# Patient Record
Sex: MALE | Race: WHITE | NOT HISPANIC OR LATINO | Employment: OTHER | URBAN - METROPOLITAN AREA
[De-identification: names, ages, dates, MRNs, and addresses within clinical notes are randomized per-mention and may not be internally consistent; named-entity substitution may affect disease eponyms.]

---

## 2017-04-17 ENCOUNTER — LAB (OUTPATIENT)
Dept: LAB | Facility: HOSPITAL | Age: 67
End: 2017-04-17
Attending: INTERNAL MEDICINE

## 2017-04-17 ENCOUNTER — TRANSCRIBE ORDERS (OUTPATIENT)
Dept: ADMINISTRATIVE | Facility: HOSPITAL | Age: 67
End: 2017-04-17

## 2017-04-17 DIAGNOSIS — R73.03 PREDIABETES: ICD-10-CM

## 2017-04-17 DIAGNOSIS — E29.1 HYPOGONADISM IN MALE: ICD-10-CM

## 2017-04-17 DIAGNOSIS — E55.9 VITAMIN D DEFICIENCY: ICD-10-CM

## 2017-04-17 DIAGNOSIS — E78.5 DYSLIPIDEMIA: ICD-10-CM

## 2017-04-17 DIAGNOSIS — R53.83 TIRED: ICD-10-CM

## 2017-04-17 DIAGNOSIS — R73.03 PREDIABETES: Primary | ICD-10-CM

## 2017-04-17 LAB
25(OH)D3 SERPL-MCNC: >60 NG/ML
ALBUMIN SERPL-MCNC: 4 G/DL (ref 3.5–5.2)
ALBUMIN/GLOB SERPL: 1.4 G/DL
ALP SERPL-CCNC: 44 U/L (ref 40–129)
ALT SERPL W P-5'-P-CCNC: 18 U/L (ref 5–41)
ANION GAP SERPL CALCULATED.3IONS-SCNC: 13.5 MMOL/L
AST SERPL-CCNC: 21 U/L (ref 5–40)
BASOPHILS # BLD AUTO: 0.03 10*3/MM3 (ref 0–0.2)
BASOPHILS NFR BLD AUTO: 0.4 % (ref 0–2)
BILIRUB SERPL-MCNC: 0.4 MG/DL (ref 0.2–1.2)
BUN BLD-MCNC: 15 MG/DL (ref 8–23)
BUN/CREAT SERPL: 12.1 (ref 7–25)
CALCIUM SPEC-SCNC: 8.7 MG/DL (ref 8.8–10.5)
CHLORIDE SERPL-SCNC: 103 MMOL/L (ref 98–107)
CHOLEST SERPL-MCNC: 143 MG/DL (ref 0–200)
CO2 SERPL-SCNC: 22.5 MMOL/L (ref 22–29)
CORTIS SERPL-MCNC: 15.06 MCG/DL
CREAT BLD-MCNC: 1.24 MG/DL (ref 0.76–1.27)
DEPRECATED RDW RBC AUTO: 42.5 FL (ref 37–54)
EOSINOPHIL # BLD AUTO: 0.14 10*3/MM3 (ref 0.1–0.3)
EOSINOPHIL NFR BLD AUTO: 2 % (ref 0–4)
ERYTHROCYTE [DISTWIDTH] IN BLOOD BY AUTOMATED COUNT: 12.7 % (ref 11.5–14.5)
FERRITIN SERPL-MCNC: 138 NG/ML (ref 30–400)
FOLATE SERPL-MCNC: 13.7 NG/ML (ref 4.78–24.2)
FSH SERPL-ACNC: 12.35 MIU/ML
GFR SERPL CREATININE-BSD FRML MDRD: 58 ML/MIN/1.73
GLOBULIN UR ELPH-MCNC: 2.8 GM/DL
GLUCOSE BLD-MCNC: 99 MG/DL (ref 65–99)
HBA1C MFR BLD: 5.9 % (ref 4.8–5.6)
HCT VFR BLD AUTO: 45.1 % (ref 42–52)
HDLC SERPL-MCNC: 59 MG/DL (ref 40–60)
HGB BLD-MCNC: 15.1 G/DL (ref 14–18)
IMM GRANULOCYTES # BLD: 0.02 10*3/MM3 (ref 0–0.03)
IMM GRANULOCYTES NFR BLD: 0.3 % (ref 0–0.5)
IRON 24H UR-MRATE: 94 MCG/DL (ref 59–158)
IRON SATN MFR SERPL: 40 %
LDLC SERPL CALC-MCNC: 73 MG/DL (ref 0–100)
LDLC/HDLC SERPL: 1.24 {RATIO}
LH SERPL-ACNC: 10.7 MIU/ML
LYMPHOCYTES # BLD AUTO: 1.71 10*3/MM3 (ref 0.6–4.8)
LYMPHOCYTES NFR BLD AUTO: 24.3 % (ref 20–45)
MCH RBC QN AUTO: 30.3 PG (ref 27–31)
MCHC RBC AUTO-ENTMCNC: 33.5 G/DL (ref 31–37)
MCV RBC AUTO: 90.6 FL (ref 80–94)
MONOCYTES # BLD AUTO: 0.64 10*3/MM3 (ref 0–1)
MONOCYTES NFR BLD AUTO: 9.1 % (ref 3–8)
NEUTROPHILS # BLD AUTO: 4.51 10*3/MM3 (ref 1.5–8.3)
NEUTROPHILS NFR BLD AUTO: 63.9 % (ref 45–70)
NRBC BLD MANUAL-RTO: 0 /100 WBC (ref 0–0)
PLATELET # BLD AUTO: 219 10*3/MM3 (ref 140–500)
PMV BLD AUTO: 9.7 FL (ref 7.4–10.4)
POTASSIUM BLD-SCNC: 4 MMOL/L (ref 3.5–5.2)
PROLACTIN SERPL-MCNC: 17.51 NG/ML (ref 4.04–15.2)
PROT SERPL-MCNC: 6.8 G/DL (ref 6–8.5)
RBC # BLD AUTO: 4.98 10*6/MM3 (ref 4.7–6.1)
RETICS/RBC NFR AUTO: 1.33 % (ref 0.5–1.5)
SODIUM BLD-SCNC: 139 MMOL/L (ref 136–145)
TIBC SERPL-MCNC: 237 MCG/DL (ref 261–498)
TRIGL SERPL-MCNC: 55 MG/DL (ref 0–150)
TSH SERPL DL<=0.05 MIU/L-ACNC: 0.99 MIU/ML (ref 0.27–4.2)
UIBC SERPL-MCNC: 143 MCG/DL (ref 112–346)
VIT B12 BLD-MCNC: 1459 PG/ML (ref 211–946)
VLDLC SERPL-MCNC: 11 MG/DL (ref 8–32)
WBC NRBC COR # BLD: 7.05 10*3/MM3 (ref 4.8–10.8)

## 2017-04-17 PROCEDURE — 84146 ASSAY OF PROLACTIN: CPT

## 2017-04-17 PROCEDURE — 84402 ASSAY OF FREE TESTOSTERONE: CPT

## 2017-04-17 PROCEDURE — 83540 ASSAY OF IRON: CPT

## 2017-04-17 PROCEDURE — 82607 VITAMIN B-12: CPT

## 2017-04-17 PROCEDURE — 80053 COMPREHEN METABOLIC PANEL: CPT

## 2017-04-17 PROCEDURE — 85025 COMPLETE CBC W/AUTO DIFF WBC: CPT

## 2017-04-17 PROCEDURE — 83001 ASSAY OF GONADOTROPIN (FSH): CPT

## 2017-04-17 PROCEDURE — 82746 ASSAY OF FOLIC ACID SERUM: CPT

## 2017-04-17 PROCEDURE — 84270 ASSAY OF SEX HORMONE GLOBUL: CPT

## 2017-04-17 PROCEDURE — 82670 ASSAY OF TOTAL ESTRADIOL: CPT

## 2017-04-17 PROCEDURE — 82728 ASSAY OF FERRITIN: CPT

## 2017-04-17 PROCEDURE — 85045 AUTOMATED RETICULOCYTE COUNT: CPT

## 2017-04-17 PROCEDURE — 83550 IRON BINDING TEST: CPT

## 2017-04-17 PROCEDURE — 82533 TOTAL CORTISOL: CPT

## 2017-04-17 PROCEDURE — 80061 LIPID PANEL: CPT

## 2017-04-17 PROCEDURE — 84403 ASSAY OF TOTAL TESTOSTERONE: CPT

## 2017-04-17 PROCEDURE — 82157 ASSAY OF ANDROSTENEDIONE: CPT

## 2017-04-17 PROCEDURE — 82306 VITAMIN D 25 HYDROXY: CPT

## 2017-04-17 PROCEDURE — 83002 ASSAY OF GONADOTROPIN (LH): CPT

## 2017-04-17 PROCEDURE — 84144 ASSAY OF PROGESTERONE: CPT

## 2017-04-17 PROCEDURE — 83525 ASSAY OF INSULIN: CPT

## 2017-04-17 PROCEDURE — 36415 COLL VENOUS BLD VENIPUNCTURE: CPT

## 2017-04-17 PROCEDURE — 82679 ASSAY OF ESTRONE: CPT

## 2017-04-17 PROCEDURE — 84443 ASSAY THYROID STIM HORMONE: CPT

## 2017-04-17 PROCEDURE — G0480 DRUG TEST DEF 1-7 CLASSES: HCPCS

## 2017-04-17 PROCEDURE — 82626 DEHYDROEPIANDROSTERONE: CPT

## 2017-04-17 PROCEDURE — 83036 HEMOGLOBIN GLYCOSYLATED A1C: CPT

## 2017-04-18 LAB
CONV COMMENT: NORMAL
ESTRADIOL SERPL HS-MCNC: 61.3 PG/ML (ref 7.6–42.6)
INSULIN SERPL-ACNC: 10.5 UIU/ML (ref 2.6–24.9)
PROGEST SERPL-MCNC: 0.1 NG/ML (ref 0–0.5)
SHBG SERPL-SCNC: 59.1 NMOL/L (ref 19.3–76.4)
TESTOST FREE SERPL-MCNC: 11.2 PG/ML (ref 6.6–18.1)
TESTOST SERPL-MCNC: 840 NG/DL (ref 348–1197)

## 2017-04-19 LAB
DHEA SERPL-MCNC: 304 NG/DL (ref 31–701)
ESTRONE SERPL-MCNC: 88 PG/ML (ref 12–72)

## 2017-04-20 LAB — ANDROST SERPL-MCNC: 113 NG/DL (ref 22–96)

## 2017-04-21 LAB — ANDROSTANOLONE SERPL-MCNC: 46 NG/DL

## 2017-09-13 ENCOUNTER — OFFICE VISIT (OUTPATIENT)
Dept: FAMILY MEDICINE CLINIC | Facility: CLINIC | Age: 67
End: 2017-09-13

## 2017-09-13 VITALS
OXYGEN SATURATION: 95 % | TEMPERATURE: 98 F | SYSTOLIC BLOOD PRESSURE: 120 MMHG | HEART RATE: 67 BPM | DIASTOLIC BLOOD PRESSURE: 72 MMHG | WEIGHT: 184.7 LBS | HEIGHT: 70 IN | BODY MASS INDEX: 26.44 KG/M2

## 2017-09-13 DIAGNOSIS — R79.89 LOW TESTOSTERONE LEVEL IN MALE: ICD-10-CM

## 2017-09-13 DIAGNOSIS — Z51.81 MEDICATION MONITORING ENCOUNTER: ICD-10-CM

## 2017-09-13 DIAGNOSIS — E55.9 VITAMIN D DEFICIENCY: ICD-10-CM

## 2017-09-13 DIAGNOSIS — E78.2 MIXED HYPERLIPIDEMIA: Primary | ICD-10-CM

## 2017-09-13 PROBLEM — Z00.00 MEDICARE ANNUAL WELLNESS VISIT, INITIAL: Status: ACTIVE | Noted: 2017-09-13

## 2017-09-13 PROBLEM — Z12.5 SCREENING FOR PROSTATE CANCER: Status: ACTIVE | Noted: 2017-09-13

## 2017-09-13 PROCEDURE — 99203 OFFICE O/P NEW LOW 30 MIN: CPT | Performed by: FAMILY MEDICINE

## 2017-09-13 RX ORDER — ERGOCALCIFEROL 1.25 MG/1
50000 CAPSULE ORAL
Qty: 12 CAPSULE | Refills: 3 | Status: SHIPPED | OUTPATIENT
Start: 2017-09-13 | End: 2019-04-17

## 2017-09-13 RX ORDER — TADALAFIL 20 MG/1
TABLET ORAL
COMMUNITY
Start: 2017-06-08 | End: 2018-08-23

## 2017-09-13 NOTE — PROGRESS NOTES
Subjective   Perez Bolaños is a 67 y.o. male who is here for   Chief Complaint   Patient presents with   • Hyperlipidemia   .     History of Present Illness   New patient here today  Along with with his wife.  Former Dr MELI Jain pt.  Has high chol  And low testosterone, this is treated with estrogen blocker Clomid.    The following portions of the patient's history were reviewed and updated as appropriate: allergies, current medications, past family history, past medical history, past social history, past surgical history and problem list.    Review of Systems    Objective   Physical Exam   Constitutional: He appears well-developed and well-nourished.   Cardiovascular: Normal rate.    Pulmonary/Chest: Effort normal.   Abdominal: Soft.   Neurological: He is alert.   Psychiatric: He has a normal mood and affect.   Nursing note and vitals reviewed.      Assessment/Plan   Perez was seen today for hyperlipidemia.    Diagnoses and all orders for this visit:    Mixed hyperlipidemia  -     Lipid Panel; Future    Low testosterone level in male  -     Testosterone, Free, Total; Future    Medication monitoring encounter  -     ALT; Future    Vitamin D deficiency  -     Vitamin D 25 Hydroxy; Future  -     vitamin D (ERGOCALCIFEROL) 06214 units capsule capsule; Take 1 capsule by mouth Every 7 (Seven) Days.      There are no Patient Instructions on file for this visit.    Medications Discontinued During This Encounter   Medication Reason   • vitamin B-12 (CYANOCOBALAMIN) 1000 MCG tablet Therapy completed   • vitamin D (ERGOCALCIFEROL) 86105 UNITS capsule capsule Reorder        Return in about 6 months (around 3/13/2018) for Medicare Wellness visit.    Dr. Nav Carmona  [place], [place]

## 2017-09-18 ENCOUNTER — RESULTS ENCOUNTER (OUTPATIENT)
Dept: FAMILY MEDICINE CLINIC | Facility: CLINIC | Age: 67
End: 2017-09-18

## 2017-09-18 DIAGNOSIS — R79.89 LOW TESTOSTERONE LEVEL IN MALE: ICD-10-CM

## 2017-09-18 DIAGNOSIS — E78.2 MIXED HYPERLIPIDEMIA: ICD-10-CM

## 2017-09-18 DIAGNOSIS — Z51.81 MEDICATION MONITORING ENCOUNTER: ICD-10-CM

## 2017-09-18 DIAGNOSIS — E55.9 VITAMIN D DEFICIENCY: ICD-10-CM

## 2017-09-26 ENCOUNTER — TELEPHONE (OUTPATIENT)
Dept: FAMILY MEDICINE CLINIC | Facility: CLINIC | Age: 67
End: 2017-09-26

## 2017-09-26 NOTE — TELEPHONE ENCOUNTER
Pts wife called and said that patient is on D3 and they received D2 in the mail that we sent in.  She wants to know if they are the same thing?

## 2017-09-29 ENCOUNTER — OFFICE VISIT (OUTPATIENT)
Dept: FAMILY MEDICINE CLINIC | Facility: CLINIC | Age: 67
End: 2017-09-29

## 2017-09-29 VITALS
TEMPERATURE: 98.2 F | DIASTOLIC BLOOD PRESSURE: 82 MMHG | OXYGEN SATURATION: 98 % | BODY MASS INDEX: 26.36 KG/M2 | HEIGHT: 70 IN | SYSTOLIC BLOOD PRESSURE: 134 MMHG | HEART RATE: 62 BPM | WEIGHT: 184.1 LBS

## 2017-09-29 DIAGNOSIS — K52.9 GASTROENTERITIS: Primary | ICD-10-CM

## 2017-09-29 PROCEDURE — 99213 OFFICE O/P EST LOW 20 MIN: CPT | Performed by: FAMILY MEDICINE

## 2017-09-29 RX ORDER — PRAVASTATIN SODIUM 40 MG
20 TABLET ORAL DAILY
COMMUNITY
Start: 2017-09-27 | End: 2018-03-26 | Stop reason: SDUPTHER

## 2017-09-29 NOTE — PROGRESS NOTES
Subjective   Perez Bolaños is a 67 y.o. male who is here for   Chief Complaint   Patient presents with   • Abdominal Cramping     x 4 days    • Nausea   • Constipation   • Headache   .     History of Present Illness   Abdominal Pain: Patient complains of abdominal pain. The pain is described as colicky, and is 3/10 in intensity. Pain is located in the suprapubic without radiation. Onset was a few days ago. Symptoms have been rapidly improving since. Aggravating factors: none.  Alleviating factors: none. Associated symptoms: constipation, headache and nausea. The patient denies diarrhea, dysuria, fever, hematochezia, hematuria, melena and vomiting.  Had posterior headache  cramps in [facility] abd  Flew to [place], bad night in hot          The following portions of the patient's history were reviewed and updated as appropriate: allergies, current medications, past family history, past medical history, past social history, past surgical history and problem list.    Review of Systems    Objective   Physical Exam   Constitutional: He appears well-developed and well-nourished.   Cardiovascular: Normal rate.    Pulmonary/Chest: Effort normal.   Abdominal: Soft. Normal appearance and bowel sounds are normal. There is no hepatosplenomegaly. There is tenderness. There is no rigidity, no rebound, no guarding, no CVA tenderness, no tenderness at McBurney's point and negative Macdonald's sign.       Nursing note and vitals reviewed.      Assessment/Plan   Perez was seen today for abdominal cramping, nausea, constipation and headache.    Diagnoses and all orders for this visit:    Gastroenteritis      Patient Instructions   Resolving stomach bug  Vs a flair of diverticulitis, had + diverticulosis seen during his colonoscopy by dr Palomo.      Medications Discontinued During This Encounter   Medication Reason   • pravastatin (PRAVACHOL) 20 MG tablet Dose adjustment        No Follow-up on file.    Dr. Nav Carmnoa  [facility]  Practice  [place], [place]

## 2017-09-29 NOTE — PATIENT INSTRUCTIONS
Resolving stomach bug  Vs a flair of diverticulitis, had + diverticulosis seen during his colonoscopy by dr Palomo.

## 2017-10-01 LAB
25(OH)D3+25(OH)D2 SERPL-MCNC: >60 NG/ML
ALT SERPL-CCNC: 37 U/L (ref 5–41)
CHOLEST SERPL-MCNC: 149 MG/DL (ref 0–200)
HDLC SERPL-MCNC: 56 MG/DL (ref 40–60)
LDLC SERPL CALC-MCNC: 76 MG/DL (ref 0–100)
TESTOST FREE SERPL-MCNC: 13.9 PG/ML (ref 6.6–18.1)
TESTOST SERPL-MCNC: 699 NG/DL (ref 264–916)
TRIGL SERPL-MCNC: 86 MG/DL (ref 0–150)
VLDLC SERPL CALC-MCNC: 17.2 MG/DL (ref 8–32)

## 2018-01-31 ENCOUNTER — OFFICE VISIT (OUTPATIENT)
Dept: FAMILY MEDICINE CLINIC | Facility: CLINIC | Age: 68
End: 2018-01-31

## 2018-01-31 VITALS
HEART RATE: 68 BPM | BODY MASS INDEX: 27.16 KG/M2 | DIASTOLIC BLOOD PRESSURE: 88 MMHG | TEMPERATURE: 98.3 F | OXYGEN SATURATION: 96 % | WEIGHT: 189.7 LBS | SYSTOLIC BLOOD PRESSURE: 150 MMHG | HEIGHT: 70 IN

## 2018-01-31 DIAGNOSIS — J06.9 ACUTE URI: Primary | ICD-10-CM

## 2018-01-31 PROCEDURE — 99213 OFFICE O/P EST LOW 20 MIN: CPT | Performed by: FAMILY MEDICINE

## 2018-01-31 RX ORDER — AZITHROMYCIN 250 MG/1
TABLET, FILM COATED ORAL
Qty: 6 TABLET | Refills: 0 | Status: SHIPPED | OUTPATIENT
Start: 2018-01-31 | End: 2018-03-26

## 2018-01-31 NOTE — PROGRESS NOTES
"  Chief Complaint   Patient presents with   • Nasal Congestion     x 9 days    • Excessive Sweating   • Nausea   • Fatigue   • Headache       Upper Respiratory Infection: Patient complains of symptoms of a URI. Symptoms include congestion, cough and sore throat. Onset of symptoms was 10 days ago, rapidly improving since that time. He also c/o achiness for the past 1 week .  He is drinking plenty of fluids. Evaluation to date: none. Treatment to date: none.  Ill contacts at home  Discussed. Was on a plane from [place]  And went to a [employer] in [place]  Began ill 10 d ago  Was progressing well.  But just this am rolled out of bed had a sudden nausea vagal episode.    He has vertigo episodes prn, such as getting down on garage floor repairing under his car.        Vitals:    01/31/18 1402   BP: 150/88   BP Location: Left arm   Patient Position: Sitting   Pulse: 68   Temp: 98.3 °F (36.8 °C)   SpO2: 96%   Weight: 86 kg (189 lb 11.2 oz)   Height: 176.5 cm (69.5\")     Gen: well appearing, alert  Ears: Tm's bulging without redness  Nose:  Congestion  Throat:  Red without exudate, some drainage, tonsils okay  Neck: no LAD  Lung: , good air movement, regular RR  Heart: RR without murmur  Skin: no rash.      Assessment/Plan   Perez was seen today for nasal congestion, excessive sweating, nausea, fatigue and headache.    Diagnoses and all orders for this visit:    Acute URI  -     azithromycin (ZITHROMAX) 250 MG tablet; Take 2 tablets the first day, then 1 tablet daily for 4 days.             Patient Instructions   Resolving uri  Perhaps a sudden positional vertigo episode this am.  Hold Zpak for now.        Tylenol or Advil as needed for pain, fever, muscle aches  Plenty of fluids  Hand washing discussed  Off work or school note given if needed.  Warm tea for throat.  Pros and cons of antibiotic use discussed    Dr. Nav Carmona MD  Family [place], [place]  [facility]  "

## 2018-03-16 DIAGNOSIS — E78.2 MIXED HYPERLIPIDEMIA: Primary | ICD-10-CM

## 2018-03-16 DIAGNOSIS — Z12.5 SCREENING FOR PROSTATE CANCER: ICD-10-CM

## 2018-03-16 DIAGNOSIS — Z51.81 MEDICATION MONITORING ENCOUNTER: ICD-10-CM

## 2018-03-16 DIAGNOSIS — R53.83 FATIGUE, UNSPECIFIED TYPE: ICD-10-CM

## 2018-03-19 LAB
ALBUMIN SERPL-MCNC: 3.9 G/DL (ref 3.5–5.2)
ALBUMIN/GLOB SERPL: 1.7 G/DL
ALP SERPL-CCNC: 48 U/L (ref 40–129)
ALT SERPL-CCNC: 18 U/L (ref 5–41)
AST SERPL-CCNC: 18 U/L (ref 5–40)
BILIRUB SERPL-MCNC: 0.4 MG/DL (ref 0.2–1.2)
BUN SERPL-MCNC: 18 MG/DL (ref 8–23)
BUN/CREAT SERPL: 15.3 (ref 7–25)
CALCIUM SERPL-MCNC: 9.1 MG/DL (ref 8.8–10.5)
CHLORIDE SERPL-SCNC: 105 MMOL/L (ref 98–107)
CHOLEST SERPL-MCNC: 125 MG/DL (ref 0–200)
CO2 SERPL-SCNC: 27.6 MMOL/L (ref 22–29)
CREAT SERPL-MCNC: 1.18 MG/DL (ref 0.76–1.27)
ERYTHROCYTE [DISTWIDTH] IN BLOOD BY AUTOMATED COUNT: 13.3 % (ref 11.5–14.5)
GFR SERPLBLD CREATININE-BSD FMLA CKD-EPI: 61 ML/MIN/1.73
GFR SERPLBLD CREATININE-BSD FMLA CKD-EPI: 74 ML/MIN/1.73
GLOBULIN SER CALC-MCNC: 2.3 GM/DL
GLUCOSE SERPL-MCNC: 101 MG/DL (ref 65–99)
HCT VFR BLD AUTO: 45.4 % (ref 42–52)
HDLC SERPL-MCNC: 48 MG/DL (ref 40–60)
HGB BLD-MCNC: 14.9 G/DL (ref 14–18)
LDLC SERPL CALC-MCNC: 63 MG/DL (ref 0–100)
LDLC/HDLC SERPL: 1.3 {RATIO}
MCH RBC QN AUTO: 31 PG (ref 27–31)
MCHC RBC AUTO-ENTMCNC: 32.8 G/DL (ref 31–37)
MCV RBC AUTO: 94.6 FL (ref 80–94)
PLATELET # BLD AUTO: 208 10*3/MM3 (ref 140–500)
POTASSIUM SERPL-SCNC: 4.6 MMOL/L (ref 3.5–5.2)
PROT SERPL-MCNC: 6.2 G/DL (ref 6–8.5)
PSA SERPL-MCNC: 0.43 NG/ML (ref 0–4)
RBC # BLD AUTO: 4.8 10*6/MM3 (ref 4.7–6.1)
SODIUM SERPL-SCNC: 142 MMOL/L (ref 136–145)
TRIGL SERPL-MCNC: 72 MG/DL (ref 0–150)
TSH SERPL DL<=0.005 MIU/L-ACNC: 1.23 MIU/ML (ref 0.27–4.2)
VLDLC SERPL CALC-MCNC: 14.4 MG/DL (ref 8–32)
WBC # BLD AUTO: 7.17 10*3/MM3 (ref 4.8–10.8)

## 2018-03-26 ENCOUNTER — OFFICE VISIT (OUTPATIENT)
Dept: FAMILY MEDICINE CLINIC | Facility: CLINIC | Age: 68
End: 2018-03-26

## 2018-03-26 VITALS
OXYGEN SATURATION: 95 % | HEIGHT: 70 IN | TEMPERATURE: 98.2 F | BODY MASS INDEX: 27.06 KG/M2 | SYSTOLIC BLOOD PRESSURE: 140 MMHG | WEIGHT: 189 LBS | DIASTOLIC BLOOD PRESSURE: 86 MMHG | HEART RATE: 72 BPM

## 2018-03-26 DIAGNOSIS — R79.89 LOW TESTOSTERONE LEVEL IN MALE: ICD-10-CM

## 2018-03-26 DIAGNOSIS — E78.2 MIXED HYPERLIPIDEMIA: ICD-10-CM

## 2018-03-26 DIAGNOSIS — Z51.81 MEDICATION MONITORING ENCOUNTER: ICD-10-CM

## 2018-03-26 DIAGNOSIS — Z00.00 MEDICARE ANNUAL WELLNESS VISIT, SUBSEQUENT: ICD-10-CM

## 2018-03-26 DIAGNOSIS — B35.9 RINGWORM: ICD-10-CM

## 2018-03-26 DIAGNOSIS — Z23 NEED FOR VACCINATION FOR PNEUMOCOCCUS: Primary | ICD-10-CM

## 2018-03-26 DIAGNOSIS — E55.9 VITAMIN D DEFICIENCY: ICD-10-CM

## 2018-03-26 PROCEDURE — G0439 PPPS, SUBSEQ VISIT: HCPCS | Performed by: FAMILY MEDICINE

## 2018-03-26 PROCEDURE — 90670 PCV13 VACCINE IM: CPT | Performed by: FAMILY MEDICINE

## 2018-03-26 PROCEDURE — G0009 ADMIN PNEUMOCOCCAL VACCINE: HCPCS | Performed by: FAMILY MEDICINE

## 2018-03-26 RX ORDER — PRAVASTATIN SODIUM 20 MG
20 TABLET ORAL DAILY
Qty: 90 TABLET | Refills: 3 | Status: SHIPPED | OUTPATIENT
Start: 2018-03-26 | End: 2019-04-17 | Stop reason: SDUPTHER

## 2018-03-26 RX ORDER — KETOCONAZOLE 20 MG/G
CREAM TOPICAL EVERY 12 HOURS SCHEDULED
Qty: 30 G | Refills: 1 | Status: SHIPPED | OUTPATIENT
Start: 2018-03-26 | End: 2018-08-23

## 2018-03-26 NOTE — PROGRESS NOTES
QUICK REFERENCE INFORMATION:  The ABCs of the Annual Wellness Visit    Subsequent Medicare Wellness Visit    HEALTH RISK ASSESSMENT    1950    Recent Hospitalizations:  No hospitalization(s) within the last year..    Current Medical Providers:  Patient Care Team:  Nav Carmona MD as PCP - General (Family Medicine)  Harvinder Palomo MD as Consulting Physician (Gastroenterology)    Smoking Status:  History   Smoking Status   • Never Smoker   Smokeless Tobacco   • Never Used       Alcohol Consumption:  History   Alcohol Use   • 1.2 - 1.8 oz/week   • 2 - 3 Cans of beer per week     Comment: weekend       Depression Screen:   PHQ-2/PHQ-9 Depression Screening 3/26/2018   Little interest or pleasure in doing things 0   Feeling down, depressed, or hopeless 0   Total Score 0       Health Habits and Functional and Cognitive Screening:  Functional & Cognitive Status 3/26/2018   Do you have difficulty preparing food and eating? No   Do you have difficulty bathing yourself, getting dressed or grooming yourself? No   Do you have difficulty using the toilet? No   Do you have difficulty moving around from place to place? No   Do you have trouble with steps or getting out of a bed or a chair? No   In the past year have you fallen or experienced a near fall? No   Current Diet Well Balanced Diet   Dental Exam Not up to date   Eye Exam Not up to date   Exercise (times per week) 7 times per week   Current Exercise Activities Include Walking   Do you need help using the phone?  No   Are you deaf or do you have serious difficulty hearing?  No   Do you need help with transportation? No   Do you need help shopping? No   Do you need help preparing meals?  No   Do you need help with housework?  No   Do you need help with laundry? No   Do you need help taking your medications? No   Do you need help managing money? No   Do you ever drive or ride in a car without wearing a seat belt? No   Have you felt unusual stress, anger  or loneliness in the last month? No   Who do you live with? Spouse   If you need help, do you have trouble finding someone available to you? No   Have you been bothered in the last four weeks by sexual problems? No   Do you have difficulty concentrating, remembering or making decisions? No   Does the patient have evidence of cognitive impairment? No    Aspirin use counseling: Taking ASA appropriately as indicated      Recent Lab Results:  CMP:  Lab Results   Component Value Date     (H) 03/19/2018    BUN 18 03/19/2018    CREATININE 1.18 03/19/2018    EGFRIFNONA 61 03/19/2018    EGFRIFAFRI 74 03/19/2018    BCR 15.3 03/19/2018     03/19/2018    K 4.6 03/19/2018    CO2 27.6 03/19/2018    CALCIUM 9.1 03/19/2018    PROTENTOTREF 6.2 03/19/2018    ALBUMIN 3.90 03/19/2018    LABGLOBREF 2.3 03/19/2018    LABIL2 1.7 03/19/2018    BILITOT 0.4 03/19/2018    ALKPHOS 48 03/19/2018    AST 18 03/19/2018    ALT 18 03/19/2018     Lipid Panel:  Lab Results   Component Value Date    CHOL 143 04/17/2017    TRIG 72 03/19/2018    HDL 48 03/19/2018    VLDL 14.4 03/19/2018    LDLDIRECT 113 (H) 04/17/2017    LDLHDL 1.30 03/19/2018     HbA1c:  Lab Results   Component Value Date    HGBA1C 5.90 (H) 04/17/2017       Visual Acuity:  No exam data present    Age-appropriate Screening Schedule:  Refer to the list below for future screening recommendations based on patient's age, sex and/or medical conditions. Orders for these recommended tests are listed in the plan section. The patient has been provided with a written plan.    Health Maintenance   Topic Date Due   • TDAP/TD VACCINES (1 - Tdap) 04/13/1967   • PNEUMOCOCCAL VACCINES (65+ LOW/MEDIUM RISK) (1 of 2 - PCV13) 04/13/2013   • INFLUENZA VACCINE  08/01/2017   • ZOSTER VACCINE  09/13/2017   • LIPID PANEL  03/19/2019   • COLONOSCOPY  11/04/2026        Subjective   History of Present Illness    Perez Bolaños is a 67 y.o. male who presents for an Subsequent Wellness Visit.    The  following portions of the patient's history were reviewed and updated as appropriate: allergies, current medications, past family history, past medical history, past social history, past surgical history and problem list.    Outpatient Medications Prior to Visit   Medication Sig Dispense Refill   • aspirin 325 MG tablet Take 325 mg by mouth Daily.     • chlorpheniramine (CHLOR-TRIMETON) 4 MG tablet Take 4 mg by mouth Every 6 (Six) Hours As Needed for allergies.     • clomiPHENE (CLOMID) 50 MG tablet Take 50 mg by mouth Every Other Day.     • magnesium oxide (MAG-OX) 400 MG tablet Take 400 mg by mouth Daily.     • Probiotic Product (PROBIOTIC ADVANCED PO) Take  by mouth Daily.     • tadalafil (CIALIS) 20 MG tablet      • vitamin D (ERGOCALCIFEROL) 20258 units capsule capsule Take 1 capsule by mouth Every 7 (Seven) Days. 12 capsule 3   • pravastatin (PRAVACHOL) 40 MG tablet Take 20 mg by mouth Daily.     • azithromycin (ZITHROMAX) 250 MG tablet Take 2 tablets the first day, then 1 tablet daily for 4 days. 6 tablet 0     No facility-administered medications prior to visit.        Patient Active Problem List   Diagnosis   • Sleep apnea   • MI (mitral incompetence)   • Seasonal allergies   • Mixed hyperlipidemia   • History of transfusion   • GERD (gastroesophageal reflux disease)   • Gastric ulcer   • Constipation   • Arthritis   • Low testosterone level in male   • Screening for prostate cancer   • Medication monitoring encounter   • Vitamin D deficiency   • Medicare annual wellness visit, subsequent   • Gastroenteritis   • Routine adult health maintenance   • Ringworm       Advance Care Planning:  has an advance directive - a copy HAS NOT been provided    Identification of Risk Factors:  Risk factors include: cardiovascular risk and chronic pain.    Review of Systems   Musculoskeletal: Positive for arthralgias.       Compared to one year ago, the patient feels his physical health is the same.  Compared to one year  "ago, the patient feels his mental health is the same.    Objective     Physical Exam   Constitutional: He appears well-developed and well-nourished.   Cardiovascular: Normal rate.    Pulmonary/Chest: Effort normal.   Neurological: He is alert.   Nursing note and vitals reviewed.      Vitals:    03/26/18 1238   BP: 140/86   BP Location: Left arm   Patient Position: Sitting   Pulse: 72   Temp: 98.2 °F (36.8 °C)   SpO2: 95%   Weight: 85.7 kg (189 lb)   Height: 176.5 cm (69.5\")   PainSc: 0-No pain       Body mass index is 27.51 kg/m².  Discussed the patient's BMI with him. BMI is above normal parameters. Follow-up plan includes:  exercise counseling and nutrition counseling.    Assessment/Plan   Patient Self-Management and Personalized Health Advice  The patient has been provided with information about: diet, exercise, weight management, prevention of cardiac or vascular disease and the relationship between weight and GERD and preventive services including:   · Diabetes screening, see lab orders, Pneumococcal vaccine , Prostate cancer screening discussed, Zostavax vaccine (Herpes Zoster).    Visit Diagnoses:    ICD-10-CM ICD-9-CM   1. Need for vaccination for pneumococcus Z23 V03.82   2. Medicare annual wellness visit, subsequent Z00.00 V70.0   3. Medication monitoring encounter Z51.81 V58.83   4. Ringworm B35.9 110.9   5. Low testosterone level in male E29.1 257.2   6. Mixed hyperlipidemia E78.2 272.2   7. Vitamin D deficiency E55.9 268.9       Orders Placed This Encounter   Procedures   • Pneumococcal Conjugate Vaccine 13-Valent All   • Lipid Panel     Standing Status:   Future     Standing Expiration Date:   3/27/2019   • ALT     Standing Status:   Future     Standing Expiration Date:   3/27/2019   • Vitamin D 25 Hydroxy     Standing Status:   Future     Standing Expiration Date:   3/27/2019   • Testosterone, Free, Total     Standing Status:   Future     Standing Expiration Date:   3/27/2019       Outpatient " Encounter Prescriptions as of 3/26/2018   Medication Sig Dispense Refill   • aspirin 325 MG tablet Take 325 mg by mouth Daily.     • Calcium 250 MG capsule Take  by mouth.     • chlorpheniramine (CHLOR-TRIMETON) 4 MG tablet Take 4 mg by mouth Every 6 (Six) Hours As Needed for allergies.     • clomiPHENE (CLOMID) 50 MG tablet Take 50 mg by mouth Every Other Day.     • magnesium oxide (MAG-OX) 400 MG tablet Take 400 mg by mouth Daily.     • pravastatin (PRAVACHOL) 20 MG tablet Take 1 tablet by mouth Daily. 90 tablet 3   • Probiotic Product (PROBIOTIC ADVANCED PO) Take  by mouth Daily.     • tadalafil (CIALIS) 20 MG tablet      • vitamin D (ERGOCALCIFEROL) 01288 units capsule capsule Take 1 capsule by mouth Every 7 (Seven) Days. 12 capsule 3   • [DISCONTINUED] pravastatin (PRAVACHOL) 40 MG tablet Take 20 mg by mouth Daily.     • ketoconazole (NIZORAL) 2 % cream Apply  topically Every 12 (Twelve) Hours. 30 g 1   • Zoster Vac Recomb Adjuvanted (SHINGRIX) 50 MCG reconstituted suspension Inject 50 mcg into the shoulder, thigh, or buttocks Every 6 (Six) Months for 2 doses. 1 each 1   • [DISCONTINUED] azithromycin (ZITHROMAX) 250 MG tablet Take 2 tablets the first day, then 1 tablet daily for 4 days. 6 tablet 0     No facility-administered encounter medications on file as of 3/26/2018.        Reviewed use of high risk medication in the elderly: not applicable  Reviewed for potential of harmful drug interactions in the elderly: not applicable   Labs look good  Refilled meds  Can dec vit D dose to OTC 1000 IU a day    Follow Up:  Return in about 6 months (around 9/26/2018) for blood pressure.     An After Visit Summary and PPPS with all of these plans were given to the patient.

## 2018-08-19 ENCOUNTER — APPOINTMENT (OUTPATIENT)
Dept: GENERAL RADIOLOGY | Facility: HOSPITAL | Age: 68
End: 2018-08-19

## 2018-08-19 ENCOUNTER — HOSPITAL ENCOUNTER (EMERGENCY)
Facility: HOSPITAL | Age: 68
Discharge: HOME | End: 2018-08-19
Attending: EMERGENCY MEDICINE | Admitting: EMERGENCY MEDICINE

## 2018-08-19 VITALS
BODY MASS INDEX: 25.48 KG/M2 | HEART RATE: 74 BPM | SYSTOLIC BLOOD PRESSURE: 114 MMHG | OXYGEN SATURATION: 99 % | HEIGHT: 70 IN | WEIGHT: 178 LBS | DIASTOLIC BLOOD PRESSURE: 74 MMHG | RESPIRATION RATE: 16 BRPM | TEMPERATURE: 97.9 F

## 2018-08-19 DIAGNOSIS — S42.032A DISPLACED FRACTURE OF LATERAL END OF LEFT CLAVICLE, INITIAL ENCOUNTER FOR CLOSED FRACTURE: Primary | ICD-10-CM

## 2018-08-19 DIAGNOSIS — S81.812A LACERATION OF LEFT LOWER LEG, INITIAL ENCOUNTER: ICD-10-CM

## 2018-08-19 PROCEDURE — 90714 TD VACC NO PRESV 7 YRS+ IM: CPT

## 2018-08-19 PROCEDURE — 99283 EMERGENCY DEPT VISIT LOW MDM: CPT

## 2018-08-19 PROCEDURE — 90471 IMMUNIZATION ADMIN: CPT

## 2018-08-19 PROCEDURE — 99284 EMERGENCY DEPT VISIT MOD MDM: CPT | Performed by: EMERGENCY MEDICINE

## 2018-08-19 PROCEDURE — 73030 X-RAY EXAM OF SHOULDER: CPT

## 2018-08-19 PROCEDURE — 73000 X-RAY EXAM OF COLLAR BONE: CPT

## 2018-08-19 PROCEDURE — 25010000002 TETANUS-DIPHTHERIA TOXOIDS (ADULT) PER 0.5 ML

## 2018-08-19 PROCEDURE — 12032 INTMD RPR S/A/T/EXT 2.6-7.5: CPT | Performed by: EMERGENCY MEDICINE

## 2018-08-19 RX ORDER — DOCUSATE SODIUM 100 MG/1
100 CAPSULE, LIQUID FILLED ORAL 2 TIMES DAILY PRN
Qty: 30 CAPSULE | Refills: 0 | Status: SHIPPED | OUTPATIENT
Start: 2018-08-19 | End: 2018-08-23

## 2018-08-19 RX ORDER — LIDOCAINE HYDROCHLORIDE AND EPINEPHRINE 20; 5 MG/ML; UG/ML
20 INJECTION, SOLUTION EPIDURAL; INFILTRATION; INTRACAUDAL; PERINEURAL ONCE
Status: COMPLETED | OUTPATIENT
Start: 2018-08-19 | End: 2018-08-19

## 2018-08-19 RX ORDER — HYDROCODONE BITARTRATE AND ACETAMINOPHEN 5; 325 MG/1; MG/1
TABLET ORAL
Qty: 20 TABLET | Refills: 0 | Status: SHIPPED | OUTPATIENT
Start: 2018-08-19 | End: 2018-08-23

## 2018-08-19 RX ORDER — HYDROCODONE BITARTRATE AND ACETAMINOPHEN 5; 325 MG/1; MG/1
1 TABLET ORAL ONCE
Status: COMPLETED | OUTPATIENT
Start: 2018-08-19 | End: 2018-08-19

## 2018-08-19 RX ADMIN — HYDROCODONE BITARTRATE AND ACETAMINOPHEN 1 TABLET: 5; 325 TABLET ORAL at 20:23

## 2018-08-19 RX ADMIN — LIDOCAINE HYDROCHLORIDE,EPINEPHRINE BITARTRATE 20 ML: 20; .005 INJECTION, SOLUTION EPIDURAL; INFILTRATION; INTRACAUDAL; PERINEURAL at 21:19

## 2018-08-19 RX ADMIN — CLOSTRIDIUM TETANI TOXOID ANTIGEN (FORMALDEHYDE INACTIVATED) AND CORYNEBACTERIUM DIPHTHERIAE TOXOID ANTIGEN (FORMALDEHYDE INACTIVATED) 0.5 ML: 5; 2 INJECTION, SUSPENSION INTRAMUSCULAR at 19:49
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
## 2018-08-20 NOTE — ED NOTES
Dr. Alvarado was called and spoke to Dr. Jean regarding the patient.      Evelyne Jean  08/19/18 0379

## 2018-08-20 NOTE — ED NOTES
Laceration on left ankle was cleaned with hippacleanse and flushed with saline. Gauze was applied over the laceration to keep it covered.     Evelyne Jean  08/19/18 7087

## 2018-08-20 NOTE — ED NOTES
Bacitracin was applied to laceration on left ankle. Non stick gauze, gauze, and coban was applied to the laceration.      Evelyne Jean  08/19/18 3293

## 2018-08-21 ENCOUNTER — OFFICE VISIT (OUTPATIENT)
Dept: FAMILY MEDICINE CLINIC | Facility: CLINIC | Age: 68
End: 2018-08-21

## 2018-08-21 VITALS
DIASTOLIC BLOOD PRESSURE: 80 MMHG | OXYGEN SATURATION: 96 % | SYSTOLIC BLOOD PRESSURE: 140 MMHG | HEART RATE: 72 BPM | HEIGHT: 70 IN | WEIGHT: 186 LBS | BODY MASS INDEX: 26.63 KG/M2

## 2018-08-21 DIAGNOSIS — Z01.818 PRE-OPERATIVE CLEARANCE: ICD-10-CM

## 2018-08-21 DIAGNOSIS — S42.022D CLOSED DISPLACED FRACTURE OF SHAFT OF LEFT CLAVICLE WITH ROUTINE HEALING: Primary | ICD-10-CM

## 2018-08-21 PROCEDURE — 93000 ELECTROCARDIOGRAM COMPLETE: CPT | Performed by: FAMILY MEDICINE

## 2018-08-21 PROCEDURE — 99214 OFFICE O/P EST MOD 30 MIN: CPT | Performed by: FAMILY MEDICINE

## 2018-08-21 NOTE — PROGRESS NOTES
Subjective:      Perez Bolaños is a 68 y.o. male who presents to the office today for a preoperative consultation at the request of surgeon Dr. Clemente Jean who plans on performing  on August 28.   This consultation is requested for the specific conditions prompting preoperative evaluation (i.e. because of potential affect on operative risk): none/age.   Planned anesthesia is general.   The patient has the following known anesthesia issues: none. Patient has a bleeding risk of: no recent abnormal bleeding.   Patient does not have objections to receiving blood products if needed.    The following portions of the patient's history were reviewed and updated as appropriate: allergies, current medications, past family history, past medical history, past social history, past surgical history and problem list.        Patient Active Problem List    Diagnosis   • Routine adult health maintenance [Z00.00]   • Ringworm [B35.9]   • Gastroenteritis [K52.9]   • Low testosterone level in male [E29.1]   • Screening for prostate cancer [Z12.5]   • Medication monitoring encounter [Z51.81]   • Vitamin D deficiency [E55.9]   • Medicare annual wellness visit, subsequent [Z00.00]   • Sleep apnea [G47.30]   • MI (mitral incompetence) [I34.0]   • Seasonal allergies [J30.2]   • Mixed hyperlipidemia [E78.2]   • History of transfusion [Z92.89]   • GERD (gastroesophageal reflux disease) [K21.9]   • Gastric ulcer [K25.9]   • Constipation [K59.00]   • Arthritis [M19.90]       Past Medical History:   Diagnosis Date   • Allergic 1991    Environmental (pollen, etc.)   • Arthritis    • Colon polyp Fall 2016    Polyps removed during colonoscopy   • Constipation    • Diverticulosis Fall 2016    Just found out today about Diverticulitis   • Gastric ulcer    • GERD (gastroesophageal reflux disease)    • Headache    • History of transfusion    • HL (hearing loss)    • Hyperlipidemia    • Low back pain    • MI (mitral incompetence)    • Myocardial  "infarction 2007   • Peptic ulceration 2010    Bleeding ulcers were cauterized 2 X within 2 weeks   • Seasonal allergies    • Sleep apnea    • Visual impairment 2012 & 2017    R eye nearsighted & L eye farsighted & floaters in both       Social History     Social History   • Marital status:      Spouse name: N/A   • Number of children: N/A   • Years of education: N/A     Occupational History   • Not on file.     Social History Main Topics   • Smoking status: Never Smoker   • Smokeless tobacco: Never Used   • Alcohol use 1.2 - 1.8 oz/week     2 - 3 Cans of beer0 - 1 Standard drinks or equivalent per week      Comment: weekend   • Drug use: No   • Sexual activity: Yes     Partners: Female     Birth control/ protection: Surgical      Comment: Vasectomy in 2000     Other Topics Concern   • Not on file     Social History Narrative   • No narrative on file       Family History   Problem Relation Age of Onset   • Heart disease Mother    • Alcohol abuse Mother    • Arthritis Sister         Double knee replacement   • Hypertension Sister    • Heart attack Sister    • Heart disease Sister    • Heart attack Brother    • Heart disease Brother        Past Surgical History:   Procedure Laterality Date   • ABDOMINAL SURGERY      ulcers   • BACK SURGERY     • CARDIAC CATHETERIZATION     • COLON SURGERY  2016    Colonoscopy w/ polyps removal   • COLONOSCOPY     • COLONOSCOPY N/A 11/4/2016    Procedure: COLONOSCOPY w/ polypectomy;  Surgeon: Harvinder Palomo MD;  Location: [facility] OR;  Service:    • HERNIA REPAIR  2010   • INGUINAL HERNIA REPAIR  2010   • SPINE SURGERY  1993    Lower back   • TONSILLECTOMY     • UMBILICAL HERNIA REPAIR     • VASECTOMY  2000       No Known Allergies    Review of Systems  A comprehensive review of systems was negative.     Vitals:    08/21/18 0922   BP: 140/80   BP Location: Left arm   Patient Position: Sitting   Pulse: 72   SpO2: 96%   Weight: 84.4 kg (186 lb)   Height: 177.8 cm (70\") "         Objective:     General Appearance:  Alert, cooperative, no distress, appears stated age   Head:  Normocephalic, without obvious abnormality, atraumatic   Eyes:  PERRL, conjunctiva/corneas clear, EOM's intact.    Ears:  Normal TM's and external ear canals, both ears   Nose: Nares normal, septum midline, mucosa normal, no drainage or sinus tenderness   Throat: Lips, mucosa, and tongue normal; teeth and gums normal   Neck: Supple, symmetrical, trachea midline, no adenopathy;   thyroid: No enlargement/tenderness/nodules; no carotid  bruit or JVD   Back:  Symmetric, no curvature, ROM normal, no CVA tenderness   Lungs:  Clear to auscultation bilaterally, respirations unlabored   Chest wall:  No tenderness or deformity   Heart:  Regular rate and rhythm, S1 and S2 normal, no murmur, rub or gallop   Abdomen:  Soft, non-tender, bowel sounds active all four quadrants,   no masses, no organomegaly. Umbilical hernia             Extremities: Left arm in a sling due to a fractured left clavicle.   Pulses: 2+ and symmetric all extremities   Skin: Skin color, texture, turgor normal, no rashes or lesions   Lymph nodes: Cervical, supraclavicular, and axillary nodes normal   Neurologic: CNII-XII intact. Normal strength, sensation and reflexes   throughout     Predictors of intubation difficulty:  Morbid obesity? no  Short, thick neck? no  Dentition: No chipped, loose, or missing teeth.    Imaging  Chest x-ray: pending     Lab Review   Pending.        ECG 12 Lead  Date/Time: 8/21/2018 10:01 AM  Performed by: TAWNYA MARLEY  Authorized by: TAWNYA MARLEY   Comparison: not compared with previous ECG   Rhythm: sinus rhythm  Rate: normal  Conduction: conduction normal  ST Segments: ST segments normal  T Waves: T waves normal  QRS axis: left  Other: no other findings  Clinical impression: non-specific ECG  Comments: Pre op EKG , ok  Mild LAD.                  Assessment:      68 y.o. male with planned surgery as above.     Known risk factors for perioperative complications: None    Difficulty with intubation is not anticipated.    Cardiac Risk Estimation: per the Revised Cardiac Risk Index (Circ. 100:1043, 1999), the patient's risk factors for cardiac complications include none, putting him in: RCI RISK CLASS I (0 risk factors, risk of major cardiac compl. appr. 0.5%)    Current medications which may produce withdrawal symptoms if withheld perioperatively: none.       Plan:      1. Preoperative workup as follows ECG, labs pending.  2. Change in medication regimen before surgery: discontinue ASA 14 days before surgery.  3. Prophylaxis for cardiac events with perioperative beta-blockers: not indicated.  4. Invasive hemodynamic monitoring perioperatively: not indicated.  5. Deep vein thrombosis prophylaxis postoperatively:regimen to be chosen by surgical team.  6.  From my standpoint, patient is cleared for surgery with general anesthesia, with low risk if all his labs on Thursday are normal.    Dr. Nav Carmona MD  [place], [place]  [facility]

## 2018-08-23 ENCOUNTER — APPOINTMENT (OUTPATIENT)
Dept: PREADMISSION TESTING | Facility: HOSPITAL | Age: 68
End: 2018-08-23

## 2018-08-23 VITALS
OXYGEN SATURATION: 96 % | DIASTOLIC BLOOD PRESSURE: 76 MMHG | WEIGHT: 187 LBS | SYSTOLIC BLOOD PRESSURE: 119 MMHG | TEMPERATURE: 98.2 F | BODY MASS INDEX: 26.77 KG/M2 | HEIGHT: 70 IN | RESPIRATION RATE: 16 BRPM | HEART RATE: 64 BPM

## 2018-08-23 LAB
ALBUMIN SERPL-MCNC: 4.1 G/DL (ref 3.5–5.2)
ALBUMIN/GLOB SERPL: 1.3 G/DL
ALP SERPL-CCNC: 43 U/L (ref 39–117)
ALT SERPL W P-5'-P-CCNC: 33 U/L (ref 1–41)
ANION GAP SERPL CALCULATED.3IONS-SCNC: 11.2 MMOL/L
AST SERPL-CCNC: 24 U/L (ref 1–40)
BACTERIA UR QL AUTO: NORMAL /HPF
BASOPHILS # BLD AUTO: 0.02 10*3/MM3 (ref 0–0.2)
BASOPHILS NFR BLD AUTO: 0.2 % (ref 0–1.5)
BILIRUB SERPL-MCNC: 0.4 MG/DL (ref 0.1–1.2)
BILIRUB UR QL STRIP: NEGATIVE
BUN BLD-MCNC: 14 MG/DL (ref 8–23)
BUN/CREAT SERPL: 11.3 (ref 7–25)
CALCIUM SPEC-SCNC: 9.4 MG/DL (ref 8.6–10.5)
CHLORIDE SERPL-SCNC: 102 MMOL/L (ref 98–107)
CLARITY UR: CLEAR
CO2 SERPL-SCNC: 24.8 MMOL/L (ref 22–29)
COLOR UR: YELLOW
CREAT BLD-MCNC: 1.24 MG/DL (ref 0.76–1.27)
DEPRECATED RDW RBC AUTO: 50 FL (ref 37–54)
EOSINOPHIL # BLD AUTO: 0.22 10*3/MM3 (ref 0–0.7)
EOSINOPHIL NFR BLD AUTO: 1.7 % (ref 0.3–6.2)
ERYTHROCYTE [DISTWIDTH] IN BLOOD BY AUTOMATED COUNT: 14.1 % (ref 11.5–14.5)
GFR SERPL CREATININE-BSD FRML MDRD: 58 ML/MIN/1.73
GLOBULIN UR ELPH-MCNC: 3.1 GM/DL
GLUCOSE BLD-MCNC: 96 MG/DL (ref 65–99)
GLUCOSE UR STRIP-MCNC: NEGATIVE MG/DL
HCT VFR BLD AUTO: 46.3 % (ref 40.4–52.2)
HGB BLD-MCNC: 15.2 G/DL (ref 13.7–17.6)
HGB UR QL STRIP.AUTO: NEGATIVE
HYALINE CASTS UR QL AUTO: NORMAL /LPF
IMM GRANULOCYTES # BLD: 0.04 10*3/MM3 (ref 0–0.03)
IMM GRANULOCYTES NFR BLD: 0.3 % (ref 0–0.5)
KETONES UR QL STRIP: NEGATIVE
LEUKOCYTE ESTERASE UR QL STRIP.AUTO: NEGATIVE
LYMPHOCYTES # BLD AUTO: 2.13 10*3/MM3 (ref 0.9–4.8)
LYMPHOCYTES NFR BLD AUTO: 16.9 % (ref 19.6–45.3)
MCH RBC QN AUTO: 31.5 PG (ref 27–32.7)
MCHC RBC AUTO-ENTMCNC: 32.8 G/DL (ref 32.6–36.4)
MCV RBC AUTO: 95.9 FL (ref 79.8–96.2)
MONOCYTES # BLD AUTO: 1.65 10*3/MM3 (ref 0.2–1.2)
MONOCYTES NFR BLD AUTO: 13.1 % (ref 5–12)
NEUTROPHILS # BLD AUTO: 8.56 10*3/MM3 (ref 1.9–8.1)
NEUTROPHILS NFR BLD AUTO: 68.1 % (ref 42.7–76)
NITRITE UR QL STRIP: NEGATIVE
PH UR STRIP.AUTO: 6.5 [PH] (ref 5–8)
PLATELET # BLD AUTO: 202 10*3/MM3 (ref 140–500)
PMV BLD AUTO: 10.1 FL (ref 6–12)
POTASSIUM BLD-SCNC: 4.4 MMOL/L (ref 3.5–5.2)
PROT SERPL-MCNC: 7.2 G/DL (ref 6–8.5)
PROT UR QL STRIP: NEGATIVE
RBC # BLD AUTO: 4.83 10*6/MM3 (ref 4.6–6)
RBC # UR: NORMAL /HPF
REF LAB TEST METHOD: NORMAL
SODIUM BLD-SCNC: 138 MMOL/L (ref 136–145)
SP GR UR STRIP: 1.01 (ref 1–1.03)
SQUAMOUS #/AREA URNS HPF: NORMAL /HPF
UROBILINOGEN UR QL STRIP: NORMAL
WBC NRBC COR # BLD: 12.58 10*3/MM3 (ref 4.5–10.7)
WBC UR QL AUTO: NORMAL /HPF

## 2018-08-23 PROCEDURE — 85027 COMPLETE CBC AUTOMATED: CPT | Performed by: ORTHOPAEDIC SURGERY

## 2018-08-23 PROCEDURE — 81001 URINALYSIS AUTO W/SCOPE: CPT | Performed by: ORTHOPAEDIC SURGERY

## 2018-08-23 PROCEDURE — 80053 COMPREHEN METABOLIC PANEL: CPT | Performed by: ORTHOPAEDIC SURGERY

## 2018-08-23 PROCEDURE — 36415 COLL VENOUS BLD VENIPUNCTURE: CPT

## 2018-08-23 RX ORDER — SENNOSIDES 8.6 MG
650 CAPSULE ORAL EVERY 8 HOURS PRN
COMMUNITY
End: 2018-10-05

## 2018-08-23 NOTE — DISCHARGE INSTRUCTIONS
SURGERY  8-28-18  ARRIVAL TIME:   TO BE CALLED THE DAY BEFORE SURG    Take the following medications the morning of surgery with a small sip of water:    NONE        General Instructions:  • Do not eat solid food after midnight the night before surgery.  • You may drink clear liquids day of surgery but must stop at least one hour before your hospital arrival time.  • It is beneficial for you to have a clear drink that contains carbohydrates the day of surgery.  We suggest a 12 to 20 ounce bottle of Gatorade or Powerade for non-diabetic patients or a 12 to 20 ounce bottle of G2 or Powerade Zero for diabetic patients. (Pediatric patients, are not advised to drink a 12 to 20 ounce carbohydrate drink)    Clear liquids are liquids you can see through.  Nothing red in color.     Plain water                               Sports drinks  Sodas                                   Gelatin (Jell-O)  Fruit juices without pulp such as white grape juice and apple juice  Popsicles that contain no fruit or yogurt  Tea or coffee (no cream or milk added)  Gatorade / Powerade  G2 / Powerade Zero    • Infants may have breast milk up to four hours before surgery.  • Infants drinking formula may drink formula up to six hours before surgery.   • Patients who avoid smoking, chewing tobacco and alcohol for 4 weeks prior to surgery have a reduced risk of post-operative complications.  Quit smoking as many days before surgery as you can.  • Do not smoke, use chewing tobacco or drink alcohol the day of surgery.   • If applicable bring your C-PAP/ BI-PAP machine.  • Bring any papers given to you in the doctor’s office.  • Wear clean comfortable clothes and socks.  • Do not wear contact lenses or make-up.  Bring a case for your glasses.   • Bring crutches or walker if applicable.  • Remove all piercings.  Leave jewelry and any other valuables at home.  • Hair extensions with metal clips must be removed prior to surgery.  • The Pre-Admission Testing  nurse will instruct you to bring medications if unable to obtain an accurate list in Pre-Admission Testing.        If you were given a blood bank ID arm band remember to bring it with you the day of surgery.    Preventing a Surgical Site Infection:  • For 2 to 3 days before surgery, avoid shaving with a razor because the razor can irritate skin and make it easier to develop an infection.    • Any areas of open skin can increase the risk of a post-operative wound infection by allowing bacteria to enter and travel throughout the body.  Notify your surgeon if you have any skin wounds / rashes even if it is not near the expected surgical site.  The area will need assessed to determine if surgery should be delayed until it is healed.  • The night prior to surgery sleep in a clean bed with clean clothing.  Do not allow pets to sleep with you.  • Shower on the morning of surgery using a fresh bar of anti-bacterial soap (such as Dial) and clean washcloth.  Dry with a clean towel and dress in clean clothing.  • Ask your surgeon if you will be receiving antibiotics prior to surgery.  • Make sure you, your family, and all healthcare providers clean their hands with soap and water or an alcohol based hand  before caring for you or your wound.    Day of surgery:  Upon arrival, a Pre-op nurse and Anesthesiologist will review your health history, obtain vital signs, and answer questions you may have.  The only belongings needed at this time will be your home medications and if applicable your C-PAP/BI-PAP machine.  If you are staying overnight your family can leave the rest of your belongings in the car and bring them to your room later.  A Pre-op nurse will start an IV and you may receive medication in preparation for surgery, including something to help you relax.  Your family will be able to see you in the Pre-op area.  While you are in surgery your family should notify the waiting room  if they leave the  waiting room area and provide a contact phone number.    Please be aware that surgery does come with discomfort.  We want to make every effort to control your discomfort so please discuss any uncontrolled symptoms with your nurse.   Your doctor will most likely have prescribed pain medications.      If you are going home after surgery you will receive individualized written care instructions before being discharged.  A responsible adult must drive you to and from the hospital on the day of your surgery and stay with you for 24 hours.    If you are staying overnight following surgery, you will be transported to your hospital room following the recovery period.  [facility] has all private rooms.    You have received a list of surgical assistants for your reference.  If you have any questions please call Pre-Admission Testing at [phone].  Deductibles and co-payments are collected on the day of service. Please be prepared to pay the required co-pay, deductible or deposit on the day of service as defined by your plan.

## 2018-08-24 ENCOUNTER — EPISODE CHANGES (OUTPATIENT)
Dept: CASE MANAGEMENT | Facility: OTHER | Age: 68
End: 2018-08-24

## 2018-08-24 ENCOUNTER — PATIENT OUTREACH (OUTPATIENT)
Dept: CASE MANAGEMENT | Facility: OTHER | Age: 68
End: 2018-08-24

## 2018-08-24 ENCOUNTER — OFFICE VISIT (OUTPATIENT)
Dept: FAMILY MEDICINE CLINIC | Facility: CLINIC | Age: 68
End: 2018-08-24

## 2018-08-24 DIAGNOSIS — S81.812D LACERATION OF LEFT LOWER LEG, SUBSEQUENT ENCOUNTER: ICD-10-CM

## 2018-08-24 DIAGNOSIS — Z01.818 PRE-OPERATIVE CLEARANCE: Primary | ICD-10-CM

## 2018-08-24 PROBLEM — S81.812A LACERATION OF LEFT LOWER LEG: Status: ACTIVE | Noted: 2018-08-24

## 2018-08-24 PROCEDURE — 99213 OFFICE O/P EST LOW 20 MIN: CPT | Performed by: FAMILY MEDICINE

## 2018-08-24 RX ORDER — CEPHALEXIN 500 MG/1
500 CAPSULE ORAL 3 TIMES DAILY
Qty: 21 CAPSULE | Refills: 0 | Status: SHIPPED | OUTPATIENT
Start: 2018-08-24 | End: 2018-08-31

## 2018-08-24 NOTE — OUTREACH NOTE
Care Management Plan 8/24/2018   Lifestyle Goals Less pain;Increase physical activity;Routine follow-up with doctor(s)   Barriers Pain;Disease education   Self Management Medication Adherence;Increase Physical Activities   Annual Wellness Visit:  Patient Has Completed   Does patient have depression diagnosis? No   Ed Visits past 12 months: 1   Medication Adherence Medications understood   Goal Progress Making Progress Toward Goal(s)   Readiness Scale 9   Confidence Scale 8   Health Literacy Good     The main concerns and/or symptoms the patient would like to address are: Pt. Reports he will be having surgery on his collar bone 8/28/18, he has been started on a new antibiotic (Keflex) due to elevation of WBC on recent lab. Reviewed med use and side effects. Pt. States injuries to ankle during his fall are healing, reviewed s/s of infection with pt. Nurse provided patient education.No other questions or concerns voiced at this time.    Education/instruction provided by Care Coordinator: Explained role of Care Advisor and contact information given to patient.    Follow Up Outreach Due: next week    Lenora Alarcon RN

## 2018-08-24 NOTE — PROGRESS NOTES
Subjective   Perez Bolaños is a 68 y.o. male who is here for   Chief Complaint   Patient presents with   • Follow-up   • Abnormal Lab   .     History of Present Illness   Results for orders placed or performed in visit on 08/23/18   Comprehensive Metabolic Panel   Result Value Ref Range    Glucose 96 65 - 99 mg/dL    BUN 14 8 - 23 mg/dL    Creatinine 1.24 0.76 - 1.27 mg/dL    Sodium 138 136 - 145 mmol/L    Potassium 4.4 3.5 - 5.2 mmol/L    Chloride 102 98 - 107 mmol/L    CO2 24.8 22.0 - 29.0 mmol/L    Calcium 9.4 8.6 - 10.5 mg/dL    Total Protein 7.2 6.0 - 8.5 g/dL    Albumin 4.10 3.50 - 5.20 g/dL    ALT (SGPT) 33 1 - 41 U/L    AST (SGOT) 24 1 - 40 U/L    Alkaline Phosphatase 43 39 - 117 U/L    Total Bilirubin 0.4 0.1 - 1.2 mg/dL    eGFR Non African Amer 58 (L) >60 mL/min/1.73    Globulin 3.1 gm/dL    A/G Ratio 1.3 g/dL    BUN/Creatinine Ratio 11.3 7.0 - 25.0    Anion Gap 11.2 mmol/L   CBC Auto Differential   Result Value Ref Range    WBC 12.58 (H) 4.50 - 10.70 10*3/mm3    RBC 4.83 4.60 - 6.00 10*6/mm3    Hemoglobin 15.2 13.7 - 17.6 g/dL    Hematocrit 46.3 40.4 - 52.2 %    MCV 95.9 79.8 - 96.2 fL    MCH 31.5 27.0 - 32.7 pg    MCHC 32.8 32.6 - 36.4 g/dL    RDW 14.1 11.5 - 14.5 %    RDW-SD 50.0 37.0 - 54.0 fl    MPV 10.1 6.0 - 12.0 fL    Platelets 202 140 - 500 10*3/mm3    Neutrophil % 68.1 42.7 - 76.0 %    Lymphocyte % 16.9 (L) 19.6 - 45.3 %    Monocyte % 13.1 (H) 5.0 - 12.0 %    Eosinophil % 1.7 0.3 - 6.2 %    Basophil % 0.2 0.0 - 1.5 %    Immature Grans % 0.3 0.0 - 0.5 %    Neutrophils, Absolute 8.56 (H) 1.90 - 8.10 10*3/mm3    Lymphocytes, Absolute 2.13 0.90 - 4.80 10*3/mm3    Monocytes, Absolute 1.65 (H) 0.20 - 1.20 10*3/mm3    Eosinophils, Absolute 0.22 0.00 - 0.70 10*3/mm3    Basophils, Absolute 0.02 0.00 - 0.20 10*3/mm3    Immature Grans, Absolute 0.04 (H) 0.00 - 0.03 10*3/mm3   Urinalysis without microscopic (no culture) - Urine, Clean Catch   Result Value Ref Range    Color, UA Yellow Yellow, Straw     Appearance, UA Clear Clear    pH, UA 6.5 5.0 - 8.0    Specific Gravity, UA 1.011 1.005 - 1.030    Glucose, UA Negative Negative    Ketones, UA Negative Negative    Bilirubin, UA Negative Negative    Blood, UA Negative Negative    Protein, UA Negative Negative    Leuk Esterase, UA Negative Negative    Nitrite, UA Negative Negative    Urobilinogen, UA 0.2 E.U./dL 0.2 - 1.0 E.U./dL   Urinalysis, Microscopic Only - Urine, Clean Catch   Result Value Ref Range    RBC, UA 0-2 None Seen, 0-2 /HPF    WBC, UA 0-2 None Seen, 0-2 /HPF    Bacteria, UA None Seen None Seen /HPF    Squamous Epithelial Cells, UA 0-2 None Seen, 0-2 /HPF    Hyaline Casts, UA None Seen None Seen /LPF    Methodology Automated Microscopy      WBC is evelvated on pre op labs.        The following portions of the patient's history were reviewed and updated as appropriate: allergies, current medications, past family history, past medical history, past social history, past surgical history and problem list.    Review of Systems   Constitutional: Negative for chills, diaphoresis, fatigue, fever and unexpected weight change.   HENT: Negative.    Eyes: Negative.    Respiratory: Negative.    Cardiovascular: Negative.    Gastrointestinal: Negative.    Genitourinary: Negative.    Skin: Positive for wound.       Objective   Physical Exam   Constitutional: He appears well-developed and well-nourished.   HENT:   Mouth/Throat: Oropharynx is clear and moist.   Eyes: Conjunctivae are normal.   Neck: Normal range of motion.   Cardiovascular: Normal rate.    Pulmonary/Chest: Effort normal.   Skin:        Nursing note and vitals reviewed.      Assessment/Plan   Perez was seen today for follow-up and abnormal lab.    Diagnoses and all orders for this visit:    Pre-operative clearance  -     cephalexin (KEFLEX) 500 MG capsule; Take 1 capsule by mouth 3 (Three) Times a Day for 7 days.    Laceration of left lower leg, subsequent encounter  -     cephalexin (KEFLEX) 500 MG  capsule; Take 1 capsule by mouth 3 (Three) Times a Day for 7 days.  -     CBC w AUTO Differential; Future      Patient Instructions   Start Keflex for infected lac on leg  Repeat CBC on Monday at [facility].  Should be ok for surgery on Tuesday        There are no discontinued medications.     Return in about 7 days (around 8/31/2018) for wound/suture check.    Dr. Nav Carmona  [place], [place]

## 2018-08-24 NOTE — PATIENT INSTRUCTIONS
Start Keflex for infected lac on leg  Repeat CBC on Monday at [facility].  Should be ok for surgery on Tuesday

## 2018-08-27 ENCOUNTER — LAB (OUTPATIENT)
Dept: LAB | Facility: HOSPITAL | Age: 68
End: 2018-08-27

## 2018-08-27 DIAGNOSIS — S81.812D LACERATION OF LEFT LOWER LEG, SUBSEQUENT ENCOUNTER: ICD-10-CM

## 2018-08-27 PROCEDURE — 36415 COLL VENOUS BLD VENIPUNCTURE: CPT

## 2018-08-27 PROCEDURE — 85025 COMPLETE CBC W/AUTO DIFF WBC: CPT

## 2018-08-28 ENCOUNTER — ANESTHESIA (OUTPATIENT)
Dept: PERIOP | Facility: HOSPITAL | Age: 68
End: 2018-08-28

## 2018-08-28 ENCOUNTER — HOSPITAL ENCOUNTER (OUTPATIENT)
Facility: HOSPITAL | Age: 68
Setting detail: HOSPITAL OUTPATIENT SURGERY
Discharge: HOME | End: 2018-08-28
Attending: ORTHOPAEDIC SURGERY | Admitting: ORTHOPAEDIC SURGERY

## 2018-08-28 ENCOUNTER — APPOINTMENT (OUTPATIENT)
Dept: GENERAL RADIOLOGY | Facility: HOSPITAL | Age: 68
End: 2018-08-28

## 2018-08-28 ENCOUNTER — ANESTHESIA EVENT (OUTPATIENT)
Dept: PERIOP | Facility: HOSPITAL | Age: 68
End: 2018-08-28

## 2018-08-28 VITALS
SYSTOLIC BLOOD PRESSURE: 140 MMHG | RESPIRATION RATE: 16 BRPM | DIASTOLIC BLOOD PRESSURE: 91 MMHG | HEART RATE: 70 BPM | OXYGEN SATURATION: 96 % | TEMPERATURE: 98.1 F

## 2018-08-28 PROCEDURE — C1713 ANCHOR/SCREW BN/BN,TIS/BN: HCPCS | Performed by: ORTHOPAEDIC SURGERY

## 2018-08-28 PROCEDURE — 25010000002 FENTANYL CITRATE (PF) 100 MCG/2ML SOLUTION: Performed by: ANESTHESIOLOGY

## 2018-08-28 PROCEDURE — 76000 FLUOROSCOPY <1 HR PHYS/QHP: CPT

## 2018-08-28 PROCEDURE — 25010000003 CEFAZOLIN IN DEXTROSE 2-4 GM/100ML-% SOLUTION: Performed by: ORTHOPAEDIC SURGERY

## 2018-08-28 PROCEDURE — 25010000002 PROPOFOL 10 MG/ML EMULSION: Performed by: NURSE ANESTHETIST, CERTIFIED REGISTERED

## 2018-08-28 PROCEDURE — 25010000002 MIDAZOLAM PER 1 MG: Performed by: ANESTHESIOLOGY

## 2018-08-28 PROCEDURE — 25010000002 ROPIVACAINE PER 1 MG: Performed by: ANESTHESIOLOGY

## 2018-08-28 PROCEDURE — 25010000002 PHENYLEPHRINE PER 1 ML: Performed by: NURSE ANESTHETIST, CERTIFIED REGISTERED

## 2018-08-28 PROCEDURE — 25010000002 FENTANYL CITRATE (PF) 100 MCG/2ML SOLUTION: Performed by: NURSE ANESTHETIST, CERTIFIED REGISTERED

## 2018-08-28 PROCEDURE — 73000 X-RAY EXAM OF COLLAR BONE: CPT

## 2018-08-28 DEVICE — PUTTY DBM PROGENIX PLS 2.5CC: Type: IMPLANTABLE DEVICE | Site: CLAVICLE | Status: FUNCTIONAL

## 2018-08-28 DEVICE — 3.5MM X 10MM NON-LOCKING HEXALOBE SCREW
Type: IMPLANTABLE DEVICE | Site: CLAVICLE | Status: FUNCTIONAL
Brand: ACUMED

## 2018-08-28 DEVICE — NRW PROF CLAVICLE PLATE, 8-HOLE STR LEFT
Type: IMPLANTABLE DEVICE | Site: CLAVICLE | Status: FUNCTIONAL
Brand: ACUMED

## 2018-08-28 DEVICE — 3.5MM X 14MM NON-LOCKING HEXALOBE SCREW
Type: IMPLANTABLE DEVICE | Site: CLAVICLE | Status: FUNCTIONAL
Brand: ACUMED

## 2018-08-28 RX ORDER — PROMETHAZINE HYDROCHLORIDE 25 MG/1
25 SUPPOSITORY RECTAL ONCE AS NEEDED
Status: DISCONTINUED | OUTPATIENT
Start: 2018-08-28 | End: 2018-08-28 | Stop reason: HOSPADM

## 2018-08-28 RX ORDER — PROMETHAZINE HYDROCHLORIDE 25 MG/1
25 TABLET ORAL ONCE AS NEEDED
Status: DISCONTINUED | OUTPATIENT
Start: 2018-08-28 | End: 2018-08-28 | Stop reason: HOSPADM

## 2018-08-28 RX ORDER — SODIUM CHLORIDE 0.9 % (FLUSH) 0.9 %
1-10 SYRINGE (ML) INJECTION AS NEEDED
Status: DISCONTINUED | OUTPATIENT
Start: 2018-08-28 | End: 2018-08-28 | Stop reason: HOSPADM

## 2018-08-28 RX ORDER — FENTANYL CITRATE 50 UG/ML
50 INJECTION, SOLUTION INTRAMUSCULAR; INTRAVENOUS
Status: DISCONTINUED | OUTPATIENT
Start: 2018-08-28 | End: 2018-08-28 | Stop reason: HOSPADM

## 2018-08-28 RX ORDER — PROPOFOL 10 MG/ML
VIAL (ML) INTRAVENOUS AS NEEDED
Status: DISCONTINUED | OUTPATIENT
Start: 2018-08-28 | End: 2018-08-28 | Stop reason: SURG

## 2018-08-28 RX ORDER — ROPIVACAINE HYDROCHLORIDE 2 MG/ML
INJECTION, SOLUTION EPIDURAL; INFILTRATION; PERINEURAL AS NEEDED
Status: DISCONTINUED | OUTPATIENT
Start: 2018-08-28 | End: 2018-08-28 | Stop reason: SURG

## 2018-08-28 RX ORDER — ROCURONIUM BROMIDE 10 MG/ML
INJECTION, SOLUTION INTRAVENOUS AS NEEDED
Status: DISCONTINUED | OUTPATIENT
Start: 2018-08-28 | End: 2018-08-28 | Stop reason: SURG

## 2018-08-28 RX ORDER — MIDAZOLAM HYDROCHLORIDE 1 MG/ML
2 INJECTION INTRAMUSCULAR; INTRAVENOUS
Status: DISCONTINUED | OUTPATIENT
Start: 2018-08-28 | End: 2018-08-28 | Stop reason: HOSPADM

## 2018-08-28 RX ORDER — LIDOCAINE HYDROCHLORIDE 20 MG/ML
INJECTION, SOLUTION INFILTRATION; PERINEURAL AS NEEDED
Status: DISCONTINUED | OUTPATIENT
Start: 2018-08-28 | End: 2018-08-28 | Stop reason: SURG

## 2018-08-28 RX ORDER — CEFAZOLIN SODIUM 2 G/100ML
2 INJECTION, SOLUTION INTRAVENOUS ONCE
Status: COMPLETED | OUTPATIENT
Start: 2018-08-28 | End: 2018-08-28

## 2018-08-28 RX ORDER — SODIUM CHLORIDE, SODIUM LACTATE, POTASSIUM CHLORIDE, CALCIUM CHLORIDE 600; 310; 30; 20 MG/100ML; MG/100ML; MG/100ML; MG/100ML
9 INJECTION, SOLUTION INTRAVENOUS CONTINUOUS
Status: DISCONTINUED | OUTPATIENT
Start: 2018-08-28 | End: 2018-08-28 | Stop reason: HOSPADM

## 2018-08-28 RX ORDER — FENTANYL CITRATE 50 UG/ML
25 INJECTION, SOLUTION INTRAMUSCULAR; INTRAVENOUS
Status: DISCONTINUED | OUTPATIENT
Start: 2018-08-28 | End: 2018-08-28

## 2018-08-28 RX ORDER — NALBUPHINE HCL 10 MG/ML
2 AMPUL (ML) INJECTION EVERY 4 HOURS PRN
Status: DISCONTINUED | OUTPATIENT
Start: 2018-08-28 | End: 2018-08-28 | Stop reason: HOSPADM

## 2018-08-28 RX ORDER — DIPHENHYDRAMINE HYDROCHLORIDE 50 MG/ML
12.5 INJECTION INTRAMUSCULAR; INTRAVENOUS
Status: DISCONTINUED | OUTPATIENT
Start: 2018-08-28 | End: 2018-08-28 | Stop reason: HOSPADM

## 2018-08-28 RX ORDER — FENTANYL CITRATE 50 UG/ML
INJECTION, SOLUTION INTRAMUSCULAR; INTRAVENOUS AS NEEDED
Status: DISCONTINUED | OUTPATIENT
Start: 2018-08-28 | End: 2018-08-28 | Stop reason: SURG

## 2018-08-28 RX ORDER — ACETAMINOPHEN 325 MG/1
650 TABLET ORAL ONCE
Status: COMPLETED | OUTPATIENT
Start: 2018-08-28 | End: 2018-08-28

## 2018-08-28 RX ORDER — PROMETHAZINE HYDROCHLORIDE 25 MG/ML
6.25 INJECTION, SOLUTION INTRAMUSCULAR; INTRAVENOUS ONCE AS NEEDED
Status: DISCONTINUED | OUTPATIENT
Start: 2018-08-28 | End: 2018-08-28 | Stop reason: HOSPADM

## 2018-08-28 RX ORDER — BUPIVACAINE HYDROCHLORIDE AND EPINEPHRINE 2.5; 5 MG/ML; UG/ML
INJECTION, SOLUTION EPIDURAL; INFILTRATION; INTRACAUDAL; PERINEURAL AS NEEDED
Status: DISCONTINUED | OUTPATIENT
Start: 2018-08-28 | End: 2018-08-28 | Stop reason: SURG

## 2018-08-28 RX ORDER — OXYCODONE HYDROCHLORIDE AND ACETAMINOPHEN 5; 325 MG/1; MG/1
1 TABLET ORAL ONCE AS NEEDED
Status: DISCONTINUED | OUTPATIENT
Start: 2018-08-28 | End: 2018-08-28 | Stop reason: HOSPADM

## 2018-08-28 RX ORDER — MIDAZOLAM HYDROCHLORIDE 1 MG/ML
1 INJECTION INTRAMUSCULAR; INTRAVENOUS
Status: DISCONTINUED | OUTPATIENT
Start: 2018-08-28 | End: 2018-08-28 | Stop reason: HOSPADM

## 2018-08-28 RX ORDER — TADALAFIL 20 MG/1
20 TABLET ORAL DAILY PRN
COMMUNITY
End: 2020-06-26

## 2018-08-28 RX ORDER — WOUND DRESSING ADHESIVE - LIQUID
LIQUID MISCELLANEOUS AS NEEDED
Status: DISCONTINUED | OUTPATIENT
Start: 2018-08-28 | End: 2018-08-28 | Stop reason: HOSPADM

## 2018-08-28 RX ORDER — EPHEDRINE SULFATE 50 MG/ML
INJECTION, SOLUTION INTRAVENOUS AS NEEDED
Status: DISCONTINUED | OUTPATIENT
Start: 2018-08-28 | End: 2018-08-28 | Stop reason: SURG

## 2018-08-28 RX ORDER — ACETAMINOPHEN 650 MG/1
650 SUPPOSITORY RECTAL ONCE AS NEEDED
Status: DISCONTINUED | OUTPATIENT
Start: 2018-08-28 | End: 2018-08-28 | Stop reason: HOSPADM

## 2018-08-28 RX ORDER — NALOXONE HCL 0.4 MG/ML
0.4 VIAL (ML) INJECTION AS NEEDED
Status: DISCONTINUED | OUTPATIENT
Start: 2018-08-28 | End: 2018-08-28 | Stop reason: HOSPADM

## 2018-08-28 RX ORDER — FENTANYL CITRATE 50 UG/ML
25 INJECTION, SOLUTION INTRAMUSCULAR; INTRAVENOUS
Status: DISCONTINUED | OUTPATIENT
Start: 2018-08-28 | End: 2018-08-28 | Stop reason: HOSPADM

## 2018-08-28 RX ORDER — NALBUPHINE HCL 10 MG/ML
10 AMPUL (ML) INJECTION EVERY 4 HOURS PRN
Status: DISCONTINUED | OUTPATIENT
Start: 2018-08-28 | End: 2018-08-28 | Stop reason: HOSPADM

## 2018-08-28 RX ORDER — ACETAMINOPHEN 325 MG/1
650 TABLET ORAL ONCE AS NEEDED
Status: DISCONTINUED | OUTPATIENT
Start: 2018-08-28 | End: 2018-08-28 | Stop reason: HOSPADM

## 2018-08-28 RX ORDER — LIDOCAINE HYDROCHLORIDE 10 MG/ML
0.5 INJECTION, SOLUTION EPIDURAL; INFILTRATION; INTRACAUDAL; PERINEURAL ONCE AS NEEDED
Status: DISCONTINUED | OUTPATIENT
Start: 2018-08-28 | End: 2018-08-28 | Stop reason: HOSPADM

## 2018-08-28 RX ADMIN — ROPIVACAINE HYDROCHLORIDE 15 ML: 2 INJECTION, SOLUTION EPIDURAL; INFILTRATION at 08:54

## 2018-08-28 RX ADMIN — FENTANYL CITRATE 50 MCG: 50 INJECTION, SOLUTION INTRAMUSCULAR; INTRAVENOUS at 11:28

## 2018-08-28 RX ADMIN — OXYCODONE HYDROCHLORIDE AND ACETAMINOPHEN 1 TABLET: 5; 325 TABLET ORAL at 11:38

## 2018-08-28 RX ADMIN — EPHEDRINE SULFATE 10 MG: 50 INJECTION INTRAMUSCULAR; INTRAVENOUS; SUBCUTANEOUS at 10:10

## 2018-08-28 RX ADMIN — SODIUM CHLORIDE, POTASSIUM CHLORIDE, SODIUM LACTATE AND CALCIUM CHLORIDE: 600; 310; 30; 20 INJECTION, SOLUTION INTRAVENOUS at 09:28

## 2018-08-28 RX ADMIN — SODIUM CHLORIDE, POTASSIUM CHLORIDE, SODIUM LACTATE AND CALCIUM CHLORIDE 9 ML/HR: 600; 310; 30; 20 INJECTION, SOLUTION INTRAVENOUS at 08:04

## 2018-08-28 RX ADMIN — SUGAMMADEX 200 MG: 100 INJECTION, SOLUTION INTRAVENOUS at 10:49

## 2018-08-28 RX ADMIN — ROCURONIUM BROMIDE 40 MG: 10 INJECTION INTRAVENOUS at 09:28

## 2018-08-28 RX ADMIN — LIDOCAINE HYDROCHLORIDE 60 MG: 20 INJECTION, SOLUTION INFILTRATION; PERINEURAL at 09:28

## 2018-08-28 RX ADMIN — PHENYLEPHRINE HYDROCHLORIDE 80 MCG: 10 INJECTION INTRAVENOUS at 10:17

## 2018-08-28 RX ADMIN — FENTANYL CITRATE 50 MCG: 50 INJECTION, SOLUTION INTRAMUSCULAR; INTRAVENOUS at 11:16

## 2018-08-28 RX ADMIN — ACETAMINOPHEN 650 MG: 325 TABLET, FILM COATED ORAL at 08:39

## 2018-08-28 RX ADMIN — PROPOFOL 150 MG: 10 INJECTION, EMULSION INTRAVENOUS at 09:28

## 2018-08-28 RX ADMIN — MIDAZOLAM HYDROCHLORIDE 1 MG: 2 INJECTION, SOLUTION INTRAMUSCULAR; INTRAVENOUS at 08:42

## 2018-08-28 RX ADMIN — CEFAZOLIN SODIUM 2 G: 2 INJECTION, SOLUTION INTRAVENOUS at 09:25

## 2018-08-28 RX ADMIN — FENTANYL CITRATE 50 MCG: 50 INJECTION INTRAMUSCULAR; INTRAVENOUS at 09:50

## 2018-08-28 RX ADMIN — BUPIVACAINE HYDROCHLORIDE AND EPINEPHRINE BITARTRATE 6 ML: 2.5; .0091 INJECTION, SOLUTION EPIDURAL; INFILTRATION; INTRACAUDAL; PERINEURAL at 08:54

## 2018-08-28 NOTE — ANESTHESIA PROCEDURE NOTES
Peripheral Block    Patient location during procedure: pre-op  Start time: 8/28/2018 8:52 AM  Stop time: 8/28/2018 8:56 AM  Reason for block: at surgeon's request and post-op pain management  Performed by  Anesthesiologist: JAN LOPEZ  Preanesthetic Checklist  Completed: patient identified, site marked, surgical consent, pre-op evaluation, timeout performed, IV checked, risks and benefits discussed and monitors and equipment checked  Prep:  Sterile barriers:gloves  Prep: ChloraPrep  Patient monitoring: blood pressure monitoring, continuous pulse oximetry and EKG  Procedure  Sedation:yes    Guidance:ultrasound guided  ULTRASOUND INTERPRETATION.  Using ultrasound guidance a 22 G gauge needle was placed in close proximity to the brachial plexus nerve, at which point, under ultrasound guidance anesthetic was injected in the area of the nerve and spread of the anesthesia was seen on ultrasound in close proximity thereto.  There were no abnormalities seen on ultrasound; a digital image was taken; and the patient tolerated the procedure with no complications. Images:still images obtained    Laterality:left  Block Type:interscalene  Injection Technique:single-shot  Needle Type:short-bevel  Needle Gauge:22 G    Medications  Analgesia: Dexamethasone 4mg.  Comment:Superficial cervical plexus w 6ml 0.25% bupiv. usnder   Local Injected:ropivacaine 0.2% Local Amount Injected:15mL  Post Assessment  Injection Assessment: negative aspiration for heme, no paresthesia on injection and incremental injection  Patient Tolerance:comfortable throughout block  Complications:no

## 2018-08-28 NOTE — ANESTHESIA PROCEDURE NOTES
Airway  Urgency: elective    Date/Time: 8/28/2018 9:32 AM  Airway not difficult    General Information and Staff    Patient location during procedure: OR  Anesthesiologist: RENDER, JAN RAY  CRNA: YENI RIVAS    Indications and Patient Condition  Indications for airway management: airway protection    Preoxygenated: yes  MILS not maintained throughout  Mask difficulty assessment: 1 - vent by mask    Final Airway Details  Final airway type: endotracheal airway      Successful airway: ETT  Cuffed: yes   Successful intubation technique: direct laryngoscopy  Endotracheal tube insertion site: oral  Blade: Elaina  Blade size: 3  ETT size: 7.5 mm  Cormack-Lehane Classification: grade IIa - partial view of glottis  Placement verified by: chest auscultation and capnometry   Measured from: lips  ETT to lips (cm): 22  Number of attempts at approach: 1    Additional Comments  PreO2 100%, FEO2 >85, SIVI, easy BMV, sniff position, ETT placed/ confirmed, attraumatic, teeth and lips as preop.

## 2018-08-28 NOTE — ANESTHESIA POSTPROCEDURE EVALUATION
Patient: Perez Bolaños    Procedure Summary     Date:  08/28/18 Room / Location:   [facility]  [facility]    Anesthesia Start:  0923 Anesthesia Stop:  1102    Procedure:  LT CLAVICLE OPEN REDUCTION INTERNAL FIXATION (Left Shoulder) Diagnosis:      Surgeon:  Tyler Jean MD Provider:  Liam Brown MD    Anesthesia Type:  general, regional ASA Status:  3          Anesthesia Type: general, regional  Last vitals  BP   140/91 (08/28/18 1159)   Temp   36.7 °C (98.1 °F) (08/28/18 1145)   Pulse   70 (08/28/18 1159)   Resp   16 (08/28/18 1159)     SpO2   96 % (08/28/18 1159)     Post Anesthesia Care and Evaluation    Patient location during evaluation: bedside  Patient participation: complete - patient participated  Level of consciousness: awake and alert  Pain management: adequate  Airway patency: patent  Anesthetic complications: No anesthetic complications    Cardiovascular status: acceptable  Respiratory status: acceptable  Hydration status: acceptable    Comments: /91 (BP Location: Right arm, Patient Position: Lying)   Pulse 70   Temp 36.7 °C (98.1 °F)   Resp 16   SpO2 96%

## 2018-08-28 NOTE — ANESTHESIA PREPROCEDURE EVALUATION
Anesthesia Evaluation     NPO Solid Status: > 8 hours             Airway   Mallampati: II  TM distance: >3 FB  Neck ROM: full  No difficulty expected  Dental - normal exam     Pulmonary - normal exam   (+) sleep apnea,   (-) not a smoker  Cardiovascular     ECG reviewed  Rhythm: regular    (+) valvular problems/murmurs MR, hyperlipidemia,   (-) murmur    ROS comment: MR? No M noted    Neuro/Psych  (+) headaches,     GI/Hepatic/Renal/Endo    (+)  GERD, PUD,      Musculoskeletal     Abdominal    Substance History      OB/GYN          Other                        Anesthesia Plan    ASA 3     general and regional     intravenous induction   Anesthetic plan and risks discussed with patient.

## 2018-08-29 ENCOUNTER — PATIENT OUTREACH (OUTPATIENT)
Dept: CASE MANAGEMENT | Facility: OTHER | Age: 68
End: 2018-08-29

## 2018-08-29 NOTE — OUTREACH NOTE
Pt. Reports shoulder surgery done 8/28/18 was a success, he is using a sling at all times & ice to affected area. He is using his pain meds as directed and keeping arm elevated. Teaching done on s/s of infection and need to notify MD if seen. Reviewed Health Maintenance Gaps with patient, he will inquire about his AAA screening on 8/31/18 visit. Nurse provided patient education.No other questions or concerns voiced at this time. MyChart is used by patient.

## 2018-08-31 ENCOUNTER — OFFICE VISIT (OUTPATIENT)
Dept: FAMILY MEDICINE CLINIC | Facility: CLINIC | Age: 68
End: 2018-08-31

## 2018-08-31 VITALS
DIASTOLIC BLOOD PRESSURE: 80 MMHG | WEIGHT: 182 LBS | SYSTOLIC BLOOD PRESSURE: 128 MMHG | OXYGEN SATURATION: 95 % | HEART RATE: 67 BPM | BODY MASS INDEX: 26.05 KG/M2 | HEIGHT: 70 IN

## 2018-08-31 DIAGNOSIS — S81.812S LACERATION OF LEFT LOWER LEG, SEQUELA: Primary | ICD-10-CM

## 2018-08-31 PROCEDURE — 99024 POSTOP FOLLOW-UP VISIT: CPT | Performed by: FAMILY MEDICINE

## 2018-08-31 RX ORDER — OXYCODONE AND ACETAMINOPHEN 7.5; 325 MG/1; MG/1
TABLET ORAL
COMMUNITY
Start: 2018-08-28 | End: 2018-10-05

## 2018-08-31 RX ORDER — PROMETHAZINE HYDROCHLORIDE 25 MG/1
TABLET ORAL
COMMUNITY
Start: 2018-08-28 | End: 2018-10-05

## 2018-09-21 DIAGNOSIS — Z51.81 MEDICATION MONITORING ENCOUNTER: ICD-10-CM

## 2018-09-21 DIAGNOSIS — E55.9 VITAMIN D DEFICIENCY: ICD-10-CM

## 2018-09-21 DIAGNOSIS — E78.2 MIXED HYPERLIPIDEMIA: ICD-10-CM

## 2018-09-21 DIAGNOSIS — R79.89 LOW TESTOSTERONE LEVEL IN MALE: ICD-10-CM

## 2018-09-26 LAB
25(OH)D3+25(OH)D2 SERPL-MCNC: >60 NG/ML
ALT SERPL-CCNC: 17 U/L (ref 5–41)
CHOLEST SERPL-MCNC: 141 MG/DL (ref 0–200)
HDLC SERPL-MCNC: 56 MG/DL (ref 40–60)
LDLC SERPL CALC-MCNC: 72 MG/DL (ref 0–100)
TESTOST FREE SERPL-MCNC: 11.9 PG/ML (ref 6.6–18.1)
TESTOST SERPL-MCNC: 872 NG/DL (ref 264–916)
TRIGL SERPL-MCNC: 64 MG/DL (ref 0–150)
VLDLC SERPL CALC-MCNC: 12.8 MG/DL (ref 8–32)

## 2018-10-05 ENCOUNTER — OFFICE VISIT (OUTPATIENT)
Dept: FAMILY MEDICINE CLINIC | Facility: CLINIC | Age: 68
End: 2018-10-05

## 2018-10-05 VITALS
HEART RATE: 64 BPM | WEIGHT: 185 LBS | BODY MASS INDEX: 26.48 KG/M2 | OXYGEN SATURATION: 96 % | SYSTOLIC BLOOD PRESSURE: 126 MMHG | DIASTOLIC BLOOD PRESSURE: 82 MMHG | HEIGHT: 70 IN

## 2018-10-05 DIAGNOSIS — E78.2 MIXED HYPERLIPIDEMIA: Primary | ICD-10-CM

## 2018-10-05 PROCEDURE — 99213 OFFICE O/P EST LOW 20 MIN: CPT | Performed by: FAMILY MEDICINE

## 2018-10-05 NOTE — PROGRESS NOTES
Subjective   Perez Bolaños is a 68 y.o. male who is here for   Chief Complaint   Patient presents with   • Follow-up   • Hyperlipidemia   .     History of Present Illness   Hyperlipidemia: Patient presents with hyperlipidemia.  He was tested because known problems  On statin.  His last labs showed Total cholesterol of <200, HDL ,, LDL ,,  Triglycerides ,. negative. There is a family history of hyperlipidemia. There is not a family history of early ischemia heart disease.    Vit D was > 60  Was on 25k once a week  Ok to dec to 1000 IU a day    Did very well with left clavicle ORIF  Will start PT next week in [place]    Skin lac healed nicely.      The following portions of the patient's history were reviewed and updated as appropriate: allergies, current medications, past family history, past medical history, past social history, past surgical history and problem list.    Review of Systems    Objective   Physical Exam   Constitutional: He appears well-developed and well-nourished.   Cardiovascular: Normal rate.    Pulmonary/Chest: Effort normal.   Neurological: He is alert.   Psychiatric: He has a normal mood and affect.   Nursing note and vitals reviewed.      Assessment/Plan   Perez was seen today for follow-up and hyperlipidemia.    Diagnoses and all orders for this visit:    Mixed hyperlipidemia      There are no Patient Instructions on file for this visit.    Medications Discontinued During This Encounter   Medication Reason   • promethazine (PHENERGAN) 25 MG tablet *Therapy completed   • oxyCODONE-acetaminophen (PERCOCET) 7.5-325 MG per tablet *Therapy completed   • Calcium 200 MG tablet *Therapy completed   • acetaminophen (TYLENOL) 650 MG 8 hr tablet *Therapy completed        Return in about 6 months (around 4/5/2019) for Medicare Wellness visit.    Dr. Nav Carmona  [facility]  [place], [place]

## 2018-10-23 ENCOUNTER — EPISODE CHANGES (OUTPATIENT)
Dept: CASE MANAGEMENT | Facility: OTHER | Age: 68
End: 2018-10-23

## 2019-04-09 DIAGNOSIS — N52.9 ERECTILE DYSFUNCTION, UNSPECIFIED ERECTILE DYSFUNCTION TYPE: ICD-10-CM

## 2019-04-09 DIAGNOSIS — Z51.81 ENCOUNTER FOR MEDICATION MONITORING: ICD-10-CM

## 2019-04-09 DIAGNOSIS — R53.83 OTHER FATIGUE: ICD-10-CM

## 2019-04-09 DIAGNOSIS — Z12.5 PROSTATE CANCER SCREENING: ICD-10-CM

## 2019-04-09 DIAGNOSIS — R31.9 HEMATURIA, UNSPECIFIED TYPE: ICD-10-CM

## 2019-04-09 DIAGNOSIS — E55.9 VITAMIN D DEFICIENCY: ICD-10-CM

## 2019-04-09 DIAGNOSIS — E78.5 HYPERLIPIDEMIA, UNSPECIFIED HYPERLIPIDEMIA TYPE: Primary | ICD-10-CM

## 2019-04-09 DIAGNOSIS — Z12.5 PROSTATE CANCER SCREENING: Primary | ICD-10-CM

## 2019-04-12 LAB
25(OH)D3+25(OH)D2 SERPL-MCNC: 84.5 NG/ML (ref 30–100)
ALT SERPL-CCNC: 19 U/L (ref 1–41)
BUN SERPL-MCNC: 17 MG/DL (ref 8–23)
BUN/CREAT SERPL: 12.6 (ref 7–25)
CALCIUM SERPL-MCNC: 9.4 MG/DL (ref 8.6–10.5)
CHLORIDE SERPL-SCNC: 104 MMOL/L (ref 98–107)
CHOLEST SERPL-MCNC: 135 MG/DL (ref 0–200)
CO2 SERPL-SCNC: 25.8 MMOL/L (ref 22–29)
CREAT SERPL-MCNC: 1.35 MG/DL (ref 0.76–1.27)
ERYTHROCYTE [DISTWIDTH] IN BLOOD BY AUTOMATED COUNT: 13.5 % (ref 12.3–15.4)
GLUCOSE SERPL-MCNC: 107 MG/DL (ref 65–99)
HCT VFR BLD AUTO: 46.3 % (ref 37.5–51)
HDLC SERPL-MCNC: 55 MG/DL (ref 40–60)
HGB BLD-MCNC: 14.6 G/DL (ref 13–17.7)
LDLC SERPL CALC-MCNC: 66 MG/DL (ref 0–100)
MCH RBC QN AUTO: 29.9 PG (ref 26.6–33)
MCHC RBC AUTO-ENTMCNC: 31.5 G/DL (ref 31.5–35.7)
MCV RBC AUTO: 94.9 FL (ref 79–97)
PLATELET # BLD AUTO: 239 10*3/MM3 (ref 140–450)
POTASSIUM SERPL-SCNC: 4.6 MMOL/L (ref 3.5–5.2)
PSA SERPL-MCNC: 0.34 NG/ML (ref 0–4)
RBC # BLD AUTO: 4.88 10*6/MM3 (ref 4.14–5.8)
SODIUM SERPL-SCNC: 140 MMOL/L (ref 136–145)
TESTOST FREE SERPL-MCNC: 11.9 PG/ML (ref 6.6–18.1)
TESTOST SERPL-MCNC: 796 NG/DL (ref 264–916)
TRIGL SERPL-MCNC: 70 MG/DL (ref 0–150)
TSH SERPL DL<=0.005 MIU/L-ACNC: 1.78 MIU/ML (ref 0.27–4.2)
VLDLC SERPL CALC-MCNC: 14 MG/DL
WBC # BLD AUTO: 7.18 10*3/MM3 (ref 3.4–10.8)

## 2019-04-14 ENCOUNTER — RESULTS ENCOUNTER (OUTPATIENT)
Dept: FAMILY MEDICINE CLINIC | Facility: CLINIC | Age: 69
End: 2019-04-14

## 2019-04-14 DIAGNOSIS — Z12.5 PROSTATE CANCER SCREENING: ICD-10-CM

## 2019-04-17 ENCOUNTER — OFFICE VISIT (OUTPATIENT)
Dept: FAMILY MEDICINE CLINIC | Facility: CLINIC | Age: 69
End: 2019-04-17

## 2019-04-17 VITALS
HEIGHT: 70 IN | OXYGEN SATURATION: 98 % | SYSTOLIC BLOOD PRESSURE: 118 MMHG | WEIGHT: 188.5 LBS | DIASTOLIC BLOOD PRESSURE: 66 MMHG | BODY MASS INDEX: 26.99 KG/M2 | HEART RATE: 68 BPM

## 2019-04-17 DIAGNOSIS — Z12.5 SCREENING FOR PROSTATE CANCER: ICD-10-CM

## 2019-04-17 DIAGNOSIS — E55.9 VITAMIN D DEFICIENCY: ICD-10-CM

## 2019-04-17 DIAGNOSIS — Z51.81 MEDICATION MONITORING ENCOUNTER: ICD-10-CM

## 2019-04-17 DIAGNOSIS — E78.2 MIXED HYPERLIPIDEMIA: ICD-10-CM

## 2019-04-17 DIAGNOSIS — Z00.00 ROUTINE ADULT HEALTH MAINTENANCE: ICD-10-CM

## 2019-04-17 DIAGNOSIS — R79.89 LOW TESTOSTERONE LEVEL IN MALE: Primary | ICD-10-CM

## 2019-04-17 DIAGNOSIS — Z23 NEED FOR VACCINATION FOR PNEUMOCOCCUS: ICD-10-CM

## 2019-04-17 DIAGNOSIS — Z00.00 MEDICARE ANNUAL WELLNESS VISIT, SUBSEQUENT: ICD-10-CM

## 2019-04-17 PROBLEM — Z01.818 PRE-OPERATIVE CLEARANCE: Status: RESOLVED | Noted: 2018-08-21 | Resolved: 2019-04-17

## 2019-04-17 PROBLEM — S42.022D CLOSED DISPLACED FRACTURE OF SHAFT OF LEFT CLAVICLE WITH ROUTINE HEALING: Status: RESOLVED | Noted: 2018-08-21 | Resolved: 2019-04-17

## 2019-04-17 PROBLEM — B35.9 RINGWORM: Status: RESOLVED | Noted: 2018-03-26 | Resolved: 2019-04-17

## 2019-04-17 PROBLEM — S81.812A LACERATION OF LEFT LOWER LEG: Status: RESOLVED | Noted: 2018-08-24 | Resolved: 2019-04-17

## 2019-04-17 PROBLEM — K52.9 GASTROENTERITIS: Status: RESOLVED | Noted: 2017-09-29 | Resolved: 2019-04-17

## 2019-04-17 PROCEDURE — G0439 PPPS, SUBSEQ VISIT: HCPCS | Performed by: FAMILY MEDICINE

## 2019-04-17 PROCEDURE — 90732 PPSV23 VACC 2 YRS+ SUBQ/IM: CPT | Performed by: FAMILY MEDICINE

## 2019-04-17 PROCEDURE — G0009 ADMIN PNEUMOCOCCAL VACCINE: HCPCS | Performed by: FAMILY MEDICINE

## 2019-04-17 RX ORDER — PRAVASTATIN SODIUM 20 MG
20 TABLET ORAL NIGHTLY
Qty: 90 TABLET | Refills: 3 | Status: SHIPPED | OUTPATIENT
Start: 2019-04-17 | End: 2019-09-11 | Stop reason: SDUPTHER

## 2019-04-17 NOTE — PROGRESS NOTES
Subsequent Medicare Wellness Visit   The ABC's of the Annual Wellness Visit    Chief Complaint   Patient presents with   • Annual Exam     Sub Medicare Wellness       HPI:  Perez Bolaños, -1950, is a 69 y.o. male who presents for a Subsequent Medicare Wellness Visit.    Recent Hospitalizations:  No hospitalization(s) within the last year..    Current Medical Providers:  Patient Care Team:  Nav Carmona MD as PCP - General (Family Medicine)  Nav Carmona MD as PCP - Claims Attributed  Stacia, Harvinder Awan MD as Consulting Physician (Gastroenterology)  Tyler Jean MD as Consulting Physician (Orthopedic Surgery)    Health Habits and Functional and Cognitive Screening and Depression Screening:  Functional & Cognitive Status 2019   Do you have difficulty preparing food and eating? No   Do you have difficulty bathing yourself, getting dressed or grooming yourself? No   Do you have difficulty using the toilet? No   Do you have difficulty moving around from place to place? No   Do you have trouble with steps or getting out of a bed or a chair? No   In the past year have you fallen or experienced a near fall? Yes   Current Diet Well Balanced Diet   Dental Exam Up to date   Eye Exam Up to date   Exercise (times per week) 0 times per week   Current Exercise Activities Include None   Do you need help using the phone?  No   Are you deaf or do you have serious difficulty hearing?  Yes   Do you need help with transportation? No   Do you need help shopping? No   Do you need help preparing meals?  No   Do you need help with housework?  No   Do you need help with laundry? No   Do you need help taking your medications? No   Do you need help managing money? No   Do you ever drive or ride in a car without wearing a seat belt? No   Have you felt unusual stress, anger or loneliness in the last month? No   Who do you live with? Spouse   If you need help, do you have trouble finding someone  "available to you? No   Have you been bothered in the last four weeks by sexual problems? No   Do you have difficulty concentrating, remembering or making decisions? No       Compared to one year ago, the patient feels his physical health is better and his mental health is better.    Depression Screen:  PHQ-2/PHQ-9 Depression Screening 4/17/2019   Little interest or pleasure in doing things 0   Feeling down, depressed, or hopeless 0   Trouble falling or staying asleep, or sleeping too much 0   Feeling tired or having little energy 0   Poor appetite or overeating 0   Feeling bad about yourself - or that you are a failure or have let yourself or your family down 0   Trouble concentrating on things, such as reading the newspaper or watching television 0   Moving or speaking so slowly that other people could have noticed. Or the opposite - being so fidgety or restless that you have been moving around a lot more than usual 0   Thoughts that you would be better off dead, or of hurting yourself in some way 0   Total Score 0         Past Medical/Family/Social History:  The following portions of the patient's history were reviewed and updated as appropriate: allergies, current medications, past family history, past medical history, past social history, past surgical history and problem list.    Allergies   Allergen Reactions   • Hydrocodone Other (See Comments)     \"I DO NOT LIKE THE WAY THIS MAKES ME FEEL\"         Current Outpatient Medications:   •  CHLORPHENIRAMINE MALEATE PO, Take 4 mg by mouth As Needed., Disp: , Rfl:   •  Cholecalciferol (VITAMIN D3) 5000 units capsule capsule, Take 5,000 Units by mouth 1 (One) Time Per Week., Disp: , Rfl:   •  CLOMIPHENE CITRATE PO, Take 50 mg by mouth Every Other Day., Disp: , Rfl:   •  magnesium oxide (MAG-OX) 400 MG tablet, Take 250 mg by mouth Daily., Disp: , Rfl:   •  pravastatin (PRAVACHOL) 20 MG tablet, Take 1 tablet by mouth Every Night., Disp: 90 tablet, Rfl: 3  •  Probiotic " Product (PROBIOTIC ADVANCED PO), Take 1 tablet by mouth Every Morning., Disp: , Rfl:   •  tadalafil (CIALIS) 20 MG tablet, Take 20 mg by mouth Daily As Needed for erectile dysfunction., Disp: , Rfl:   •  Zoster Vac Recomb Adjuvanted (SHINGRIX) 50 MCG/0.5ML reconstituted suspension, Inject 50 mcg into the appropriate muscle as directed by prescriber Every 6 (Six) Months for 2 doses., Disp: 1 each, Rfl: 1    Aspirin use counseling: time to stop aspirin    Current medication list contains no high risk medications.  No harmful drug interactions have been identified.     Family History   Problem Relation Age of Onset   • Heart disease Mother    • Alcohol abuse Mother    • Arthritis Sister         Double knee replacement   • Hypertension Sister    • Heart attack Sister    • Heart disease Sister    • Heart attack Brother    • Heart disease Brother    • Malig Hyperthermia Neg Hx        Social History     Tobacco Use   • Smoking status: Never Smoker   • Smokeless tobacco: Never Used   Substance Use Topics   • Alcohol use: Yes     Alcohol/week: 1.2 - 1.8 oz     Types: 2 - 3 Cans of beer per week     Comment: weekend       Past Surgical History:   Procedure Laterality Date   • ABDOMINAL SURGERY      ulcers  CAUTERIZED   • BACK SURGERY     • CARDIAC CATHETERIZATION     • CLAVICLE OPEN REDUCTION INTERNAL FIXATION Left 8/28/2018    Procedure: LT CLAVICLE OPEN REDUCTION INTERNAL FIXATION;  Surgeon: Tyler Jean MD;  Location: [facility] OR [facility] [place];  Service: Orthopedics   • COLON SURGERY  2016    Colonoscopy w/ polyps removal. 5 yr report   • COLONOSCOPY     • COLONOSCOPY N/A 11/4/2016    Procedure: COLONOSCOPY w/ polypectomy;  Surgeon: Harvinder Palomo MD;  Location: [facility] OR;  Service:    • HERNIA REPAIR  2010   • INGUINAL HERNIA REPAIR  2010   • SPINE SURGERY  1993    Lower back DISCECTOMY   • TONSILLECTOMY     • UMBILICAL HERNIA REPAIR     • VASECTOMY  2000       Patient Active Problem List   Diagnosis   • Sleep  "apnea   • MI (mitral incompetence)   • Seasonal allergies   • Mixed hyperlipidemia   • Constipation   • Arthritis   • Low testosterone level in male   • Screening for prostate cancer   • Medication monitoring encounter   • Vitamin D deficiency   • Medicare annual wellness visit, subsequent   • Routine adult health maintenance       Review of Systems    Objective     Vitals:    04/17/19 1022   BP: 118/66   Pulse: 68   SpO2: 98%   Weight: 85.5 kg (188 lb 8 oz)   Height: 177.8 cm (70\")       Patient's Body mass index is 27.05 kg/m². BMI is within normal parameters. No follow-up required..      No exam data present    The patient has no evidence of cognitve impairment.     Physical Exam   Constitutional: He appears well-developed and well-nourished.   Cardiovascular: Normal rate.   Pulmonary/Chest: Effort normal.   Nursing note and vitals reviewed.      Recent Lab Results:  Lab Results   Component Value Date     (H) 04/10/2019     Lab Results   Component Value Date    CHOL 143 04/17/2017    TRIG 70 04/10/2019    HDL 55 04/10/2019    VLDL 14 04/10/2019    LDLDIRECT 113 (H) 04/17/2017    LDLHDL 1.30 03/19/2018       Assessment/Plan   Age-appropriate Screening Schedule:  Refer to the list below for future screening recommendations based on patient's age, sex and/or medical conditions.      Health Maintenance   Topic Date Due   • ZOSTER VACCINE (1 of 2) 04/13/1998   • TDAP/TD VACCINES (1 - Tdap) 08/20/2018   • PNEUMOCOCCAL VACCINES (65+ LOW/MEDIUM RISK) (2 of 2 - PPSV23) 03/26/2019   • INFLUENZA VACCINE  08/01/2019   • LIPID PANEL  04/10/2020   • COLONOSCOPY  11/04/2021       Medicare Risks and Personalized Health Plan:  Immunizations given today; Pneumococcal 23 and Shingrix      CMS-Preventive Services Quick Reference  Medicare Preventive Services Addressed:  Diabetes Screening-Lab Order for either glucose quantitative blood (except reagent strip), glucose;post glucose dose(includes glucose), or glucose tolerance " test-3 specimens(includes glucose)  Pneumococcal Vaccine and Administration  Prostate Cancer Screening     Advance Care Planning:  Patient has an advance directive - a copy has not been provided. Have asked the patient to send this to us to add to record    Diagnoses and all orders for this visit:    1. Low testosterone level in male (Primary)    2. Medicare annual wellness visit, subsequent    3. Medication monitoring encounter    4. Mixed hyperlipidemia    5. Routine adult health maintenance    6. Screening for prostate cancer    7. Need for vaccination for pneumococcus  -     Pneumococcal polysaccharide vaccine 23-valent, adult, subcutaneous/IM    8. Vitamin D deficiency    Other orders  -     Zoster Vac Recomb Adjuvanted (SHINGRIX) 50 MCG/0.5ML reconstituted suspension; Inject 50 mcg into the appropriate muscle as directed by prescriber Every 6 (Six) Months for 2 doses.  Dispense: 1 each; Refill: 1  -     pravastatin (PRAVACHOL) 20 MG tablet; Take 1 tablet by mouth Every Night.  Dispense: 90 tablet; Refill: 3        An After Visit Summary and PPPS with all of these plans were given to the patient.      Follow Up:  Return in about 6 months (around 10/17/2019).

## 2019-09-11 RX ORDER — PRAVASTATIN SODIUM 20 MG
20 TABLET ORAL NIGHTLY
Qty: 90 TABLET | Refills: 3 | Status: SHIPPED | OUTPATIENT
Start: 2019-09-11 | End: 2020-09-08

## 2019-09-24 DIAGNOSIS — E55.9 VITAMIN D DEFICIENCY: Primary | ICD-10-CM

## 2019-09-24 DIAGNOSIS — E78.2 MIXED HYPERLIPIDEMIA: Primary | ICD-10-CM

## 2019-09-24 DIAGNOSIS — Z51.81 MEDICATION MONITORING ENCOUNTER: ICD-10-CM

## 2019-09-25 ENCOUNTER — RESULTS ENCOUNTER (OUTPATIENT)
Dept: FAMILY MEDICINE CLINIC | Facility: CLINIC | Age: 69
End: 2019-09-25

## 2019-09-25 DIAGNOSIS — E55.9 VITAMIN D DEFICIENCY: ICD-10-CM

## 2019-09-25 DIAGNOSIS — Z51.81 MEDICATION MONITORING ENCOUNTER: ICD-10-CM

## 2019-09-25 LAB
APPEARANCE UR: CLEAR
BILIRUB UR QL STRIP: NEGATIVE
CHOLEST SERPL-MCNC: 132 MG/DL (ref 0–200)
COLOR UR: YELLOW
GLUCOSE UR QL: NEGATIVE
HDLC SERPL-MCNC: 53 MG/DL (ref 40–60)
HGB UR QL STRIP: NEGATIVE
KETONES UR QL STRIP: NEGATIVE
LDLC SERPL CALC-MCNC: 66 MG/DL (ref 0–100)
LEUKOCYTE ESTERASE UR QL STRIP: NEGATIVE
NITRITE UR QL STRIP: NEGATIVE
PH UR STRIP: 6.5 [PH] (ref 5–8)
PROT UR QL STRIP: NEGATIVE
PSA SERPL-MCNC: 0.37 NG/ML (ref 0–4)
SP GR UR: 1.02 (ref 1–1.03)
TRIGL SERPL-MCNC: 65 MG/DL (ref 0–150)
UROBILINOGEN UR STRIP-MCNC: NORMAL MG/DL
VLDLC SERPL CALC-MCNC: 13 MG/DL

## 2019-10-02 ENCOUNTER — OFFICE VISIT (OUTPATIENT)
Dept: FAMILY MEDICINE CLINIC | Facility: CLINIC | Age: 69
End: 2019-10-02

## 2019-10-02 VITALS
OXYGEN SATURATION: 98 % | DIASTOLIC BLOOD PRESSURE: 86 MMHG | HEART RATE: 63 BPM | SYSTOLIC BLOOD PRESSURE: 120 MMHG | WEIGHT: 185.9 LBS | HEIGHT: 70 IN | BODY MASS INDEX: 26.61 KG/M2

## 2019-10-02 DIAGNOSIS — R79.89 LOW TESTOSTERONE LEVEL IN MALE: ICD-10-CM

## 2019-10-02 DIAGNOSIS — E55.9 VITAMIN D DEFICIENCY: ICD-10-CM

## 2019-10-02 DIAGNOSIS — K42.9 UMBILICAL HERNIA WITHOUT OBSTRUCTION AND WITHOUT GANGRENE: ICD-10-CM

## 2019-10-02 DIAGNOSIS — E78.2 MIXED HYPERLIPIDEMIA: Primary | ICD-10-CM

## 2019-10-02 DIAGNOSIS — Z12.5 SCREENING FOR PROSTATE CANCER: ICD-10-CM

## 2019-10-02 DIAGNOSIS — Z00.00 ROUTINE ADULT HEALTH MAINTENANCE: ICD-10-CM

## 2019-10-02 DIAGNOSIS — E55.9 VITAMIN D DEFICIENCY, UNSPECIFIED: ICD-10-CM

## 2019-10-02 PROCEDURE — 99213 OFFICE O/P EST LOW 20 MIN: CPT | Performed by: FAMILY MEDICINE

## 2019-10-02 NOTE — PROGRESS NOTES
Subjective   Perez Bolaños is a 69 y.o. male who is here for   Chief Complaint   Patient presents with   • Hyperlipidemia   .     History of Present Illness   Hyperlipidemia: Patient presents with hyperlipidemia.  He was tested because known dz on statin.  His last labs showed Total cholesterol of <200, HDL ,, LDL ,,  Triglycerides ,. negative. There is a family history of hyperlipidemia. There is not a family history of early ischemia heart disease.    Old umbilical hernia is getting bigger  Would like to see a surgeon and discuss repair.    Results for orders placed or performed in visit on 09/24/19   Lipid Panel   Result Value Ref Range    Total Cholesterol 132 0 - 200 mg/dL    Triglycerides 65 0 - 150 mg/dL    HDL Cholesterol 53 40 - 60 mg/dL    VLDL Cholesterol 13 mg/dL    LDL Cholesterol  66 0 - 100 mg/dL   Urinalysis With Microscopic If Indicated (No Culture) -   Result Value Ref Range    Specific Gravity, UA 1.018 1.005 - 1.030    pH, UA 6.5 5.0 - 8.0    Color, UA Yellow     Appearance, UA Clear Clear    Leukocytes, UA Negative Negative    Protein Negative Negative    Glucose, UA Negative Negative    Ketones Negative Negative    Blood, UA Negative Negative    Bilirubin, UA Negative Negative    Urobilinogen, UA Comment     Nitrite, UA Negative Negative   PSA Screen   Result Value Ref Range    PSA 0.367 0.000 - 4.000 ng/mL               The following portions of the patient's history were reviewed and updated as appropriate: allergies, current medications, past family history, past medical history, past social history, past surgical history and problem list.    Review of Systems    Objective   Physical Exam   Constitutional: He appears well-developed and well-nourished.   HENT:   Mouth/Throat: Oropharynx is clear and moist.   Cardiovascular: Normal rate.   Pulmonary/Chest: Effort normal.   Abdominal: Soft. A hernia is present.       Nursing note and vitals reviewed.      Assessment/Plan   Perez was seen  today for hyperlipidemia.    Diagnoses and all orders for this visit:    Mixed hyperlipidemia  -     Comprehensive Metabolic Panel; Future  -     Lipid Panel; Future  -     TSH; Future    Umbilical hernia without obstruction and without gangrene  -     Ambulatory Referral to General Surgery  -     Vitamin D 25 Hydroxy; Future  -     Testosterone, Free, Total; Future    Low testosterone level in male    Vitamin D deficiency    Routine adult health maintenance  -     CBC & Differential; Future  -     Urinalysis With Microscopic If Indicated (No Culture) - Urine, Clean Catch; Future    Screening for prostate cancer  -     PSA Screen; Future    Vitamin D deficiency, unspecified   -     Vitamin D 25 Hydroxy; Future      There are no Patient Instructions on file for this visit.    There are no discontinued medications.     Return in about 6 months (around 4/2/2020) for Medicare Wellness visit.    Dr. Nav Carmona  [facility]  [place], [place]

## 2019-10-15 ENCOUNTER — OFFICE VISIT (OUTPATIENT)
Dept: SURGERY | Facility: CLINIC | Age: 69
End: 2019-10-15

## 2019-10-15 VITALS
HEIGHT: 70 IN | WEIGHT: 188 LBS | BODY MASS INDEX: 26.92 KG/M2 | DIASTOLIC BLOOD PRESSURE: 66 MMHG | SYSTOLIC BLOOD PRESSURE: 126 MMHG | RESPIRATION RATE: 18 BRPM

## 2019-10-15 DIAGNOSIS — K42.9 UMBILICAL HERNIA WITHOUT OBSTRUCTION AND WITHOUT GANGRENE: Primary | ICD-10-CM

## 2019-10-15 PROCEDURE — 99203 OFFICE O/P NEW LOW 30 MIN: CPT | Performed by: SURGERY
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
## 2019-10-24 ENCOUNTER — APPOINTMENT (OUTPATIENT)
Dept: PREADMISSION TESTING | Facility: HOSPITAL | Age: 69
End: 2019-10-24

## 2019-10-24 VITALS
DIASTOLIC BLOOD PRESSURE: 76 MMHG | HEIGHT: 70 IN | BODY MASS INDEX: 27.2 KG/M2 | HEART RATE: 60 BPM | SYSTOLIC BLOOD PRESSURE: 118 MMHG | RESPIRATION RATE: 16 BRPM | OXYGEN SATURATION: 97 % | WEIGHT: 190 LBS

## 2019-10-24 DIAGNOSIS — K42.9 UMBILICAL HERNIA WITHOUT OBSTRUCTION AND WITHOUT GANGRENE: ICD-10-CM

## 2019-10-24 LAB
ANION GAP SERPL CALCULATED.3IONS-SCNC: 8.5 MMOL/L (ref 5–15)
BUN BLD-MCNC: 14 MG/DL (ref 8–23)
BUN/CREAT SERPL: 11.9 (ref 7–25)
CALCIUM SPEC-SCNC: 9.2 MG/DL (ref 8.6–10.5)
CHLORIDE SERPL-SCNC: 104 MMOL/L (ref 98–107)
CO2 SERPL-SCNC: 25.5 MMOL/L (ref 22–29)
CREAT BLD-MCNC: 1.18 MG/DL (ref 0.76–1.27)
DEPRECATED RDW RBC AUTO: 44.4 FL (ref 37–54)
ERYTHROCYTE [DISTWIDTH] IN BLOOD BY AUTOMATED COUNT: 13.1 % (ref 12.3–15.4)
GFR SERPL CREATININE-BSD FRML MDRD: 61 ML/MIN/1.73
GLUCOSE BLD-MCNC: 108 MG/DL (ref 65–99)
HCT VFR BLD AUTO: 44.3 % (ref 37.5–51)
HGB BLD-MCNC: 14.9 G/DL (ref 13–17.7)
MCH RBC QN AUTO: 31.1 PG (ref 26.6–33)
MCHC RBC AUTO-ENTMCNC: 33.6 G/DL (ref 31.5–35.7)
MCV RBC AUTO: 92.5 FL (ref 79–97)
PLATELET # BLD AUTO: 243 10*3/MM3 (ref 140–450)
PMV BLD AUTO: 9.2 FL (ref 6–12)
POTASSIUM BLD-SCNC: 4.7 MMOL/L (ref 3.5–5.2)
RBC # BLD AUTO: 4.79 10*6/MM3 (ref 4.14–5.8)
SODIUM BLD-SCNC: 138 MMOL/L (ref 136–145)
WBC NRBC COR # BLD: 8.32 10*3/MM3 (ref 3.4–10.8)

## 2019-10-24 PROCEDURE — 93010 ELECTROCARDIOGRAM REPORT: CPT | Performed by: INTERNAL MEDICINE

## 2019-10-24 PROCEDURE — 93005 ELECTROCARDIOGRAM TRACING: CPT

## 2019-10-24 PROCEDURE — 85027 COMPLETE CBC AUTOMATED: CPT | Performed by: SURGERY

## 2019-10-24 PROCEDURE — 80048 BASIC METABOLIC PNL TOTAL CA: CPT | Performed by: SURGERY

## 2019-10-24 PROCEDURE — 36415 COLL VENOUS BLD VENIPUNCTURE: CPT

## 2019-10-24 RX ORDER — MULTIPLE VITAMINS W/ MINERALS TAB 9MG-400MCG
1 TAB ORAL DAILY
COMMUNITY

## 2019-10-24 NOTE — PAT
Pt and spouse here for PAT visit.  Pre-op tests completed, chg soap given, and instructions reviewed.  Instructed clears until 5:30 am dos, voiced understanding.

## 2019-10-24 NOTE — DISCHARGE INSTRUCTIONS
PRE-ADMISSION TESTING INSTRUCTIONS FOR ADULTS    Take these medications the morning of surgery with a small sip of water: nothing      No aspirin, advil, aleve, ibuprofen, naproxen, diet pills, decongestants, or herbal/vitamins for a week prior to surgery.    General Instructions:    • Do not eat solid food after midnight the night before surgery.  No gum, mints, or hard candy after midnight the night before surgery.  • You may drink clear liquids the day of surgery up until 2 hours before your arrival time. (until 5:30 am)  • Clear liquids are liquids you can see through. Nothing RED in color.    Plain water    Sports drinks  Sodas     Gelatin (Jell-O)  Fruit juices without pulp such as white grape juice and apple juice  Popsicles that contain no fruit or yogurt  Tea or coffee (no cream or milk added)    • It is beneficial for you to have a clear drink that contains carbohydrates just before you leave your house and before your fasting time begins.  We suggest a 20 ounce bottle of Gatorade or Powerade for non-diabetic patients or a 20 ounce bottle of G2 or Powerade Zero for diabetic patients.  (before 5:30 am)    • Patients who avoid smoking, chewing tobacco and alcohol for 4 weeks prior to surgery have a reduced risk of post-operative complications.  If at all possible, quit smoking as many days before surgery as you can.    • Do not smoke, use chewing tobacco or drink alcohol the day of surgery    • Bring your C-PAP/ BI-PAP machine if you use one.  • Wear clean comfortable clothes and socks.  • Do not wear contact lenses, lotion, deodorant, or make-up.  Bring a case for your glasses if applicable. You may brush your teeth the morning of surgery.  • You may wear dentures/partials, do not put adhesive/glue on them.  • Bring crutches or walker if applicable.  • Leave all other jewelry and valuables at home.      Preventing a Surgical Site Infection:    • Shower the night before and on the morning of surgery using  the chlorhexidine soap you were given.  Use a clean washcloth with the soap.  Place clean sheets on your bed after showering the night before surgery. Do not use the CHG soap on your hair, face, or private areas. Wash your body gently for five (5) minutes. Do not scrub your skin.  Dry with a clean towel and dress in clean clothing.    • Do not shave the surgical area for 10 days-2 weeks prior to surgery  because the razor can irritate skin and make it easier to develop an infection.  • Make sure you, your family, and all healthcare providers clean their hands with soap and water or an alcohol based hand  before caring for you or your wound.      Day of surgery:    Your surgeon’s office will advise you of your arrival time for the day of surgery.    Upon arrival, a Pre-op nurse and Anesthesia provider will review your health history, obtain vital signs, and answer questions you may have.  The only belongings needed at this time will be your home medications and if applicable your C-PAP/BI-PAP machine.  If you are staying overnight your family can leave the rest of your belongings in the car and bring them to your room later.  A Pre-op nurse will start an IV and you may receive medication in preparation for surgery, including something to help you relax.  Your family will be able to see you in the Pre-op area.  While you are in surgery your family should notify the waiting room  if they leave the waiting room area and provide a contact phone number.    IF you have any questions, you can call the Pre-Admission Department at [phone] or your surgeon's office.  Notify your surgeon if  you become sick, have a fever, productive cough, or cannot be here the day of surgery    Please be aware that surgery does come with discomfort.  We want to make every effort to control your discomfort so please discuss any uncontrolled symptoms with your nurse.   Your doctor will most likely have prescribed pain  medications.      If you are going home after surgery, you will receive individualized written care instructions before being discharged.  A responsible adult (over the age of 18) must drive you to and from the hospital on the day of your surgery and stay with you for 24 hours after anesthesia.    If you are staying overnight following surgery, you will be transported to your hospital room following the recovery period.  [facility] has all private rooms.    You may receive a survey regarding the care you received. Your feedback is very important and will be used to collect the necessary data to help us to continue to provide excellent care.     Deductibles and co-payments are collected on the day of service. Please be prepared to pay the required co-pay, deductible or deposit on the day of service as defined by your plan.    Umbilical Herniorrhaphy  Herniorrhaphy is surgery to repair a hernia. A hernia is the protrusion of a part of an organ through an abdominal opening. An umbilical hernia means that your hernia is in the area around your navel. If the hernia is not repaired, the gap could get bigger. Your intestines or other tissues, such as fat, could get trapped in the gap. This can lead to other health problems, such as blocked intestines. If the hernia is fixed before problems set in, you may be allowed to go home the same day as the surgery (outpatient).  LET YOUR HEALTH CARE PROVIDER KNOW ABOUT:  · Allergies to food or medicine.  · Medicines taken, including vitamins, herbs, eye drops, over-the-counter medicines, and creams.  · Use of steroids (by mouth or creams).  · Previous problems with anesthetics or numbing medicines.  · History of bleeding problems or blood clots.  · Previous surgery.  · Other health problems, including diabetes and kidney problems.  · Possibility of pregnancy, if this applies.  RISKS AND COMPLICATIONS  · Pain.  · Excessive bleeding.  · Hematoma. This is a pocket of blood  that collects under the surgery site.  · Infection at the surgery site.  · Numbness at the surgery site.  · Swelling and bruising.  · Blood clots.  · Intestinal damage (rare).  · Scarring.  · Skin damage.  · Development of another hernia. This may require another surgery.  BEFORE THE PROCEDURE  · Ask your health care provider about changing or stopping your regular medicines. You may need to stop taking aspirin, nonsteroidal anti-inflammatory drugs (NSAIDs), vitamin E, and blood thinners as early as 2 weeks before the procedure.  · Do not eat or drink for 8 hours before the procedure, or as directed by your health care provider.  · You might be asked to shower or wash with an antibacterial soap before the procedure.  · Wear comfortable clothes that will be easy to put on after the procedure.  PROCEDURE  You will be given an intravenous (IV) tube. A needle will be inserted in your arm. Medicine will flow directly into your body through this needle. You might be given medicine to help you relax (sedative). You will be given medicine that numbs the area (local anesthetic) or medicine that makes you sleep (general anesthetic).  If you have open surgery:  · The surgeon will make a cut (incision) in your abdomen.  · The gap in the muscle wall will be repaired. The surgeon may sew the edges together over the gap or use a mesh material to strengthen the area. When mesh is used, the body grows new, strong tissue into and around it. This new tissue closes the gap.  · The surgeon will close the incision.  If you have laparoscopic surgery:  · The surgeon will make several small incisions in your abdomen.  · A thin, lighted tube (laparoscope) will be inserted into the abdomen through an incision. A camera is attached to the laparoscope that allows the surgeon to see inside the abdomen.  · Tools will be inserted through the other incisions to repair the hernia. Usually, mesh is used to cover the gap.  · The surgeon will close the  incisions with stitches.  AFTER THE PROCEDURE  · You will be taken to a recovery area. A nurse will watch and check your progress.  · When you are awake, feeling well, and taking fluids well, you may be allowed to go home. In some cases, you may need to stay overnight in the hospital.  · Arrange for someone to drive you home.     This information is not intended to replace advice given to you by your health care provider. Make sure you discuss any questions you have with your health care provider.     Document Released: 03/16/2010 Document Revised: 01/08/2016 Document Reviewed: 03/20/2013  [employer] Interactive Patient Education ©2016 [employer]    Open Hernia Repair, Adult, Care After  These instructions give you information about caring for yourself after your procedure. Your doctor may also give you more specific instructions. If you have problems or questions, contact your doctor.  Follow these instructions at home:  Surgical cut (incision) care    · Follow instructions from your doctor about how to take care of your surgical cut area. Make sure you:  ? Wash your hands with soap and water before you change your bandage (dressing). If you cannot use soap and water, use hand .  ? Change your bandage as told by your doctor.  ? Leave stitches (sutures), skin glue, or skin tape (adhesive) strips in place. They may need to stay in place for 2 weeks or longer. If tape strips get loose and curl up, you may trim the loose edges. Do not remove tape strips completely unless your doctor says it is okay.  · Check your surgical cut every day for signs of infection. Check for:  ? More redness, swelling, or pain.  ? More fluid or blood.  ? Warmth.  ? Pus or a bad smell.  Activity  · Do not drive or use heavy machinery while taking prescription pain medicine. Do not drive until your doctor says it is okay.  · Until your doctor says it is okay:  ? Do not lift anything that is heavier than 10 lb (4.5 kg).  ? Do not play  contact sports.  · Return to your normal activities as told by your doctor. Ask your doctor what activities are safe.  General instructions  · To prevent or treat having a hard time pooping (constipation) while you are taking prescription pain medicine, your doctor may recommend that you:  ? Drink enough fluid to keep your pee (urine) clear or pale yellow.  ? Take over-the-counter or prescription medicines.  ? Eat foods that are high in fiber, such as fresh fruits and vegetables, whole grains, and beans.  ? Limit foods that are high in fat and processed sugars, such as fried and sweet foods.  · Take over-the-counter and prescription medicines only as told by your doctor.  · Do not take baths, swim, or use a hot tub until your doctor says it is okay.  · Keep all follow-up visits as told by your doctor. This is important.  Contact a doctor if:  · You develop a rash.  · You have more redness, swelling, or pain around your surgical cut.  · You have more fluid or blood coming from your surgical cut.  · Your surgical cut feels warm to the touch.  · You have pus or a bad smell coming from your surgical cut.  · You have a fever or chills.  · You have blood in your poop (stool).  · You have not pooped in 2-3 days.  · Medicine does not help your pain.  Get help right away if:  · You have chest pain or you are short of breath.  · You feel light-headed.  · You feel weak and dizzy (feel faint).  · You have very bad pain.  · You throw up (vomit) and your pain is worse.  This information is not intended to replace advice given to you by your health care provider. Make sure you discuss any questions you have with your health care provider.  Document Released: 01/08/2016 Document Revised: 07/07/2017 Document Reviewed: 05/31/2017  [employer] Interactive Patient Education © 2019 [employer]

## 2019-10-25 ENCOUNTER — APPOINTMENT (OUTPATIENT)
Dept: PREADMISSION TESTING | Facility: HOSPITAL | Age: 69
End: 2019-10-25

## 2019-11-05 ENCOUNTER — ANESTHESIA EVENT (OUTPATIENT)
Dept: PERIOP | Facility: HOSPITAL | Age: 69
End: 2019-11-05

## 2019-11-06 ENCOUNTER — HOSPITAL ENCOUNTER (OUTPATIENT)
Facility: HOSPITAL | Age: 69
Setting detail: HOSPITAL OUTPATIENT SURGERY
Discharge: HOME | End: 2019-11-06
Attending: SURGERY | Admitting: SURGERY

## 2019-11-06 ENCOUNTER — ANESTHESIA (OUTPATIENT)
Dept: PERIOP | Facility: HOSPITAL | Age: 69
End: 2019-11-06

## 2019-11-06 VITALS
BODY MASS INDEX: 26.37 KG/M2 | TEMPERATURE: 97.9 F | SYSTOLIC BLOOD PRESSURE: 127 MMHG | RESPIRATION RATE: 16 BRPM | HEART RATE: 64 BPM | OXYGEN SATURATION: 97 % | DIASTOLIC BLOOD PRESSURE: 79 MMHG | WEIGHT: 183.8 LBS

## 2019-11-06 DIAGNOSIS — K42.9 UMBILICAL HERNIA WITHOUT OBSTRUCTION AND WITHOUT GANGRENE: ICD-10-CM

## 2019-11-06 PROCEDURE — 49585 PR REPAIR UMBILICAL HERN,5+Y/O,REDUC: CPT | Performed by: SURGERY

## 2019-11-06 PROCEDURE — 25010000002 FENTANYL CITRATE (PF) 100 MCG/2ML SOLUTION: Performed by: NURSE ANESTHETIST, CERTIFIED REGISTERED

## 2019-11-06 PROCEDURE — 25010000002 DEXAMETHASONE PER 1 MG: Performed by: NURSE ANESTHETIST, CERTIFIED REGISTERED

## 2019-11-06 PROCEDURE — 25010000002 KETOROLAC TROMETHAMINE PER 15 MG: Performed by: NURSE ANESTHETIST, CERTIFIED REGISTERED

## 2019-11-06 PROCEDURE — 25010000003 CEFAZOLIN SODIUM-DEXTROSE 2-3 GM-%(50ML) RECONSTITUTED SOLUTION: Performed by: SURGERY

## 2019-11-06 PROCEDURE — 25010000002 ONDANSETRON PER 1 MG: Performed by: NURSE ANESTHETIST, CERTIFIED REGISTERED

## 2019-11-06 PROCEDURE — 25010000002 MIDAZOLAM PER 1MG: Performed by: NURSE ANESTHETIST, CERTIFIED REGISTERED

## 2019-11-06 RX ORDER — HYDROMORPHONE HYDROCHLORIDE 1 MG/ML
1 INJECTION, SOLUTION INTRAMUSCULAR; INTRAVENOUS; SUBCUTANEOUS
Status: DISCONTINUED | OUTPATIENT
Start: 2019-11-06 | End: 2019-11-06 | Stop reason: HOSPADM

## 2019-11-06 RX ORDER — SODIUM CHLORIDE 0.9 % (FLUSH) 0.9 %
3 SYRINGE (ML) INJECTION EVERY 12 HOURS SCHEDULED
Status: DISCONTINUED | OUTPATIENT
Start: 2019-11-06 | End: 2019-11-06 | Stop reason: HOSPADM

## 2019-11-06 RX ORDER — MIDAZOLAM HYDROCHLORIDE 1 MG/ML
1 INJECTION INTRAMUSCULAR; INTRAVENOUS
Status: DISCONTINUED | OUTPATIENT
Start: 2019-11-06 | End: 2019-11-06 | Stop reason: HOSPADM

## 2019-11-06 RX ORDER — MIDAZOLAM HYDROCHLORIDE 1 MG/ML
2 INJECTION INTRAMUSCULAR; INTRAVENOUS
Status: DISCONTINUED | OUTPATIENT
Start: 2019-11-06 | End: 2019-11-06 | Stop reason: HOSPADM

## 2019-11-06 RX ORDER — SODIUM CHLORIDE, SODIUM LACTATE, POTASSIUM CHLORIDE, CALCIUM CHLORIDE 600; 310; 30; 20 MG/100ML; MG/100ML; MG/100ML; MG/100ML
9 INJECTION, SOLUTION INTRAVENOUS CONTINUOUS
Status: DISCONTINUED | OUTPATIENT
Start: 2019-11-06 | End: 2019-11-06 | Stop reason: HOSPADM

## 2019-11-06 RX ORDER — SODIUM CHLORIDE, SODIUM LACTATE, POTASSIUM CHLORIDE, CALCIUM CHLORIDE 600; 310; 30; 20 MG/100ML; MG/100ML; MG/100ML; MG/100ML
100 INJECTION, SOLUTION INTRAVENOUS CONTINUOUS
Status: DISCONTINUED | OUTPATIENT
Start: 2019-11-06 | End: 2019-11-06 | Stop reason: HOSPADM

## 2019-11-06 RX ORDER — LIDOCAINE HYDROCHLORIDE 10 MG/ML
0.5 INJECTION, SOLUTION EPIDURAL; INFILTRATION; INTRACAUDAL; PERINEURAL ONCE AS NEEDED
Status: COMPLETED | OUTPATIENT
Start: 2019-11-06 | End: 2019-11-06

## 2019-11-06 RX ORDER — OXYCODONE HYDROCHLORIDE AND ACETAMINOPHEN 5; 325 MG/1; MG/1
1 TABLET ORAL EVERY 4 HOURS PRN
Qty: 30 TABLET | Refills: 0 | Status: SHIPPED | OUTPATIENT
Start: 2019-11-06 | End: 2021-03-26 | Stop reason: ALTCHOICE

## 2019-11-06 RX ORDER — SODIUM CHLORIDE 0.9 % (FLUSH) 0.9 %
1-10 SYRINGE (ML) INJECTION AS NEEDED
Status: DISCONTINUED | OUTPATIENT
Start: 2019-11-06 | End: 2019-11-06 | Stop reason: HOSPADM

## 2019-11-06 RX ORDER — ONDANSETRON 2 MG/ML
4 INJECTION INTRAMUSCULAR; INTRAVENOUS ONCE AS NEEDED
Status: DISCONTINUED | OUTPATIENT
Start: 2019-11-06 | End: 2019-11-06 | Stop reason: HOSPADM

## 2019-11-06 RX ORDER — DEXAMETHASONE SODIUM PHOSPHATE 4 MG/ML
2 INJECTION, SOLUTION INTRA-ARTICULAR; INTRALESIONAL; INTRAMUSCULAR; INTRAVENOUS; SOFT TISSUE ONCE
Status: DISCONTINUED | OUTPATIENT
Start: 2019-11-06 | End: 2019-11-06

## 2019-11-06 RX ORDER — BUPIVACAINE HYDROCHLORIDE 2.5 MG/ML
INJECTION, SOLUTION EPIDURAL; INFILTRATION; INTRACAUDAL AS NEEDED
Status: DISCONTINUED | OUTPATIENT
Start: 2019-11-06 | End: 2019-11-06 | Stop reason: HOSPADM

## 2019-11-06 RX ORDER — LIDOCAINE HYDROCHLORIDE 10 MG/ML
0.5 INJECTION, SOLUTION EPIDURAL; INFILTRATION; INTRACAUDAL; PERINEURAL ONCE AS NEEDED
Status: DISCONTINUED | OUTPATIENT
Start: 2019-11-06 | End: 2019-11-06 | Stop reason: HOSPADM

## 2019-11-06 RX ORDER — ONDANSETRON 2 MG/ML
4 INJECTION INTRAMUSCULAR; INTRAVENOUS ONCE AS NEEDED
Status: COMPLETED | OUTPATIENT
Start: 2019-11-06 | End: 2019-11-06

## 2019-11-06 RX ORDER — DEXAMETHASONE SODIUM PHOSPHATE 4 MG/ML
8 INJECTION, SOLUTION INTRA-ARTICULAR; INTRALESIONAL; INTRAMUSCULAR; INTRAVENOUS; SOFT TISSUE ONCE
Status: COMPLETED | OUTPATIENT
Start: 2019-11-06 | End: 2019-11-06

## 2019-11-06 RX ORDER — FENTANYL CITRATE 50 UG/ML
INJECTION, SOLUTION INTRAMUSCULAR; INTRAVENOUS AS NEEDED
Status: DISCONTINUED | OUTPATIENT
Start: 2019-11-06 | End: 2019-11-06 | Stop reason: SURG

## 2019-11-06 RX ORDER — MAGNESIUM HYDROXIDE 1200 MG/15ML
LIQUID ORAL AS NEEDED
Status: DISCONTINUED | OUTPATIENT
Start: 2019-11-06 | End: 2019-11-06 | Stop reason: HOSPADM

## 2019-11-06 RX ORDER — SODIUM CHLORIDE 9 MG/ML
40 INJECTION, SOLUTION INTRAVENOUS AS NEEDED
Status: DISCONTINUED | OUTPATIENT
Start: 2019-11-06 | End: 2019-11-06 | Stop reason: HOSPADM

## 2019-11-06 RX ORDER — CEFAZOLIN SODIUM 2 G/50ML
2 SOLUTION INTRAVENOUS ONCE
Status: COMPLETED | OUTPATIENT
Start: 2019-11-06 | End: 2019-11-06

## 2019-11-06 RX ORDER — KETOROLAC TROMETHAMINE 30 MG/ML
INJECTION, SOLUTION INTRAMUSCULAR; INTRAVENOUS AS NEEDED
Status: DISCONTINUED | OUTPATIENT
Start: 2019-11-06 | End: 2019-11-06 | Stop reason: SURG

## 2019-11-06 RX ADMIN — FAMOTIDINE 20 MG: 10 INJECTION INTRAVENOUS at 09:07

## 2019-11-06 RX ADMIN — CEFAZOLIN SODIUM 2 G: 2 SOLUTION INTRAVENOUS at 09:52

## 2019-11-06 RX ADMIN — SODIUM CHLORIDE, POTASSIUM CHLORIDE, SODIUM LACTATE AND CALCIUM CHLORIDE: 600; 310; 30; 20 INJECTION, SOLUTION INTRAVENOUS at 08:59

## 2019-11-06 RX ADMIN — MIDAZOLAM HYDROCHLORIDE 1 MG: 1 INJECTION, SOLUTION INTRAMUSCULAR; INTRAVENOUS at 09:36

## 2019-11-06 RX ADMIN — FENTANYL CITRATE 25 MCG: 50 INJECTION, SOLUTION INTRAMUSCULAR; INTRAVENOUS at 10:12

## 2019-11-06 RX ADMIN — DEXAMETHASONE SODIUM PHOSPHATE 8 MG: 4 INJECTION, SOLUTION INTRAMUSCULAR; INTRAVENOUS at 09:08

## 2019-11-06 RX ADMIN — FENTANYL CITRATE 50 MCG: 50 INJECTION, SOLUTION INTRAMUSCULAR; INTRAVENOUS at 09:55

## 2019-11-06 RX ADMIN — SODIUM CHLORIDE, POTASSIUM CHLORIDE, SODIUM LACTATE AND CALCIUM CHLORIDE 9 ML/HR: 600; 310; 30; 20 INJECTION, SOLUTION INTRAVENOUS at 08:30

## 2019-11-06 RX ADMIN — MIDAZOLAM HYDROCHLORIDE 1 MG: 1 INJECTION, SOLUTION INTRAMUSCULAR; INTRAVENOUS at 09:08

## 2019-11-06 RX ADMIN — KETOROLAC TROMETHAMINE 30 MG: 30 INJECTION INTRAMUSCULAR; INTRAVENOUS at 10:16

## 2019-11-06 RX ADMIN — ONDANSETRON 4 MG: 2 INJECTION, SOLUTION INTRAMUSCULAR; INTRAVENOUS at 09:08

## 2019-11-06 RX ADMIN — LIDOCAINE HYDROCHLORIDE 0.5 ML: 10 INJECTION, SOLUTION EPIDURAL; INFILTRATION; INTRACAUDAL; PERINEURAL at 08:30

## 2019-11-06 NOTE — ANESTHESIA POSTPROCEDURE EVALUATION
Patient: Perez Bolaños    Procedure Summary     Date:  11/06/19 Room / Location:   [facility] /  [facility]    Anesthesia Start:  0949 Anesthesia Stop:  1029    Procedure:  UMBILICAL HERNIA REPAIR (N/A Abdomen) Diagnosis:       Umbilical hernia without obstruction and without gangrene      (Umbilical hernia without obstruction and without gangrene [K42.9])    Surgeon:  Juan Carlos Castanon MD Provider:  Monalisa Call CRNA    Anesthesia Type:  general ASA Status:  2          Anesthesia Type: general  Last vitals  BP   152/92 (11/06/19 1106)   Temp   97.9 °F (36.6 °C) (11/06/19 1106)   Pulse   68 (11/06/19 1106)   Resp   16 (11/06/19 1106)     SpO2   97 % (11/06/19 1106)     Post Anesthesia Care and Evaluation    Patient location during evaluation: PHASE II  Patient participation: complete - patient participated  Level of consciousness: awake and alert  Pain score: 0  Pain management: adequate  Airway patency: patent  Anesthetic complications: No anesthetic complications  PONV Status: none  Cardiovascular status: acceptable  Respiratory status: acceptable  Hydration status: acceptable

## 2019-11-06 NOTE — OP NOTE
Preoperative diagnosis umbilical hernia  Postoperative diagnosis same  Procedure open repair of umbilical hernia  Complications none  Drains none  Specimen none  Estimated blood loss less than 5 cc  Anesthesia General via LMA  Surgeon Dr. Castanon  Assistant none  Findings 1.1 cm fascial umbilical defect  Procedure after satisfactory induction of general anesthesia via LMA the patient's abdomen was prepped and draped in sterile fashion.  Curvilinear infraumbilical skin incision was made carried down through the skin and subcutaneous tissue.  Umbilicus was controlled circumferentially in the subcutaneous tissue with a Aliza clamp.  Sac was divided along its base.  Fascia was closed in interrupted simple fashion with use of 0 Ethibond placing all sutures and tying at completion.  Umbilicus was tacked down to the fascia with an interrupted 3-0 Vicryl simple suture.  Subcutaneous tissues closed in interrupted simple fashion with use of 3-0 Vicryl.  Skin was closed with 4-0 Monocryl running subcuticular stitch burying the knots.  Skin and subcutaneous tissue was locally infiltrated quarter percent Marcaine without epinephrine a total of 10 cc was used.  Wound was cleaned and dried and sterilely dressed.  The patient tolerated the procedure well was transferred to the recovery room in stable condition.  When he is awake alert stable tolerating p.o. and has voided he will be discharged home to follow-up in my office next week call for problems.  Diet as tolerated.  No lifting more than 5 pounds x 6 weeks.  He was written a prescription for Percocet 5/325 1 p.o. every 4 hours as needed pain 30 these were dispensed without refills.  He should call for problems and call for an appointment.

## 2019-11-06 NOTE — ANESTHESIA PREPROCEDURE EVALUATION
Anesthesia Evaluation     Patient summary reviewed and Nursing notes reviewed   no history of anesthetic complications:  NPO Solid Status: > 8 hours  NPO Liquid Status: > 2 hours           Airway   Mallampati: II  TM distance: >3 FB  Neck ROM: full  No difficulty expected  Dental      Pulmonary     breath sounds clear to auscultation  (+) sleep apnea (does not use cpap),   Cardiovascular   Exercise tolerance: good (4-7 METS)    ECG reviewed  PT is on anticoagulation therapy  Rhythm: regular  Rate: normal    (+) valvular problems/murmurs (MI), past MI (7-8yrs ago pt states he had a cath and did not need a stent) , CAD, hyperlipidemia,     ROS comment: Narrative     HEART RATE= 55  bpm  RR Interval= 1084  ms  MD Interval= 214  ms  P Horizontal Axis= 23  deg  P Front Axis= 0  deg  QRSD Interval= 102  ms  QT Interval= 409  ms  QRS Axis= -5  deg  T Wave Axis= 29  deg  - BORDERLINE ECG -  Sinus rhythm  Borderline prolonged MD interval  NO PRIOR TRACING AVAILABLE FOR COMPARISON  Electronically Signed By: Sarina Bravo ([facility]) 25-Oct-2019 19:16:01  Date and Time of Study: 2019-10-24 09:24:33        Neuro/Psych- negative ROS  GI/Hepatic/Renal/Endo    (+)  PUD, GI bleeding upper resolved,     Musculoskeletal     (+) back pain (LB surgery disc),   Abdominal    Substance History   (+) alcohol use (weekends),      OB/GYN          Other   arthritis,    history of cancer (skin pre-cancer removed)    ROS/Med Hx Other: Cl stopped at 7am                  Anesthesia Plan    ASA 2     general     intravenous induction     Anesthetic plan, all risks, benefits, and alternatives have been provided, discussed and informed consent has been obtained with: patient.  Use of blood products discussed with patient  Consented to blood products.

## 2019-11-06 NOTE — ANESTHESIA PROCEDURE NOTES
Airway  Urgency: elective    Date/Time: 11/6/2019 9:57 AM  Airway not difficult    General Information and Staff    Patient location during procedure: OR  CRNA: Monalisa Call CRNA    Indications and Patient Condition  Indications for airway management: airway protection    Preoxygenated: yes  MILS maintained throughout  Mask difficulty assessment: 1 - vent by mask    Final Airway Details  Final airway type: supraglottic airway      Successful airway: unique  Size 5    Number of attempts at approach: 1  Assessment: seated easily, atraumatic placement and lips, teeth, and gum same as pre-op

## 2019-11-06 NOTE — INTERVAL H&P NOTE
H&P updated. The patient was examined and the following changes are noted:  vs  /81 (BP Location: Right arm, Patient Position: Lying)   Pulse 63   Temp 98.4 °F (36.9 °C) (Oral)   Resp 16   Wt 83.4 kg (183 lb 12.8 oz)   SpO2 97%   BMI 26.37 kg/m²

## 2019-11-13 ENCOUNTER — OFFICE VISIT (OUTPATIENT)
Dept: SURGERY | Facility: CLINIC | Age: 69
End: 2019-11-13

## 2019-11-13 DIAGNOSIS — Z09 SURGICAL FOLLOW-UP CARE: Primary | ICD-10-CM

## 2019-11-13 PROCEDURE — 99024 POSTOP FOLLOW-UP VISIT: CPT | Performed by: SURGERY

## 2019-11-13 NOTE — PROGRESS NOTES
1 wk PO hernia repair - no problems at this time  He is without complaints.  His incision is healing well there is mild ecchymosis there is no impulse.  Activity level was discussed I will see him back in 1 month

## 2019-12-11 ENCOUNTER — OFFICE VISIT (OUTPATIENT)
Dept: SURGERY | Facility: CLINIC | Age: 69
End: 2019-12-11

## 2019-12-11 DIAGNOSIS — Z09 SURGICAL FOLLOW-UP CARE: Primary | ICD-10-CM

## 2019-12-11 PROCEDURE — 99024 POSTOP FOLLOW-UP VISIT: CPT | Performed by: SURGERY

## 2019-12-11 NOTE — PROGRESS NOTES
5 weeks s/p umbilical hernia repair. No complaints.  He is without complaints.  His incision is healing well.  There is no impulse.  Activity level was discussed I will see him back as needed

## 2020-04-14 ENCOUNTER — RESULTS ENCOUNTER (OUTPATIENT)
Dept: FAMILY MEDICINE CLINIC | Facility: CLINIC | Age: 70
End: 2020-04-14

## 2020-04-14 DIAGNOSIS — Z12.5 SCREENING FOR PROSTATE CANCER: ICD-10-CM

## 2020-04-14 DIAGNOSIS — E55.9 VITAMIN D DEFICIENCY, UNSPECIFIED: ICD-10-CM

## 2020-04-14 DIAGNOSIS — E78.2 MIXED HYPERLIPIDEMIA: ICD-10-CM

## 2020-04-14 DIAGNOSIS — Z00.00 ROUTINE ADULT HEALTH MAINTENANCE: ICD-10-CM

## 2020-04-14 DIAGNOSIS — K42.9 UMBILICAL HERNIA WITHOUT OBSTRUCTION AND WITHOUT GANGRENE: ICD-10-CM

## 2020-06-16 LAB
25(OH)D3+25(OH)D2 SERPL-MCNC: 59.4 NG/ML (ref 30–100)
ALBUMIN SERPL-MCNC: 4.4 G/DL (ref 3.7–4.7)
ALBUMIN/GLOB SERPL: 2.1 {RATIO} (ref 1.2–2.2)
ALP SERPL-CCNC: 54 IU/L (ref 39–117)
ALT SERPL-CCNC: 22 IU/L (ref 0–44)
APPEARANCE UR: CLEAR
AST SERPL-CCNC: 26 IU/L (ref 0–40)
BASOPHILS # BLD AUTO: 0.1 X10E3/UL (ref 0–0.2)
BASOPHILS NFR BLD AUTO: 1 %
BILIRUB SERPL-MCNC: 0.4 MG/DL (ref 0–1.2)
BILIRUB UR QL STRIP: NEGATIVE
BUN SERPL-MCNC: 16 MG/DL (ref 8–27)
BUN/CREAT SERPL: 14 (ref 10–24)
CALCIUM SERPL-MCNC: 9.1 MG/DL (ref 8.6–10.2)
CHLORIDE SERPL-SCNC: 102 MMOL/L (ref 96–106)
CHOLEST SERPL-MCNC: 139 MG/DL (ref 100–199)
CO2 SERPL-SCNC: 25 MMOL/L (ref 20–29)
COLOR UR: YELLOW
CREAT SERPL-MCNC: 1.17 MG/DL (ref 0.76–1.27)
EOSINOPHIL # BLD AUTO: 0.3 X10E3/UL (ref 0–0.4)
EOSINOPHIL NFR BLD AUTO: 3 %
ERYTHROCYTE [DISTWIDTH] IN BLOOD BY AUTOMATED COUNT: 12.4 % (ref 11.6–15.4)
GLOBULIN SER CALC-MCNC: 2.1 G/DL (ref 1.5–4.5)
GLUCOSE SERPL-MCNC: 105 MG/DL (ref 65–99)
GLUCOSE UR QL: NEGATIVE
HCT VFR BLD AUTO: 43.9 % (ref 37.5–51)
HDLC SERPL-MCNC: 57 MG/DL
HGB BLD-MCNC: 14.4 G/DL (ref 13–17.7)
HGB UR QL STRIP: NEGATIVE
IMM GRANULOCYTES # BLD AUTO: 0 X10E3/UL (ref 0–0.1)
IMM GRANULOCYTES NFR BLD AUTO: 0 %
KETONES UR QL STRIP: NEGATIVE
LDLC SERPL CALC-MCNC: 68 MG/DL (ref 0–99)
LEUKOCYTE ESTERASE UR QL STRIP: NEGATIVE
LYMPHOCYTES # BLD AUTO: 1.9 X10E3/UL (ref 0.7–3.1)
LYMPHOCYTES NFR BLD AUTO: 23 %
MCH RBC QN AUTO: 31 PG (ref 26.6–33)
MCHC RBC AUTO-ENTMCNC: 32.8 G/DL (ref 31.5–35.7)
MCV RBC AUTO: 95 FL (ref 79–97)
MICRO URNS: NORMAL
MONOCYTES # BLD AUTO: 0.8 X10E3/UL (ref 0.1–0.9)
MONOCYTES NFR BLD AUTO: 9 %
NEUTROPHILS # BLD AUTO: 5.4 X10E3/UL (ref 1.4–7)
NEUTROPHILS NFR BLD AUTO: 64 %
NITRITE UR QL STRIP: NEGATIVE
PH UR STRIP: 6 [PH] (ref 5–7.5)
PLATELET # BLD AUTO: 228 X10E3/UL (ref 150–450)
POTASSIUM SERPL-SCNC: 4.4 MMOL/L (ref 3.5–5.2)
PROT SERPL-MCNC: 6.5 G/DL (ref 6–8.5)
PROT UR QL STRIP: NEGATIVE
PSA SERPL-MCNC: 0.4 NG/ML (ref 0–4)
RBC # BLD AUTO: 4.64 X10E6/UL (ref 4.14–5.8)
SODIUM SERPL-SCNC: 141 MMOL/L (ref 134–144)
SP GR UR: 1.02 (ref 1–1.03)
TESTOST FREE SERPL-MCNC: 9.1 PG/ML (ref 6.6–18.1)
TESTOST SERPL-MCNC: 630 NG/DL (ref 264–916)
TRIGL SERPL-MCNC: 69 MG/DL (ref 0–149)
TSH SERPL DL<=0.005 MIU/L-ACNC: 1.27 UIU/ML (ref 0.45–4.5)
UROBILINOGEN UR STRIP-MCNC: 0.2 MG/DL (ref 0.2–1)
VLDLC SERPL CALC-MCNC: 14 MG/DL (ref 5–40)
WBC # BLD AUTO: 8.4 X10E3/UL (ref 3.4–10.8)

## 2020-06-26 ENCOUNTER — TELEMEDICINE (OUTPATIENT)
Dept: FAMILY MEDICINE CLINIC | Facility: CLINIC | Age: 70
End: 2020-06-26

## 2020-06-26 DIAGNOSIS — Z00.00 ROUTINE ADULT HEALTH MAINTENANCE: ICD-10-CM

## 2020-06-26 DIAGNOSIS — G47.01 INSOMNIA DUE TO MEDICAL CONDITION: ICD-10-CM

## 2020-06-26 DIAGNOSIS — R79.89 LOW TESTOSTERONE LEVEL IN MALE: Primary | ICD-10-CM

## 2020-06-26 DIAGNOSIS — E78.2 MIXED HYPERLIPIDEMIA: ICD-10-CM

## 2020-06-26 DIAGNOSIS — R79.89 OTHER SPECIFIED ABNORMAL FINDINGS OF BLOOD CHEMISTRY: ICD-10-CM

## 2020-06-26 DIAGNOSIS — E55.9 VITAMIN D DEFICIENCY: ICD-10-CM

## 2020-06-26 PROCEDURE — 99213 OFFICE O/P EST LOW 20 MIN: CPT | Performed by: FAMILY MEDICINE

## 2020-06-26 RX ORDER — SILDENAFIL CITRATE 20 MG/1
20 TABLET ORAL DAILY PRN
Qty: 30 TABLET | Refills: 5 | Status: SHIPPED | OUTPATIENT
Start: 2020-06-26

## 2020-06-26 NOTE — PROGRESS NOTES
Subjective   Perez Bolaños is a 70 y.o. male who is here for No chief complaint on file.  .     History of Present Illness   This is a Mychart Video medical encounter, occurring during the coronavirus COVID-19 pandemic of 2020.  Medical history from chart is reviewed.  Total face video time:   12 . Total chart time:15    Bp: 121/69; pulse 66  132/83 pulse 56.  T 97.7  Weight: 178lb.    Doing well at home  Labs all ok  requesting new ED  Med.          The following portions of the patient's history were reviewed and updated as appropriate: allergies, current medications, past family history, past medical history, past social history, past surgical history and problem list.    Review of Systems   Constitutional: Negative for fatigue and fever.   HENT: Negative.    Respiratory: Negative.    Cardiovascular: Negative.    Gastrointestinal: Negative.    Genitourinary: Negative for difficulty urinating.   Psychiatric/Behavioral: Negative.        Objective   Physical Exam   Constitutional: He appears well-developed and well-nourished. No distress.   Pulmonary/Chest: No respiratory distress.   Neurological: He is alert.   Vitals reviewed.      Assessment/Plan   Diagnoses and all orders for this visit:    Low testosterone level in male  -     sildenafil (REVATIO) 20 MG tablet; Take 1 tablet by mouth Daily As Needed (ED).  -     Testosterone, Free, Total; Future    Routine adult health maintenance  -     CBC (No Diff); Future  -     Comprehensive Metabolic Panel; Future  -     Lipid Panel; Future  -     TSH; Future  -     Urinalysis With Microscopic If Indicated (No Culture) - Urine, Clean Catch; Future  -     Testosterone, Free, Total; Future  -     Vitamin D 25 Hydroxy; Future    Mixed hyperlipidemia    Vitamin D deficiency  -     Lipid Panel; Future  -     TSH; Future  -     Vitamin D 25 Hydroxy; Future    Other specified abnormal findings of blood chemistry   -     Lipid Panel; Future    Insomnia due to medical  condition   -     TSH; Future      There are no Patient Instructions on file for this visit.    Medications Discontinued During This Encounter   Medication Reason   • tadalafil (CIALIS) 20 MG tablet *Therapy completed        No follow-ups on file.    Dr. Nav Carmona  [place], [place]

## 2020-09-08 RX ORDER — PRAVASTATIN SODIUM 20 MG
20 TABLET ORAL NIGHTLY
Qty: 90 TABLET | Refills: 1 | Status: SHIPPED | OUTPATIENT
Start: 2020-09-08 | End: 2021-08-09 | Stop reason: SDUPTHER

## 2020-12-22 ENCOUNTER — TELEMEDICINE (OUTPATIENT)
Dept: FAMILY MEDICINE CLINIC | Facility: CLINIC | Age: 70
End: 2020-12-22

## 2020-12-22 DIAGNOSIS — J06.9 ACUTE URI: Primary | ICD-10-CM

## 2020-12-22 PROCEDURE — 99213 OFFICE O/P EST LOW 20 MIN: CPT | Performed by: FAMILY MEDICINE

## 2020-12-22 RX ORDER — AMOXICILLIN 500 MG/1
500 CAPSULE ORAL 3 TIMES DAILY
Qty: 21 CAPSULE | Refills: 0 | Status: SHIPPED | OUTPATIENT
Start: 2020-12-22 | End: 2021-03-26

## 2020-12-22 NOTE — PROGRESS NOTES
Subjective   Perez Bolaños is a 70 y.o. male who is here for   Chief Complaint   Patient presents with   • URI   .     History of Present Illness   This is a Mychart Video medical encounter, occurring during the coronavirus COVID-19 pandemic of 2020-1.  Medical history from chart is reviewed. Audio visual encounter provided through a secure link via Zoom. Patient location is per their choice. Provider location is in my routine medical office in [facility]. Patient has given prior consent for this encounter, via check in process. Regarding EM coding: history will not be as robust and exam will be limited. Most EM coding decisions will be based on MDM and time.  Total face video time: 15 . Total chart time pre and post charting:    Bill calls in today with runny nose.  Has had a for a week or 2.  He is known to have seasonal allergies so not out of the ordinary.  But he has some concern due to COVID-19.  He was done in [place] visiting his nephew.  2 weeks after he came back from visiting his nephew the nephew and his wife came down with COVID-19.  But bills not having any other symptoms besides a runny nose denies any headaches fever body aches loss of smell or taste or cough.        The following portions of the patient's history were reviewed and updated as appropriate: allergies, current medications, past family history, past medical history, past social history, past surgical history and problem list.    Review of Systems    Objective   Physical Exam  Pulmonary:      Effort: No respiratory distress.   Neurological:      Mental Status: He is alert.         Assessment/Plan   Diagnoses and all orders for this visit:    1. Acute URI (Primary)  -     amoxicillin (AMOXIL) 500 MG capsule; Take 1 capsule by mouth 3 (Three) Times a Day.  Dispense: 21 capsule; Refill: 0    At this point we will just treat as an exacerbation of his chronic sinus issues with amoxicillin.  But if he has more symptoms he can call  tomorrow and we will order a COVID-19 swab for him.      There are no Patient Instructions on file for this visit.    There are no discontinued medications.     No follow-ups on file.    Dr. Nav Carmona  [place], [place]

## 2021-02-15 ENCOUNTER — TELEPHONE (OUTPATIENT)
Dept: FAMILY MEDICINE CLINIC | Facility: CLINIC | Age: 71
End: 2021-02-15

## 2021-02-15 NOTE — TELEPHONE ENCOUNTER
DELETE AFTER REVIEWING: Telephone encounter to be sent to the clinical pool     Caller: Wanda Bolaños    Relationship to patient: Emergency Contact    Best call back number: [phone]    Chief complaint: NEED LAB APPT RESCHEDULED    Type of visit: LAB    Requested date: RESCHEDULED HIS FOLLOW UP ON 3/19/21    If rescheduling, when is the original appointment: LAB APPT WAS SCHEDULED 2/19/21    Additional notes: PT PREFERS MARCH 8, 9, 10   HUB ATTEMPTED WARM TRANSFER

## 2021-03-26 ENCOUNTER — TELEMEDICINE (OUTPATIENT)
Dept: FAMILY MEDICINE CLINIC | Facility: CLINIC | Age: 71
End: 2021-03-26

## 2021-03-26 DIAGNOSIS — E78.2 MIXED HYPERLIPIDEMIA: Primary | ICD-10-CM

## 2021-03-26 PROCEDURE — 99213 OFFICE O/P EST LOW 20 MIN: CPT | Performed by: FAMILY MEDICINE

## 2021-03-26 NOTE — PROGRESS NOTES
Subjective   Perez Bolaños is a 70 y.o. male who is here for   Chief Complaint   Patient presents with   • Hyperlipidemia   .     History of Present Illness   This is a Mychart Video medical encounter, occurring during the coronavirus COVID-19 pandemic of 2020.  Medical history from chart is reviewed. Audio visual encounter provided through a secure link via Zoom. Patient location is per their choice. Provider location is in my routine medical office in [facility]. Patient has given prior consent for this encounter, via check in process. Regarding EM coding: history will not be as robust and exam will be limited. Most EM coding decisions will be based on MDM and time.  Total face video time, 15 minutes . Total chart time pre and post chartin-20 minutes.    Nikko is doing well.  Reviewed his lab work all normal.  Has several questions about the Covid vaccine.  He had his antibodies drawn with lab work.  Those results are pending.        The following portions of the patient's history were reviewed and updated as appropriate: allergies, current medications, past family history, past medical history, past social history, past surgical history and problem list.    Review of Systems    Objective   Physical Exam    Assessment/Plan   Diagnoses and all orders for this visit:    1. Mixed hyperlipidemia (Primary)    No change in medications  There are no Patient Instructions on file for this visit.    Medications Discontinued During This Encounter   Medication Reason   • oxyCODONE-acetaminophen (PERCOCET) 5-325 MG per tablet Discontinued by another clinician   • amoxicillin (AMOXIL) 500 MG capsule *Therapy completed        Return in about 6 months (around 2021) for Medicare Wellness visit.    Dr. Nav Carmona  [facility]  [place], [place]

## 2021-05-26 ENCOUNTER — OFFICE VISIT (OUTPATIENT)
Dept: FAMILY MEDICINE CLINIC | Facility: CLINIC | Age: 71
End: 2021-05-26

## 2021-05-26 VITALS
BODY MASS INDEX: 26.92 KG/M2 | SYSTOLIC BLOOD PRESSURE: 140 MMHG | WEIGHT: 188 LBS | HEART RATE: 94 BPM | DIASTOLIC BLOOD PRESSURE: 78 MMHG | TEMPERATURE: 98 F | HEIGHT: 70 IN | OXYGEN SATURATION: 98 %

## 2021-05-26 DIAGNOSIS — Z87.11 HISTORY OF GASTRIC ULCER: Primary | ICD-10-CM

## 2021-05-26 LAB
ERYTHROCYTE [DISTWIDTH] IN BLOOD BY AUTOMATED COUNT: 12.3 % (ref 12.3–15.4)
HCT VFR BLD AUTO: 40.2 % (ref 37.5–51)
HGB BLD-MCNC: 13.7 G/DL (ref 13–17.7)
MCH RBC QN AUTO: 31.9 PG (ref 26.6–33)
MCHC RBC AUTO-ENTMCNC: 34.1 G/DL (ref 31.5–35.7)
MCV RBC AUTO: 93.5 FL (ref 79–97)
PLATELET # BLD AUTO: 244 10*3/MM3 (ref 140–450)
RBC # BLD AUTO: 4.3 10*6/MM3 (ref 4.14–5.8)
WBC # BLD AUTO: 12.61 10*3/MM3 (ref 3.4–10.8)

## 2021-05-26 PROCEDURE — 99213 OFFICE O/P EST LOW 20 MIN: CPT | Performed by: FAMILY MEDICINE

## 2021-05-26 RX ORDER — PANTOPRAZOLE SODIUM 40 MG/1
40 TABLET, DELAYED RELEASE ORAL DAILY
Qty: 30 TABLET | Refills: 0 | Status: SHIPPED | OUTPATIENT
Start: 2021-05-26 | End: 2021-06-18 | Stop reason: SDUPTHER

## 2021-05-26 NOTE — PROGRESS NOTES
"  Subjective   Perez Bolaños is a 71 y.o. male who is here for   Chief Complaint   Patient presents with   • Nausea     vomited this morning, no episode since - did have blood in vomit this morning    • Fatigue   .     History of Present Illness   Bill comes in today after having nausea and vomiting up a small amount of bright red blood and a blood clot this morning.  He has a history of gastric ulcer several years ago.  Which required blood transfusion.  He does not take a regular stomach acid medication.  Normally he has no heartburn issues.  He takes no anti-inflammatories he drinks no alcohol.  He does take a 81 mg aspirin a day.  He has no abdominal pain.  There is been no blood per rectum.  Gets occasional as needed bright red blood with constipation.  But no melena.    The following portions of the patient's history were reviewed and updated as appropriate: allergies, current medications, past family history, past medical history, past social history, past surgical history and problem list.    Review of Systems    Objective   Vitals:    05/26/21 1033   BP: 140/78   BP Location: Right arm   Patient Position: Sitting   Cuff Size: Adult   Pulse: 94   Temp: 98 °F (36.7 °C)   TempSrc: Temporal   SpO2: 98%   Weight: 85.3 kg (188 lb)   Height: 177.8 cm (70\")      Physical Exam  Cardiovascular:      Rate and Rhythm: Normal rate.   Pulmonary:      Effort: Pulmonary effort is normal.   Abdominal:      General: There is no distension.      Tenderness: There is no abdominal tenderness.   Neurological:      Mental Status: He is alert.     Does not appear to be ill.  He is not pale he is not diaphoretic.    Assessment/Plan   Diagnoses and all orders for this visit:    1. History of gastric ulcer (Primary)  -     CBC (No Diff)  -     pantoprazole (Protonix) 40 MG EC tablet; Take 1 tablet by mouth Daily. Indications: Stomach Ulcer  Dispense: 30 tablet; Refill: 0    Will check his heme-onc today, start Protonix, stop his " aspirin.  If this continues a worse he will head to [place] [facility]  There are no Patient Instructions on file for this visit.    Medications Discontinued During This Encounter   Medication Reason   • aspirin 81 MG oral suspension *Therapy completed        No follow-ups on file.    Dr. Nav Carmona  [place], [place]

## 2021-05-27 ENCOUNTER — ANESTHESIA (OUTPATIENT)
Dept: GASTROENTEROLOGY | Facility: HOSPITAL | Age: 71
End: 2021-05-27

## 2021-05-27 ENCOUNTER — HOSPITAL ENCOUNTER (INPATIENT)
Facility: HOSPITAL | Age: 71
LOS: 3 days | Discharge: HOME | End: 2021-05-30
Attending: INTERNAL MEDICINE | Admitting: INTERNAL MEDICINE

## 2021-05-27 ENCOUNTER — HOSPITAL ENCOUNTER (EMERGENCY)
Facility: HOSPITAL | Age: 71
Discharge: ANOTHER HOSPITAL | End: 2021-05-27
Attending: EMERGENCY MEDICINE | Admitting: EMERGENCY MEDICINE

## 2021-05-27 ENCOUNTER — ANESTHESIA EVENT (OUTPATIENT)
Dept: GASTROENTEROLOGY | Facility: HOSPITAL | Age: 71
End: 2021-05-27

## 2021-05-27 ENCOUNTER — TELEPHONE (OUTPATIENT)
Dept: FAMILY MEDICINE CLINIC | Facility: CLINIC | Age: 71
End: 2021-05-27

## 2021-05-27 ENCOUNTER — APPOINTMENT (OUTPATIENT)
Dept: GENERAL RADIOLOGY | Facility: HOSPITAL | Age: 71
End: 2021-05-27

## 2021-05-27 VITALS
WEIGHT: 176.5 LBS | BODY MASS INDEX: 25.27 KG/M2 | HEART RATE: 102 BPM | DIASTOLIC BLOOD PRESSURE: 74 MMHG | OXYGEN SATURATION: 100 % | RESPIRATION RATE: 18 BRPM | HEIGHT: 70 IN | TEMPERATURE: 97.7 F | SYSTOLIC BLOOD PRESSURE: 115 MMHG

## 2021-05-27 VITALS — OXYGEN SATURATION: 100 % | SYSTOLIC BLOOD PRESSURE: 133 MMHG | DIASTOLIC BLOOD PRESSURE: 61 MMHG

## 2021-05-27 DIAGNOSIS — R55 SYNCOPE, UNSPECIFIED SYNCOPE TYPE: ICD-10-CM

## 2021-05-27 DIAGNOSIS — K92.2 ACUTE UPPER GI BLEED: Primary | ICD-10-CM

## 2021-05-27 LAB
ABO GROUP BLD: NORMAL
ABO GROUP BLD: NORMAL
ALBUMIN SERPL-MCNC: 3.6 G/DL (ref 3.5–5.2)
ALBUMIN/GLOB SERPL: 2 G/DL
ALP SERPL-CCNC: 43 U/L (ref 39–117)
ALT SERPL W P-5'-P-CCNC: 13 U/L (ref 1–41)
ANION GAP SERPL CALCULATED.3IONS-SCNC: 11.5 MMOL/L (ref 5–15)
APTT PPP: 25.8 SECONDS (ref 24.3–38.1)
AST SERPL-CCNC: 14 U/L (ref 1–40)
BASOPHILS # BLD AUTO: 0.02 10*3/MM3 (ref 0–0.2)
BASOPHILS # BLD AUTO: 0.05 10*3/MM3 (ref 0–0.2)
BASOPHILS NFR BLD AUTO: 0.1 % (ref 0–1.5)
BASOPHILS NFR BLD AUTO: 0.3 % (ref 0–1.5)
BILIRUB SERPL-MCNC: 0.3 MG/DL (ref 0–1.2)
BLD GP AB SCN SERPL QL: NEGATIVE
BLD GP AB SCN SERPL QL: NEGATIVE
BUN SERPL-MCNC: 42 MG/DL (ref 8–23)
BUN/CREAT SERPL: 38.2 (ref 7–25)
CALCIUM SPEC-SCNC: 8.5 MG/DL (ref 8.6–10.5)
CHLORIDE SERPL-SCNC: 108 MMOL/L (ref 98–107)
CO2 SERPL-SCNC: 24.5 MMOL/L (ref 22–29)
CREAT SERPL-MCNC: 1.1 MG/DL (ref 0.76–1.27)
DEPRECATED RDW RBC AUTO: 46.6 FL (ref 37–54)
DEPRECATED RDW RBC AUTO: 47 FL (ref 37–54)
DEPRECATED RDW RBC AUTO: 47.5 FL (ref 37–54)
EOSINOPHIL # BLD AUTO: 0.01 10*3/MM3 (ref 0–0.4)
EOSINOPHIL # BLD AUTO: 0.06 10*3/MM3 (ref 0–0.4)
EOSINOPHIL NFR BLD AUTO: 0.1 % (ref 0.3–6.2)
EOSINOPHIL NFR BLD AUTO: 0.4 % (ref 0.3–6.2)
ERYTHROCYTE [DISTWIDTH] IN BLOOD BY AUTOMATED COUNT: 13.1 % (ref 12.3–15.4)
ERYTHROCYTE [DISTWIDTH] IN BLOOD BY AUTOMATED COUNT: 13.1 % (ref 12.3–15.4)
ERYTHROCYTE [DISTWIDTH] IN BLOOD BY AUTOMATED COUNT: 14.5 % (ref 12.3–15.4)
GFR SERPL CREATININE-BSD FRML MDRD: 66 ML/MIN/1.73
GLOBULIN UR ELPH-MCNC: 1.8 GM/DL
GLUCOSE BLDC GLUCOMTR-MCNC: 140 MG/DL (ref 70–130)
GLUCOSE BLDC GLUCOMTR-MCNC: 143 MG/DL (ref 70–130)
GLUCOSE SERPL-MCNC: 172 MG/DL (ref 65–99)
HCT VFR BLD AUTO: 31.7 % (ref 37.5–51)
HCT VFR BLD AUTO: 34.3 % (ref 37.5–51)
HCT VFR BLD AUTO: 35.6 % (ref 37.5–51)
HGB BLD-MCNC: 10.2 G/DL (ref 13–17.7)
HGB BLD-MCNC: 11.1 G/DL (ref 13–17.7)
HGB BLD-MCNC: 11.8 G/DL (ref 13–17.7)
IMM GRANULOCYTES # BLD AUTO: 0.06 10*3/MM3 (ref 0–0.05)
IMM GRANULOCYTES # BLD AUTO: 0.13 10*3/MM3 (ref 0–0.05)
IMM GRANULOCYTES NFR BLD AUTO: 0.3 % (ref 0–0.5)
IMM GRANULOCYTES NFR BLD AUTO: 0.8 % (ref 0–0.5)
INR PPP: 1.13 (ref 0.9–1.1)
LYMPHOCYTES # BLD AUTO: 3.66 10*3/MM3 (ref 0.7–3.1)
LYMPHOCYTES # BLD AUTO: 4.52 10*3/MM3 (ref 0.7–3.1)
LYMPHOCYTES NFR BLD AUTO: 22 % (ref 19.6–45.3)
LYMPHOCYTES NFR BLD AUTO: 23.3 % (ref 19.6–45.3)
MCH RBC QN AUTO: 29.4 PG (ref 26.6–33)
MCH RBC QN AUTO: 30.9 PG (ref 26.6–33)
MCH RBC QN AUTO: 31.2 PG (ref 26.6–33)
MCHC RBC AUTO-ENTMCNC: 32.2 G/DL (ref 31.5–35.7)
MCHC RBC AUTO-ENTMCNC: 32.4 G/DL (ref 31.5–35.7)
MCHC RBC AUTO-ENTMCNC: 33.1 G/DL (ref 31.5–35.7)
MCV RBC AUTO: 88.8 FL (ref 79–97)
MCV RBC AUTO: 96.1 FL (ref 79–97)
MCV RBC AUTO: 96.3 FL (ref 79–97)
MONOCYTES # BLD AUTO: 1.06 10*3/MM3 (ref 0.1–0.9)
MONOCYTES # BLD AUTO: 1.17 10*3/MM3 (ref 0.1–0.9)
MONOCYTES NFR BLD AUTO: 6 % (ref 5–12)
MONOCYTES NFR BLD AUTO: 6.4 % (ref 5–12)
NEUTROPHILS NFR BLD AUTO: 11.64 10*3/MM3 (ref 1.7–7)
NEUTROPHILS NFR BLD AUTO: 13.66 10*3/MM3 (ref 1.7–7)
NEUTROPHILS NFR BLD AUTO: 70.1 % (ref 42.7–76)
NEUTROPHILS NFR BLD AUTO: 70.2 % (ref 42.7–76)
NRBC BLD AUTO-RTO: 0 /100 WBC (ref 0–0.2)
PLATELET # BLD AUTO: 177 10*3/MM3 (ref 140–450)
PLATELET # BLD AUTO: 207 10*3/MM3 (ref 140–450)
PLATELET # BLD AUTO: 227 10*3/MM3 (ref 140–450)
PMV BLD AUTO: 10 FL (ref 6–12)
PMV BLD AUTO: 10.1 FL (ref 6–12)
PMV BLD AUTO: 10.2 FL (ref 6–12)
POTASSIUM SERPL-SCNC: 4.6 MMOL/L (ref 3.5–5.2)
PROT SERPL-MCNC: 5.4 G/DL (ref 6–8.5)
PROTHROMBIN TIME: 14.5 SECONDS (ref 12.1–15)
RBC # BLD AUTO: 3.3 10*6/MM3 (ref 4.14–5.8)
RBC # BLD AUTO: 3.56 10*6/MM3 (ref 4.14–5.8)
RBC # BLD AUTO: 4.01 10*6/MM3 (ref 4.14–5.8)
RH BLD: POSITIVE
RH BLD: POSITIVE
SARS-COV-2 RNA PNL SPEC NAA+PROBE: NOT DETECTED
SODIUM SERPL-SCNC: 144 MMOL/L (ref 136–145)
T&S EXPIRATION DATE: NORMAL
T&S EXPIRATION DATE: NORMAL
WBC # BLD AUTO: 16.6 10*3/MM3 (ref 3.4–10.8)
WBC # BLD AUTO: 19.44 10*3/MM3 (ref 3.4–10.8)
WBC # BLD AUTO: 19.61 10*3/MM3 (ref 3.4–10.8)

## 2021-05-27 PROCEDURE — 85025 COMPLETE CBC W/AUTO DIFF WBC: CPT | Performed by: EMERGENCY MEDICINE

## 2021-05-27 PROCEDURE — 86901 BLOOD TYPING SEROLOGIC RH(D): CPT | Performed by: INTERNAL MEDICINE

## 2021-05-27 PROCEDURE — 96376 TX/PRO/DX INJ SAME DRUG ADON: CPT

## 2021-05-27 PROCEDURE — 85610 PROTHROMBIN TIME: CPT | Performed by: EMERGENCY MEDICINE

## 2021-05-27 PROCEDURE — 86901 BLOOD TYPING SEROLOGIC RH(D): CPT

## 2021-05-27 PROCEDURE — 43255 EGD CONTROL BLEEDING ANY: CPT | Performed by: INTERNAL MEDICINE

## 2021-05-27 PROCEDURE — 82962 GLUCOSE BLOOD TEST: CPT

## 2021-05-27 PROCEDURE — 85027 COMPLETE CBC AUTOMATED: CPT | Performed by: INTERNAL MEDICINE

## 2021-05-27 PROCEDURE — 99285 EMERGENCY DEPT VISIT HI MDM: CPT | Performed by: EMERGENCY MEDICINE

## 2021-05-27 PROCEDURE — 86920 COMPATIBILITY TEST SPIN: CPT

## 2021-05-27 PROCEDURE — 96375 TX/PRO/DX INJ NEW DRUG ADDON: CPT

## 2021-05-27 PROCEDURE — 80053 COMPREHEN METABOLIC PANEL: CPT | Performed by: EMERGENCY MEDICINE

## 2021-05-27 PROCEDURE — 86900 BLOOD TYPING SEROLOGIC ABO: CPT | Performed by: EMERGENCY MEDICINE

## 2021-05-27 PROCEDURE — 25010000002 EPINEPHRINE PER 0.1 MG: Performed by: INTERNAL MEDICINE

## 2021-05-27 PROCEDURE — 36430 TRANSFUSION BLD/BLD COMPNT: CPT

## 2021-05-27 PROCEDURE — 85730 THROMBOPLASTIN TIME PARTIAL: CPT | Performed by: EMERGENCY MEDICINE

## 2021-05-27 PROCEDURE — 25010000002 ONDANSETRON PER 1 MG: Performed by: INTERNAL MEDICINE

## 2021-05-27 PROCEDURE — P9016 RBC LEUKOCYTES REDUCED: HCPCS

## 2021-05-27 PROCEDURE — 25010000002 ONDANSETRON PER 1 MG: Performed by: EMERGENCY MEDICINE

## 2021-05-27 PROCEDURE — 86900 BLOOD TYPING SEROLOGIC ABO: CPT | Performed by: INTERNAL MEDICINE

## 2021-05-27 PROCEDURE — 96366 THER/PROPH/DIAG IV INF ADDON: CPT

## 2021-05-27 PROCEDURE — 0W3P8ZZ CONTROL BLEEDING IN GASTROINTESTINAL TRACT, VIA NATURAL OR ARTIFICIAL OPENING ENDOSCOPIC: ICD-10-PCS | Performed by: INTERNAL MEDICINE

## 2021-05-27 PROCEDURE — 86850 RBC ANTIBODY SCREEN: CPT | Performed by: INTERNAL MEDICINE

## 2021-05-27 PROCEDURE — 25010000002 PROPOFOL 10 MG/ML EMULSION: Performed by: ANESTHESIOLOGY

## 2021-05-27 PROCEDURE — 99222 1ST HOSP IP/OBS MODERATE 55: CPT | Performed by: INTERNAL MEDICINE

## 2021-05-27 PROCEDURE — 99284 EMERGENCY DEPT VISIT MOD MDM: CPT

## 2021-05-27 PROCEDURE — 25010000003 LIDOCAINE 1 % SOLUTION: Performed by: ANESTHESIOLOGY

## 2021-05-27 PROCEDURE — 86900 BLOOD TYPING SEROLOGIC ABO: CPT

## 2021-05-27 PROCEDURE — 74018 RADEX ABDOMEN 1 VIEW: CPT

## 2021-05-27 PROCEDURE — 25010000002 METOCLOPRAMIDE PER 10 MG: Performed by: INTERNAL MEDICINE

## 2021-05-27 PROCEDURE — 96365 THER/PROPH/DIAG IV INF INIT: CPT

## 2021-05-27 PROCEDURE — 86850 RBC ANTIBODY SCREEN: CPT | Performed by: EMERGENCY MEDICINE

## 2021-05-27 PROCEDURE — 93005 ELECTROCARDIOGRAM TRACING: CPT | Performed by: EMERGENCY MEDICINE

## 2021-05-27 PROCEDURE — 86901 BLOOD TYPING SEROLOGIC RH(D): CPT | Performed by: EMERGENCY MEDICINE

## 2021-05-27 PROCEDURE — 87635 SARS-COV-2 COVID-19 AMP PRB: CPT | Performed by: EMERGENCY MEDICINE

## 2021-05-27 RX ORDER — DEXTROSE MONOHYDRATE 25 G/50ML
25 INJECTION, SOLUTION INTRAVENOUS
Status: DISCONTINUED | OUTPATIENT
Start: 2021-05-27 | End: 2021-05-30 | Stop reason: HOSPADM

## 2021-05-27 RX ORDER — ONDANSETRON 2 MG/ML
4 INJECTION INTRAMUSCULAR; INTRAVENOUS ONCE
Status: COMPLETED | OUTPATIENT
Start: 2021-05-27 | End: 2021-05-27

## 2021-05-27 RX ORDER — PANTOPRAZOLE SODIUM 40 MG/10ML
80 INJECTION, POWDER, LYOPHILIZED, FOR SOLUTION INTRAVENOUS ONCE
Status: COMPLETED | OUTPATIENT
Start: 2021-05-27 | End: 2021-05-27

## 2021-05-27 RX ORDER — SODIUM CHLORIDE 0.9 % (FLUSH) 0.9 %
10 SYRINGE (ML) INJECTION EVERY 12 HOURS SCHEDULED
Status: DISCONTINUED | OUTPATIENT
Start: 2021-05-27 | End: 2021-05-30 | Stop reason: HOSPADM

## 2021-05-27 RX ORDER — MAGNESIUM SULFATE HEPTAHYDRATE 40 MG/ML
2 INJECTION, SOLUTION INTRAVENOUS AS NEEDED
Status: DISCONTINUED | OUTPATIENT
Start: 2021-05-27 | End: 2021-05-30 | Stop reason: HOSPADM

## 2021-05-27 RX ORDER — HYDROCODONE BITARTRATE AND ACETAMINOPHEN 5; 325 MG/1; MG/1
1 TABLET ORAL EVERY 4 HOURS PRN
Status: DISCONTINUED | OUTPATIENT
Start: 2021-05-27 | End: 2021-05-30 | Stop reason: HOSPADM

## 2021-05-27 RX ORDER — ACETAMINOPHEN 650 MG/1
650 SUPPOSITORY RECTAL EVERY 4 HOURS PRN
Status: DISCONTINUED | OUTPATIENT
Start: 2021-05-27 | End: 2021-05-30 | Stop reason: HOSPADM

## 2021-05-27 RX ORDER — POTASSIUM CHLORIDE 7.45 MG/ML
10 INJECTION INTRAVENOUS
Status: DISCONTINUED | OUTPATIENT
Start: 2021-05-27 | End: 2021-05-30 | Stop reason: HOSPADM

## 2021-05-27 RX ORDER — METOCLOPRAMIDE HYDROCHLORIDE 5 MG/ML
10 INJECTION INTRAMUSCULAR; INTRAVENOUS ONCE
Status: COMPLETED | OUTPATIENT
Start: 2021-05-27 | End: 2021-05-27

## 2021-05-27 RX ORDER — SODIUM CHLORIDE 9 MG/ML
INJECTION, SOLUTION INTRAVENOUS
Status: DISCONTINUED
Start: 2021-05-27 | End: 2021-05-27 | Stop reason: HOSPADM

## 2021-05-27 RX ORDER — LIDOCAINE HYDROCHLORIDE 10 MG/ML
INJECTION, SOLUTION INFILTRATION; PERINEURAL AS NEEDED
Status: DISCONTINUED | OUTPATIENT
Start: 2021-05-27 | End: 2021-05-27 | Stop reason: SURG

## 2021-05-27 RX ORDER — SODIUM CHLORIDE 0.9 % (FLUSH) 0.9 %
10 SYRINGE (ML) INJECTION AS NEEDED
Status: DISCONTINUED | OUTPATIENT
Start: 2021-05-27 | End: 2021-05-27 | Stop reason: HOSPADM

## 2021-05-27 RX ORDER — ONDANSETRON 4 MG/1
4 TABLET, FILM COATED ORAL EVERY 6 HOURS PRN
Status: DISCONTINUED | OUTPATIENT
Start: 2021-05-27 | End: 2021-05-30 | Stop reason: HOSPADM

## 2021-05-27 RX ORDER — ACETAMINOPHEN 325 MG/1
650 TABLET ORAL EVERY 4 HOURS PRN
Status: DISCONTINUED | OUTPATIENT
Start: 2021-05-27 | End: 2021-05-30 | Stop reason: HOSPADM

## 2021-05-27 RX ORDER — SODIUM CHLORIDE 9 MG/ML
INJECTION, SOLUTION INTRAVENOUS CONTINUOUS PRN
Status: DISCONTINUED | OUTPATIENT
Start: 2021-05-27 | End: 2021-05-27 | Stop reason: SURG

## 2021-05-27 RX ORDER — ASPIRIN 325 MG
325 TABLET ORAL DAILY
COMMUNITY
End: 2021-05-30 | Stop reason: HOSPADM

## 2021-05-27 RX ORDER — MAGNESIUM SULFATE HEPTAHYDRATE 40 MG/ML
4 INJECTION, SOLUTION INTRAVENOUS AS NEEDED
Status: DISCONTINUED | OUTPATIENT
Start: 2021-05-27 | End: 2021-05-30 | Stop reason: HOSPADM

## 2021-05-27 RX ORDER — HYDRALAZINE HYDROCHLORIDE 20 MG/ML
10 INJECTION INTRAMUSCULAR; INTRAVENOUS EVERY 4 HOURS PRN
Status: DISCONTINUED | OUTPATIENT
Start: 2021-05-27 | End: 2021-05-30 | Stop reason: HOSPADM

## 2021-05-27 RX ORDER — ONDANSETRON 2 MG/ML
4 INJECTION INTRAMUSCULAR; INTRAVENOUS EVERY 6 HOURS PRN
Status: DISCONTINUED | OUTPATIENT
Start: 2021-05-27 | End: 2021-05-30 | Stop reason: HOSPADM

## 2021-05-27 RX ORDER — NICOTINE POLACRILEX 4 MG
15 LOZENGE BUCCAL
Status: DISCONTINUED | OUTPATIENT
Start: 2021-05-27 | End: 2021-05-30 | Stop reason: HOSPADM

## 2021-05-27 RX ORDER — PROPOFOL 10 MG/ML
VIAL (ML) INTRAVENOUS AS NEEDED
Status: DISCONTINUED | OUTPATIENT
Start: 2021-05-27 | End: 2021-05-27 | Stop reason: SURG

## 2021-05-27 RX ORDER — SODIUM CHLORIDE 0.9 % (FLUSH) 0.9 %
10 SYRINGE (ML) INJECTION AS NEEDED
Status: DISCONTINUED | OUTPATIENT
Start: 2021-05-27 | End: 2021-05-30 | Stop reason: HOSPADM

## 2021-05-27 RX ADMIN — LIDOCAINE HYDROCHLORIDE 100 MG: 10 INJECTION, SOLUTION INFILTRATION; PERINEURAL at 18:21

## 2021-05-27 RX ADMIN — SODIUM CHLORIDE 8 MG/HR: 900 INJECTION INTRAVENOUS at 20:24

## 2021-05-27 RX ADMIN — METOCLOPRAMIDE HYDROCHLORIDE 10 MG: 5 INJECTION INTRAMUSCULAR; INTRAVENOUS at 17:15

## 2021-05-27 RX ADMIN — PROPOFOL 20 MG: 10 INJECTION, EMULSION INTRAVENOUS at 18:24

## 2021-05-27 RX ADMIN — PROPOFOL 120 MG: 10 INJECTION, EMULSION INTRAVENOUS at 18:20

## 2021-05-27 RX ADMIN — PROPOFOL 20 MG: 10 INJECTION, EMULSION INTRAVENOUS at 18:28

## 2021-05-27 RX ADMIN — PROPOFOL 20 MG: 10 INJECTION, EMULSION INTRAVENOUS at 18:22

## 2021-05-27 RX ADMIN — SODIUM CHLORIDE 8 MG/HR: 900 INJECTION INTRAVENOUS at 09:09

## 2021-05-27 RX ADMIN — PROPOFOL 20 MG: 10 INJECTION, EMULSION INTRAVENOUS at 18:36

## 2021-05-27 RX ADMIN — PROPOFOL 20 MG: 10 INJECTION, EMULSION INTRAVENOUS at 18:21

## 2021-05-27 RX ADMIN — PROPOFOL 20 MG: 10 INJECTION, EMULSION INTRAVENOUS at 18:40

## 2021-05-27 RX ADMIN — PROPOFOL 20 MG: 10 INJECTION, EMULSION INTRAVENOUS at 18:30

## 2021-05-27 RX ADMIN — PROPOFOL 20 MG: 10 INJECTION, EMULSION INTRAVENOUS at 18:32

## 2021-05-27 RX ADMIN — PANTOPRAZOLE SODIUM 80 MG: 40 INJECTION, POWDER, FOR SOLUTION INTRAVENOUS at 09:07

## 2021-05-27 RX ADMIN — ONDANSETRON 4 MG: 2 INJECTION INTRAMUSCULAR; INTRAVENOUS at 14:56

## 2021-05-27 RX ADMIN — PROPOFOL 20 MG: 10 INJECTION, EMULSION INTRAVENOUS at 18:26

## 2021-05-27 RX ADMIN — PROPOFOL 20 MG: 10 INJECTION, EMULSION INTRAVENOUS at 18:38

## 2021-05-27 RX ADMIN — PROPOFOL 20 MG: 10 INJECTION, EMULSION INTRAVENOUS at 18:25

## 2021-05-27 RX ADMIN — PROPOFOL 20 MG: 10 INJECTION, EMULSION INTRAVENOUS at 18:34

## 2021-05-27 RX ADMIN — ONDANSETRON 4 MG: 2 INJECTION INTRAMUSCULAR; INTRAVENOUS at 09:04

## 2021-05-27 RX ADMIN — ONDANSETRON 4 MG: 2 INJECTION INTRAMUSCULAR; INTRAVENOUS at 19:45

## 2021-05-27 RX ADMIN — SODIUM CHLORIDE, PRESERVATIVE FREE 10 ML: 5 INJECTION INTRAVENOUS at 23:30

## 2021-05-27 RX ADMIN — PROPOFOL 20 MG: 10 INJECTION, EMULSION INTRAVENOUS at 18:23

## 2021-05-27 RX ADMIN — SODIUM CHLORIDE 8 MG/HR: 900 INJECTION INTRAVENOUS at 14:24

## 2021-05-27 RX ADMIN — SODIUM CHLORIDE: 9 INJECTION, SOLUTION INTRAVENOUS at 18:30

## 2021-05-27 NOTE — TELEPHONE ENCOUNTER
WIFE BEST CALL BACK NUMBER IS [phone]. CALLING IN TO ADVISE THAT HER  IN NOT IN [facility], THIS WILL BE TRANSPORTING HIM TO [facility] [place] FOR FURTHER OBSERVATION.

## 2021-05-27 NOTE — TELEPHONE ENCOUNTER
PATIENT WIFE CALLED TO LET DR MARLEY KNOW THAT SHE HAS TAKEN PATIENT TO ER AND THE GI DR IS NOT AVAILABLE SO THEY WILL BE TRANSPORTING PATIENT TO [facility] FOR FURTHER OBSERVATION.    PATIENT BLEEDING FROM BOTH ENDS.     PATIENT WIFE CALLED WITH JAVIER

## 2021-05-27 NOTE — ANESTHESIA POSTPROCEDURE EVALUATION
"Patient: Perez Bolaños    Procedure Summary     Date: 05/27/21 Room / Location:  [facility]    Anesthesia Start: 1820 Anesthesia Stop: 1855    Procedure: ESOPHAGOGASTRODUODENOSCOPY (N/A Esophagus) Diagnosis:       Acute upper GI bleed      (Acute upper GI bleed [K92.2])    Surgeons: Anurag Slade MD Provider: Anurag Kahn MD    Anesthesia Type: MAC ASA Status: 4 - Emergent          Anesthesia Type: MAC    Vitals  Vitals Value Taken Time   /61 05/27/21 1850   Temp     Pulse 92 05/27/21 1820   Resp     SpO2 100 % 05/27/21 1853   Vitals shown include unvalidated device data.        Post Anesthesia Care and Evaluation    Patient location during evaluation: bedside  Patient participation: complete - patient participated  Level of consciousness: awake and alert  Pain management: adequate  Airway patency: patent  Anesthetic complications: No anesthetic complications    Cardiovascular status: acceptable  Respiratory status: acceptable  Hydration status: acceptable    Comments: Temp 36.9 °C (98.4 °F) (Oral)   Ht 177.8 cm (70\")   Wt 81.1 kg (178 lb 12.7 oz)   BMI 25.65 kg/m²       "

## 2021-05-27 NOTE — ANESTHESIA PREPROCEDURE EVALUATION
Anesthesia Evaluation     Patient summary reviewed and Nursing notes reviewed   NPO Solid Status: > 8 hours             Airway   Mallampati: II  TM distance: >3 FB  Neck ROM: full  No difficulty expected  Dental - normal exam     Pulmonary - normal exam   (+) sleep apnea,   (-) not a smoker  Cardiovascular     ECG reviewed  Rhythm: regular    (+) valvular problems/murmurs MR, hyperlipidemia,   (-) murmur    ROS comment: MR? No M noted    Neuro/Psych  (+) headaches,     GI/Hepatic/Renal/Endo    (+)  GERD, PUD, GI bleeding ,     Musculoskeletal     Abdominal    Substance History      OB/GYN          Other                          Anesthesia Plan    ASA 4 - emergent     MAC     intravenous induction     Anesthetic plan, all risks, benefits, and alternatives have been provided, discussed and informed consent has been obtained with: patient.

## 2021-05-28 LAB
ALBUMIN SERPL-MCNC: 3.1 G/DL (ref 3.5–5.2)
ALBUMIN/GLOB SERPL: 1.9 G/DL
ALP SERPL-CCNC: 30 U/L (ref 39–117)
ALT SERPL W P-5'-P-CCNC: 13 U/L (ref 1–41)
ANION GAP SERPL CALCULATED.3IONS-SCNC: 7.4 MMOL/L (ref 5–15)
AST SERPL-CCNC: 16 U/L (ref 1–40)
BH BB BLOOD EXPIRATION DATE: NORMAL
BH BB BLOOD EXPIRATION DATE: NORMAL
BH BB BLOOD TYPE BARCODE: 5100
BH BB BLOOD TYPE BARCODE: 5100
BH BB DISPENSE STATUS: NORMAL
BH BB DISPENSE STATUS: NORMAL
BH BB PRODUCT CODE: NORMAL
BH BB PRODUCT CODE: NORMAL
BH BB UNIT NUMBER: NORMAL
BH BB UNIT NUMBER: NORMAL
BILIRUB SERPL-MCNC: 0.3 MG/DL (ref 0–1.2)
BUN SERPL-MCNC: 37 MG/DL (ref 8–23)
BUN/CREAT SERPL: 30.8 (ref 7–25)
CALCIUM SPEC-SCNC: 7.9 MG/DL (ref 8.6–10.5)
CHLORIDE SERPL-SCNC: 112 MMOL/L (ref 98–107)
CO2 SERPL-SCNC: 21.6 MMOL/L (ref 22–29)
CREAT SERPL-MCNC: 1.2 MG/DL (ref 0.76–1.27)
CROSSMATCH INTERPRETATION: NORMAL
CROSSMATCH INTERPRETATION: NORMAL
DEPRECATED RDW RBC AUTO: 49.7 FL (ref 37–54)
DEPRECATED RDW RBC AUTO: 50.3 FL (ref 37–54)
DEPRECATED RDW RBC AUTO: 53.2 FL (ref 37–54)
ERYTHROCYTE [DISTWIDTH] IN BLOOD BY AUTOMATED COUNT: 15.1 % (ref 12.3–15.4)
ERYTHROCYTE [DISTWIDTH] IN BLOOD BY AUTOMATED COUNT: 15.2 % (ref 12.3–15.4)
ERYTHROCYTE [DISTWIDTH] IN BLOOD BY AUTOMATED COUNT: 15.4 % (ref 12.3–15.4)
GFR SERPL CREATININE-BSD FRML MDRD: 59 ML/MIN/1.73
GLOBULIN UR ELPH-MCNC: 1.6 GM/DL
GLUCOSE BLDC GLUCOMTR-MCNC: 127 MG/DL (ref 70–130)
GLUCOSE BLDC GLUCOMTR-MCNC: 128 MG/DL (ref 70–130)
GLUCOSE BLDC GLUCOMTR-MCNC: 157 MG/DL (ref 70–130)
GLUCOSE BLDC GLUCOMTR-MCNC: 99 MG/DL (ref 70–130)
GLUCOSE SERPL-MCNC: 127 MG/DL (ref 65–99)
HCT VFR BLD AUTO: 27.2 % (ref 37.5–51)
HCT VFR BLD AUTO: 29 % (ref 37.5–51)
HCT VFR BLD AUTO: 31.6 % (ref 37.5–51)
HGB BLD-MCNC: 10.6 G/DL (ref 13–17.7)
HGB BLD-MCNC: 8.8 G/DL (ref 13–17.7)
HGB BLD-MCNC: 9.5 G/DL (ref 13–17.7)
MCH RBC QN AUTO: 29.6 PG (ref 26.6–33)
MCH RBC QN AUTO: 29.9 PG (ref 26.6–33)
MCH RBC QN AUTO: 30.2 PG (ref 26.6–33)
MCHC RBC AUTO-ENTMCNC: 32.4 G/DL (ref 31.5–35.7)
MCHC RBC AUTO-ENTMCNC: 32.8 G/DL (ref 31.5–35.7)
MCHC RBC AUTO-ENTMCNC: 33.5 G/DL (ref 31.5–35.7)
MCV RBC AUTO: 89 FL (ref 79–97)
MCV RBC AUTO: 90.3 FL (ref 79–97)
MCV RBC AUTO: 93.5 FL (ref 79–97)
PLATELET # BLD AUTO: 126 10*3/MM3 (ref 140–450)
PLATELET # BLD AUTO: 148 10*3/MM3 (ref 140–450)
PLATELET # BLD AUTO: 156 10*3/MM3 (ref 140–450)
PMV BLD AUTO: 10 FL (ref 6–12)
PMV BLD AUTO: 10.4 FL (ref 6–12)
PMV BLD AUTO: 10.5 FL (ref 6–12)
POTASSIUM SERPL-SCNC: 4.2 MMOL/L (ref 3.5–5.2)
PROT SERPL-MCNC: 4.7 G/DL (ref 6–8.5)
RBC # BLD AUTO: 2.91 10*6/MM3 (ref 4.14–5.8)
RBC # BLD AUTO: 3.21 10*6/MM3 (ref 4.14–5.8)
RBC # BLD AUTO: 3.55 10*6/MM3 (ref 4.14–5.8)
SODIUM SERPL-SCNC: 141 MMOL/L (ref 136–145)
TROPONIN T SERPL-MCNC: <0.01 NG/ML (ref 0–0.03)
UNIT  ABO: NORMAL
UNIT  ABO: NORMAL
UNIT  RH: NORMAL
UNIT  RH: NORMAL
WBC # BLD AUTO: 10.93 10*3/MM3 (ref 3.4–10.8)
WBC # BLD AUTO: 15.55 10*3/MM3 (ref 3.4–10.8)
WBC # BLD AUTO: 18.33 10*3/MM3 (ref 3.4–10.8)

## 2021-05-28 PROCEDURE — 84484 ASSAY OF TROPONIN QUANT: CPT | Performed by: INTERNAL MEDICINE

## 2021-05-28 PROCEDURE — 25010000002 ONDANSETRON PER 1 MG: Performed by: INTERNAL MEDICINE

## 2021-05-28 PROCEDURE — 85027 COMPLETE CBC AUTOMATED: CPT | Performed by: INTERNAL MEDICINE

## 2021-05-28 PROCEDURE — 80053 COMPREHEN METABOLIC PANEL: CPT | Performed by: INTERNAL MEDICINE

## 2021-05-28 PROCEDURE — 82962 GLUCOSE BLOOD TEST: CPT

## 2021-05-28 PROCEDURE — 87338 HPYLORI STOOL AG IA: CPT | Performed by: INTERNAL MEDICINE

## 2021-05-28 PROCEDURE — 99232 SBSQ HOSP IP/OBS MODERATE 35: CPT | Performed by: INTERNAL MEDICINE

## 2021-05-28 RX ADMIN — SODIUM CHLORIDE, PRESERVATIVE FREE 10 ML: 5 INJECTION INTRAVENOUS at 23:09

## 2021-05-28 RX ADMIN — SODIUM CHLORIDE 8 MG/HR: 900 INJECTION INTRAVENOUS at 17:11

## 2021-05-28 RX ADMIN — SODIUM CHLORIDE 8 MG/HR: 900 INJECTION INTRAVENOUS at 22:58

## 2021-05-28 RX ADMIN — SODIUM CHLORIDE, PRESERVATIVE FREE 10 ML: 5 INJECTION INTRAVENOUS at 08:09

## 2021-05-28 RX ADMIN — SODIUM CHLORIDE 8 MG/HR: 900 INJECTION INTRAVENOUS at 02:21

## 2021-05-28 RX ADMIN — SODIUM CHLORIDE 8 MG/HR: 900 INJECTION INTRAVENOUS at 12:32

## 2021-05-28 RX ADMIN — SODIUM CHLORIDE 8 MG/HR: 900 INJECTION INTRAVENOUS at 07:12

## 2021-05-28 RX ADMIN — ONDANSETRON 4 MG: 2 INJECTION INTRAMUSCULAR; INTRAVENOUS at 03:58

## 2021-05-29 LAB
DEPRECATED RDW RBC AUTO: 46.9 FL (ref 37–54)
DEPRECATED RDW RBC AUTO: 51.1 FL (ref 37–54)
DEPRECATED RDW RBC AUTO: 51.3 FL (ref 37–54)
ERYTHROCYTE [DISTWIDTH] IN BLOOD BY AUTOMATED COUNT: 14.5 % (ref 12.3–15.4)
ERYTHROCYTE [DISTWIDTH] IN BLOOD BY AUTOMATED COUNT: 15.1 % (ref 12.3–15.4)
ERYTHROCYTE [DISTWIDTH] IN BLOOD BY AUTOMATED COUNT: 15.2 % (ref 12.3–15.4)
GLUCOSE BLDC GLUCOMTR-MCNC: 101 MG/DL (ref 70–130)
GLUCOSE BLDC GLUCOMTR-MCNC: 107 MG/DL (ref 70–130)
GLUCOSE BLDC GLUCOMTR-MCNC: 116 MG/DL (ref 70–130)
GLUCOSE BLDC GLUCOMTR-MCNC: 127 MG/DL (ref 70–130)
GLUCOSE BLDC GLUCOMTR-MCNC: 139 MG/DL (ref 70–130)
GLUCOSE BLDC GLUCOMTR-MCNC: 169 MG/DL (ref 70–130)
HCT VFR BLD AUTO: 25.5 % (ref 37.5–51)
HCT VFR BLD AUTO: 26.7 % (ref 37.5–51)
HCT VFR BLD AUTO: 27.4 % (ref 37.5–51)
HGB BLD-MCNC: 8.6 G/DL (ref 13–17.7)
HGB BLD-MCNC: 8.7 G/DL (ref 13–17.7)
HGB BLD-MCNC: 9 G/DL (ref 13–17.7)
MCH RBC QN AUTO: 30.1 PG (ref 26.6–33)
MCH RBC QN AUTO: 30.3 PG (ref 26.6–33)
MCH RBC QN AUTO: 30.4 PG (ref 26.6–33)
MCHC RBC AUTO-ENTMCNC: 32.6 G/DL (ref 31.5–35.7)
MCHC RBC AUTO-ENTMCNC: 32.8 G/DL (ref 31.5–35.7)
MCHC RBC AUTO-ENTMCNC: 33.7 G/DL (ref 31.5–35.7)
MCV RBC AUTO: 90.1 FL (ref 79–97)
MCV RBC AUTO: 92.3 FL (ref 79–97)
MCV RBC AUTO: 92.4 FL (ref 79–97)
PLATELET # BLD AUTO: 103 10*3/MM3 (ref 140–450)
PLATELET # BLD AUTO: 126 10*3/MM3 (ref 140–450)
PLATELET # BLD AUTO: 139 10*3/MM3 (ref 140–450)
PMV BLD AUTO: 10.1 FL (ref 6–12)
PMV BLD AUTO: 10.2 FL (ref 6–12)
PMV BLD AUTO: 10.4 FL (ref 6–12)
RBC # BLD AUTO: 2.83 10*6/MM3 (ref 4.14–5.8)
RBC # BLD AUTO: 2.89 10*6/MM3 (ref 4.14–5.8)
RBC # BLD AUTO: 2.97 10*6/MM3 (ref 4.14–5.8)
WBC # BLD AUTO: 10.09 10*3/MM3 (ref 3.4–10.8)
WBC # BLD AUTO: 10.4 10*3/MM3 (ref 3.4–10.8)
WBC # BLD AUTO: 9.63 10*3/MM3 (ref 3.4–10.8)

## 2021-05-29 PROCEDURE — 85027 COMPLETE CBC AUTOMATED: CPT | Performed by: INTERNAL MEDICINE

## 2021-05-29 PROCEDURE — 82962 GLUCOSE BLOOD TEST: CPT

## 2021-05-29 PROCEDURE — 99232 SBSQ HOSP IP/OBS MODERATE 35: CPT | Performed by: INTERNAL MEDICINE

## 2021-05-29 RX ADMIN — SODIUM CHLORIDE 8 MG/HR: 900 INJECTION INTRAVENOUS at 20:50

## 2021-05-29 RX ADMIN — SODIUM CHLORIDE, PRESERVATIVE FREE 10 ML: 5 INJECTION INTRAVENOUS at 20:50

## 2021-05-29 RX ADMIN — SODIUM CHLORIDE 8 MG/HR: 900 INJECTION INTRAVENOUS at 10:24

## 2021-05-29 RX ADMIN — SODIUM CHLORIDE 8 MG/HR: 900 INJECTION INTRAVENOUS at 15:45

## 2021-05-29 RX ADMIN — SODIUM CHLORIDE 8 MG/HR: 900 INJECTION INTRAVENOUS at 04:09

## 2021-05-29 RX ADMIN — SODIUM CHLORIDE, PRESERVATIVE FREE 10 ML: 5 INJECTION INTRAVENOUS at 08:40

## 2021-05-30 ENCOUNTER — READMISSION MANAGEMENT (OUTPATIENT)
Dept: CALL CENTER | Facility: HOSPITAL | Age: 71
End: 2021-05-30

## 2021-05-30 VITALS
TEMPERATURE: 99 F | BODY MASS INDEX: 25.6 KG/M2 | WEIGHT: 178.79 LBS | DIASTOLIC BLOOD PRESSURE: 67 MMHG | OXYGEN SATURATION: 97 % | HEART RATE: 73 BPM | RESPIRATION RATE: 18 BRPM | SYSTOLIC BLOOD PRESSURE: 115 MMHG | HEIGHT: 70 IN

## 2021-05-30 PROBLEM — K92.2 ACUTE UPPER GI BLEED: Status: RESOLVED | Noted: 2021-05-27 | Resolved: 2021-05-30

## 2021-05-30 LAB
DEPRECATED RDW RBC AUTO: 47.5 FL (ref 37–54)
ERYTHROCYTE [DISTWIDTH] IN BLOOD BY AUTOMATED COUNT: 14.6 % (ref 12.3–15.4)
GLUCOSE BLDC GLUCOMTR-MCNC: 109 MG/DL (ref 70–130)
GLUCOSE BLDC GLUCOMTR-MCNC: 110 MG/DL (ref 70–130)
HCT VFR BLD AUTO: 25.6 % (ref 37.5–51)
HGB BLD-MCNC: 8.5 G/DL (ref 13–17.7)
MCH RBC QN AUTO: 30.1 PG (ref 26.6–33)
MCHC RBC AUTO-ENTMCNC: 33.2 G/DL (ref 31.5–35.7)
MCV RBC AUTO: 90.8 FL (ref 79–97)
PLATELET # BLD AUTO: 153 10*3/MM3 (ref 140–450)
PMV BLD AUTO: 9.9 FL (ref 6–12)
RBC # BLD AUTO: 2.82 10*6/MM3 (ref 4.14–5.8)
WBC # BLD AUTO: 8.96 10*3/MM3 (ref 3.4–10.8)

## 2021-05-30 PROCEDURE — 82962 GLUCOSE BLOOD TEST: CPT

## 2021-05-30 PROCEDURE — 99232 SBSQ HOSP IP/OBS MODERATE 35: CPT | Performed by: INTERNAL MEDICINE

## 2021-05-30 PROCEDURE — 85027 COMPLETE CBC AUTOMATED: CPT | Performed by: INTERNAL MEDICINE

## 2021-05-30 RX ORDER — PANTOPRAZOLE SODIUM 40 MG/1
40 TABLET, DELAYED RELEASE ORAL
Status: DISCONTINUED | OUTPATIENT
Start: 2021-05-30 | End: 2021-05-30 | Stop reason: HOSPADM

## 2021-05-30 RX ADMIN — SODIUM CHLORIDE 8 MG/HR: 900 INJECTION INTRAVENOUS at 02:23

## 2021-05-30 RX ADMIN — SODIUM CHLORIDE 8 MG/HR: 900 INJECTION INTRAVENOUS at 07:59

## 2021-05-30 RX ADMIN — SODIUM CHLORIDE, PRESERVATIVE FREE 10 ML: 5 INJECTION INTRAVENOUS at 07:59

## 2021-05-30 RX ADMIN — PANTOPRAZOLE SODIUM 40 MG: 40 TABLET, DELAYED RELEASE ORAL at 10:17

## 2021-05-30 NOTE — OUTREACH NOTE
Prep Survey      Responses   [facility] patient discharged from?  [place]   Is LACE score < 7 ?  No   Emergency Room discharge w/ pulse ox?  No   Eligibility  [facility]   Date of Admission  05/27/21   Date of Discharge  05/30/21   Discharge Disposition  Home or Self Care   Discharge diagnosis  Upper GI bleed,    Acute blood loss anemia   Does the patient have one of the following disease processes/diagnoses(primary or secondary)?  Other   Does the patient have Home health ordered?  No   Is there a DME ordered?  No   Prep survey completed?  Yes          Angeles Ryan RN

## 2021-05-31 LAB
BH BB BLOOD EXPIRATION DATE: NORMAL
BH BB BLOOD TYPE BARCODE: 5100
BH BB DISPENSE STATUS: NORMAL
BH BB PRODUCT CODE: NORMAL
BH BB UNIT NUMBER: NORMAL
CROSSMATCH INTERPRETATION: NORMAL
UNIT  ABO: NORMAL
UNIT  RH: NORMAL

## 2021-06-01 ENCOUNTER — TRANSITIONAL CARE MANAGEMENT TELEPHONE ENCOUNTER (OUTPATIENT)
Dept: CALL CENTER | Facility: HOSPITAL | Age: 71
End: 2021-06-01

## 2021-06-01 LAB — H PYLORI AG STL QL IA: NEGATIVE

## 2021-06-01 NOTE — CASE MANAGEMENT/SOCIAL WORK
Case Management Discharge Note      Final Note: home via private auto         Selected Continued Care - Discharged on 5/30/2021 Admission date: 5/27/2021 - Discharge disposition: Home or Self Care    Destination    No services have been selected for the patient.              Durable Medical Equipment    No services have been selected for the patient.              Dialysis/Infusion    No services have been selected for the patient.              Home Medical Care    No services have been selected for the patient.              Therapy    No services have been selected for the patient.              Community Resources    No services have been selected for the patient.                  Transportation Services  Private: Car    Final Discharge Disposition Code: 01 - home or self-care

## 2021-06-01 NOTE — OUTREACH NOTE
Call Center TCM Note      Responses   [facility] patient discharged from?  [place]   Does the patient have one of the following disease processes/diagnoses(primary or secondary)?  Other   TCM attempt successful?  Yes   Discharge diagnosis  Upper GI bleed,    Acute blood loss anemia   Meds reviewed with patient/caregiver?  Yes   Does the patient have all medications ordered at discharge?  N/A   Is the patient taking all medications as directed (includes completed medication regime)?  N/A   Does the patient have a primary care provider?   Yes   Does the patient have an appointment with their PCP within 7 days of discharge?  Yes   Comments regarding PCP  TCM FWP with PCP Dr Carmona is 06/03/2021, pt will fwp with GI MD on 06/29/2021.   Has the patient kept scheduled appointments due by today?  Yes   Has home health visited the patient within 72 hours of discharge?  N/A   Psychosocial issues?  No   Did the patient receive a copy of their discharge instructions?  Yes   Nursing interventions  Reviewed instructions with patient   What is the patient's perception of their health status since discharge?  Improving   Is the patient/caregiver able to teach back signs and symptoms related to disease process for when to call PCP?  Yes   Is the patient/caregiver able to teach back signs and symptoms related to disease process for when to call 911?  Yes   Is the patient/caregiver able to teach back the hierarchy of who to call/visit for symptoms/problems? PCP, Specialist, Home health nurse, Urgent Care, ED, 911  Yes   If the patient is a current smoker, are they able to teach back resources for cessation?  Not a smoker   TCM call completed?  Yes   Wrap up additional comments  Pt feels a bit better each day, still weak but improving. Little appetite but eating small amounts. No new meds from d/c but his regular MD has started him on Protonix and he is taking this daily. TCM FWP with PCP Dr Carmona is 06/03/2021, pt will  fwp with JONATHAN HAYES on 06/29/2021.          Nathalia Cardona MA    6/1/2021, 15:00 EDT

## 2021-06-02 ENCOUNTER — TELEPHONE (OUTPATIENT)
Dept: FAMILY MEDICINE CLINIC | Facility: CLINIC | Age: 71
End: 2021-06-02

## 2021-06-02 NOTE — TELEPHONE ENCOUNTER
Caller: Wanda Bolaños    Relationship: Emergency Contact    Best call back number:   [phone]    What is the best time to reach you: ANYTIME    Who are you requesting to speak with (clinical staff, provider,  specific staff member): SHELLI    Do you know the name of the person who called:    What was the call regarding: WANDA IS CALLING IN TO INFORM SHELLI THAT PATIENT HAS SOME [employer] TICKETS BUT PATIENT IS STILL NOT FEELING WELL SO THEY CANT GO.  THEY HAVE INSURANCE ON THE TICKETS AND WILL NEED A LETTER FROM THE OFFICE STATING THAT HE HAS A MEDICAL CONDITION THAT IS KEEPING HIM FROM ATTENDING.  WANDA WANTS TO KNOW IF SHE NEEDS TO WRITE THE LETTER SO THAT SHELLI CAN SIGN IT OR IF SHELLI WILL WRITE THE LETTER FOR HER.     Do you require a callback: YES

## 2021-06-03 ENCOUNTER — OFFICE VISIT (OUTPATIENT)
Dept: FAMILY MEDICINE CLINIC | Facility: CLINIC | Age: 71
End: 2021-06-03

## 2021-06-03 VITALS
OXYGEN SATURATION: 99 % | TEMPERATURE: 98 F | DIASTOLIC BLOOD PRESSURE: 80 MMHG | SYSTOLIC BLOOD PRESSURE: 128 MMHG | BODY MASS INDEX: 25.77 KG/M2 | WEIGHT: 180 LBS | HEIGHT: 70 IN | HEART RATE: 75 BPM

## 2021-06-03 DIAGNOSIS — R71.0 PRECIPITOUS DROP IN HEMATOCRIT: ICD-10-CM

## 2021-06-03 DIAGNOSIS — Z87.11 HISTORY OF GASTRIC ULCER: Primary | ICD-10-CM

## 2021-06-03 DIAGNOSIS — Z09 HOSPITAL DISCHARGE FOLLOW-UP: ICD-10-CM

## 2021-06-03 PROCEDURE — 99214 OFFICE O/P EST MOD 30 MIN: CPT | Performed by: FAMILY MEDICINE

## 2021-06-03 RX ORDER — DOXYCYCLINE HYCLATE 50 MG/1
324 CAPSULE, GELATIN COATED ORAL
Qty: 90 TABLET | Refills: 0 | Status: SHIPPED | OUTPATIENT
Start: 2021-06-03 | End: 2022-04-01

## 2021-06-03 NOTE — PROGRESS NOTES
Subjective   Perez Bolaños is a 71 y.o. male who is here for   Chief Complaint   Patient presents with   • Hospital Follow Up Visit     [facility]- would like CBC checked today    • Letter for School/Work     reimbursed for concert tickets due to recent hospital stay/not feeling well    .     History of Present Illness   Nikko is here in follow-up with his gastric ulcer bleeding.  Saw me office last week and he had a bout of bright red blood emesis.  X1.  He was concerned as he had a previous gastric ulcer with a bleeding artery many years ago which required transfusion.  In that office setting we stopped his aspirin and lukasz a CBC.  His hemoglobin was slightly low at that time.    Unfortunately he quickly decompensated at home with increasing abdominal pain and vomiting multiple times of bright red blood.  He went to [facility] where he was seen had multiple episodes of bright red blood vomiting there in the beginning of melena.  His blood pressure was low he needed IV fluids.  He was transferred to [facility] ICU for critical care.  There he received several units of packed RBC transfusions due to a dropping hemoglobin and low blood pressures and tachycardia.  Dr. Walls he performed an EGD which revealed the large ulcer in his stomach with a visible bleeding vessel.  Which was cauterized and treated.    He is now home off aspirin and now on Protonix.  He left the hospital hemoglobin 8.5.  Explained this will take several weeks to months for his hemoglobin to improve.  Until the time he will be fatigue.  He is advised no strenuous activity.  We will start once a day iron today as well.  [place] diet and titrate up as tolerated.    Reviewed all his hospital records and labs.  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  He was supposed to see his favorite band  [place].  We will write a letter on his behalf that he is not able to make  "that concert due to medical issues.    The following portions of the patient's history were reviewed and updated as appropriate: allergies, current medications, past family history, past medical history, past social history, past surgical history and problem list.    Review of Systems    Objective   Vitals:    06/03/21 0918   BP: 128/80   BP Location: Left arm   Patient Position: Sitting   Cuff Size: Adult   Pulse: 75   Temp: 98 °F (36.7 °C)   TempSrc: Temporal   SpO2: 99%   Weight: 81.6 kg (180 lb)   Height: 177.8 cm (70\")      Physical Exam  Cardiovascular:      Rate and Rhythm: Normal rate.   Pulmonary:      Effort: Pulmonary effort is normal.   Abdominal:      Tenderness: There is abdominal tenderness.   Neurological:      Mental Status: He is alert.   Psychiatric:         Mood and Affect: Mood normal.         Assessment/Plan   Diagnoses and all orders for this visit:    1. History of gastric ulcer (Primary)  -     Hemoglobin & Hematocrit, Blood  -     Iron and TIBC  -     Ferritin    2. Precipitous drop in hematocrit   -     Iron and TIBC  -     Ferritin  -     ferrous gluconate (FERGON) 324 MG tablet; Take 1 tablet by mouth Daily With Breakfast.  Dispense: 90 tablet; Refill: 0    3. Hospital discharge follow-up      There are no Patient Instructions on file for this visit.    There are no discontinued medications.     No follow-ups on file.    Dr. Nav Carmona  [facility] [place], [place]    "

## 2021-06-04 LAB
FERRITIN SERPL-MCNC: 92.6 NG/ML (ref 30–400)
HCT VFR BLD AUTO: 30.1 % (ref 37.5–51)
HGB BLD-MCNC: 9.9 G/DL (ref 13–17.7)
IRON SATN MFR SERPL: 9 % (ref 20–50)
IRON SERPL-MCNC: 27 MCG/DL (ref 59–158)
TIBC SERPL-MCNC: 310 MCG/DL
UIBC SERPL-MCNC: 283 MCG/DL (ref 112–346)

## 2021-06-17 ENCOUNTER — PATIENT MESSAGE (OUTPATIENT)
Dept: FAMILY MEDICINE CLINIC | Facility: CLINIC | Age: 71
End: 2021-06-17

## 2021-06-17 DIAGNOSIS — Z87.11 HISTORY OF GASTRIC ULCER: ICD-10-CM

## 2021-06-18 RX ORDER — PANTOPRAZOLE SODIUM 40 MG/1
40 TABLET, DELAYED RELEASE ORAL DAILY
Qty: 90 TABLET | Refills: 0 | Status: SHIPPED | OUTPATIENT
Start: 2021-06-18 | End: 2021-09-15

## 2021-06-18 NOTE — TELEPHONE ENCOUNTER
From: Perez Bolaños  To: Nav Carmona MD  Sent: 6/17/2021 6:38 PM EDT  Subject: Prescription Question    Hi, Dr. Carmona.     I have a question regarding my Pantoprazole prescription. You prescribed it the day before I began vomiting blood and had to be hospitalized due to my bleeding ulcer. Before knowing about the bleed, the plan was for me to take the medication for a month. So there are no refills, and I will run out of pills 2 days before my follow-up visit with Dr. Cabral's nurse practioner.     I wondered if you would like to refill my medication, or have me wait and ask the NP if I should continue taking the drug for awhile after I see her (even though that would mean I would go 2 or 3 days without taking it after running out).    Thanks,    Nikko Bolaños

## 2021-06-29 ENCOUNTER — OFFICE VISIT (OUTPATIENT)
Dept: GASTROENTEROLOGY | Facility: CLINIC | Age: 71
End: 2021-06-29

## 2021-06-29 VITALS — BODY MASS INDEX: 26.43 KG/M2 | WEIGHT: 184.6 LBS | HEIGHT: 70 IN

## 2021-06-29 DIAGNOSIS — K25.3 ACUTE GASTRIC ULCER, UNSPECIFIED WHETHER GASTRIC ULCER HEMORRHAGE OR PERFORATION PRESENT: Primary | ICD-10-CM

## 2021-06-29 DIAGNOSIS — D50.8 OTHER IRON DEFICIENCY ANEMIA: ICD-10-CM

## 2021-06-29 DIAGNOSIS — K22.2 SCHATZKI'S RING: ICD-10-CM

## 2021-06-29 LAB — GLUCOSE BLDC GLUCOMTR-MCNC: 168 MG/DL (ref 70–130)

## 2021-06-29 PROCEDURE — 99214 OFFICE O/P EST MOD 30 MIN: CPT | Performed by: NURSE PRACTITIONER
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
## 2021-06-30 ENCOUNTER — TELEPHONE (OUTPATIENT)
Dept: GASTROENTEROLOGY | Facility: CLINIC | Age: 71
End: 2021-06-30

## 2021-06-30 DIAGNOSIS — D64.9 ANEMIA, UNSPECIFIED TYPE: Primary | ICD-10-CM

## 2021-06-30 LAB
BASOPHILS # BLD AUTO: 0.02 10*3/MM3 (ref 0–0.2)
BASOPHILS NFR BLD AUTO: 0.3 % (ref 0–1.5)
EOSINOPHIL # BLD AUTO: 0.17 10*3/MM3 (ref 0–0.4)
EOSINOPHIL NFR BLD AUTO: 2.4 % (ref 0.3–6.2)
ERYTHROCYTE [DISTWIDTH] IN BLOOD BY AUTOMATED COUNT: 14.1 % (ref 12.3–15.4)
FERRITIN SERPL-MCNC: 30.8 NG/ML (ref 30–400)
HCT VFR BLD AUTO: 37.7 % (ref 37.5–51)
HGB BLD-MCNC: 11.6 G/DL (ref 13–17.7)
IMM GRANULOCYTES # BLD AUTO: 0.02 10*3/MM3 (ref 0–0.05)
IMM GRANULOCYTES NFR BLD AUTO: 0.3 % (ref 0–0.5)
IRON SATN MFR SERPL: 7 % (ref 20–50)
IRON SERPL-MCNC: 27 MCG/DL (ref 59–158)
LYMPHOCYTES # BLD AUTO: 1.83 10*3/MM3 (ref 0.7–3.1)
LYMPHOCYTES NFR BLD AUTO: 25.8 % (ref 19.6–45.3)
MCH RBC QN AUTO: 28.3 PG (ref 26.6–33)
MCHC RBC AUTO-ENTMCNC: 30.8 G/DL (ref 31.5–35.7)
MCV RBC AUTO: 92 FL (ref 79–97)
MONOCYTES # BLD AUTO: 0.61 10*3/MM3 (ref 0.1–0.9)
MONOCYTES NFR BLD AUTO: 8.6 % (ref 5–12)
NEUTROPHILS # BLD AUTO: 4.44 10*3/MM3 (ref 1.7–7)
NEUTROPHILS NFR BLD AUTO: 62.6 % (ref 42.7–76)
NRBC BLD AUTO-RTO: 0.1 /100 WBC (ref 0–0.2)
PLATELET # BLD AUTO: 243 10*3/MM3 (ref 140–450)
RBC # BLD AUTO: 4.1 10*6/MM3 (ref 4.14–5.8)
TIBC SERPL-MCNC: 377 MCG/DL
UIBC SERPL-MCNC: 350 MCG/DL (ref 112–346)
WBC # BLD AUTO: 7.09 10*3/MM3 (ref 3.4–10.8)

## 2021-06-30 NOTE — PROGRESS NOTES
Please inform Perez that his lab work shows improving anemia with a hemoglobin up to 11.6.  He still iron deficient therefore I would continue once daily iron supplementation.  Lets repeat CBC and iron studies/TIBC 4 weeks.

## 2021-07-15 ENCOUNTER — LAB (OUTPATIENT)
Dept: LAB | Facility: HOSPITAL | Age: 71
End: 2021-07-15

## 2021-07-15 ENCOUNTER — TELEPHONE (OUTPATIENT)
Dept: GASTROENTEROLOGY | Facility: CLINIC | Age: 71
End: 2021-07-15

## 2021-07-15 ENCOUNTER — TELEPHONE (OUTPATIENT)
Dept: FAMILY MEDICINE CLINIC | Facility: CLINIC | Age: 71
End: 2021-07-15

## 2021-07-15 PROCEDURE — 85027 COMPLETE CBC AUTOMATED: CPT | Performed by: NURSE PRACTITIONER

## 2021-07-15 PROCEDURE — 84466 ASSAY OF TRANSFERRIN: CPT | Performed by: NURSE PRACTITIONER

## 2021-07-15 PROCEDURE — 82728 ASSAY OF FERRITIN: CPT | Performed by: NURSE PRACTITIONER

## 2021-07-15 PROCEDURE — 83540 ASSAY OF IRON: CPT | Performed by: NURSE PRACTITIONER

## 2021-07-15 NOTE — TELEPHONE ENCOUNTER
----- Message from Chana Huerta sent at 7/15/2021 11:03 AM EDT -----  Regarding: CALLBACK / SYMPTOMS  Contact: [phone]  PT wife Wanda called on PT's behalf stating that her  is possibly bleeding internally. She stated that she has left voicemail and would like for someone to call her back as soon as possible? Thank you!

## 2021-07-15 NOTE — TELEPHONE ENCOUNTER
Returned phone call to spouse. She states the patient has been experiencing breakthrough abdominal pain.   States he is taking his Fe+ faithfully along with vitamin C. When asked if he is taking it with food? She states he was told not to. She states she recently stopped his vitamin C to see if it would help with the pain and it did not.   She states this morning the patient passed black diarrhea stool and then had a second BM which was more formed and it wasn't as black. She states the patient is feeling better and is out mowing the lawn now.   Advised her to have the patient go to [facility] out patient lab to get an Fe+, CBC and Ferritin level drawn today.   She states she will tell the patient.   Advised will send an update to [place]. She verb understanding.

## 2021-07-15 NOTE — TELEPHONE ENCOUNTER
Caller: Wanda Bolaños    Relationship: Emergency Contact    Best call back number: [phone] (H) [phone] (C)    What orders are you requesting (i.e. lab or imaging): ORDER FOR LABS- BLEEDING ULCER     In what timeframe would the patient need to come in: TODAY    Where will you receive your lab/imaging services:     Additional notes: PATIENT'S WIFE WANDA CALLED TO REQUEST AN ORDER FOR PATIENT TO HAVE SOME LABS DRAWN DUE TO PATIENT HAVING THE SAME TYPE OF SYMPTOMS AS BEFORE WITH HIS BLEEDING ULCER.     PLEASE CONTACT PATIENT'S WIFE WANDA TO ADVISE.     THANKS

## 2021-07-15 NOTE — TELEPHONE ENCOUNTER
Hub to share:     Left pt wife message that his GI specialist has a my chart message in his chart to repeat labs after 7/11. The orders are in his chart and can be drawn at the hospital or they said can schedule a lab appointment with his GI office to have drawn.

## 2021-07-16 NOTE — TELEPHONE ENCOUNTER
Kimo Guillory: Let Perez know that his lab work shows anemia with a hemoglobin of 11.8 but much improved from 1 month ago.  Iron stores are slowly improving as well.  Continue iron supplementation and keep follow-up with Dr. Slade.

## 2021-07-21 ENCOUNTER — OUTSIDE FACILITY SERVICE (OUTPATIENT)
Dept: GASTROENTEROLOGY | Facility: CLINIC | Age: 71
End: 2021-07-21

## 2021-07-21 PROCEDURE — 43450 DILATE ESOPHAGUS 1/MULT PASS: CPT | Performed by: INTERNAL MEDICINE

## 2021-07-21 PROCEDURE — 43235 EGD DIAGNOSTIC BRUSH WASH: CPT | Performed by: INTERNAL MEDICINE

## 2021-08-02 ENCOUNTER — TELEPHONE (OUTPATIENT)
Dept: FAMILY MEDICINE CLINIC | Facility: CLINIC | Age: 71
End: 2021-08-02

## 2021-08-02 NOTE — TELEPHONE ENCOUNTER
Caller: Perez Bolaños    Relationship: Self    Best call back number: [phone]    Medication needed: pravastatin (PRAVACHOL) 20 MG tablet    What is the patient's preferred pharmacy:     [facility] - [place] [address] [facility], [address] - [phone] [facility] [phone] FX

## 2021-08-02 NOTE — TELEPHONE ENCOUNTER
There is currently a refill request for patient that needs to be signed.  I will look up for pt to get approved.

## 2021-08-09 RX ORDER — PRAVASTATIN SODIUM 20 MG
20 TABLET ORAL NIGHTLY
Qty: 90 TABLET | Refills: 3 | Status: SHIPPED | OUTPATIENT
Start: 2021-08-09 | End: 2021-08-09 | Stop reason: SDUPTHER

## 2021-08-09 RX ORDER — PRAVASTATIN SODIUM 20 MG
20 TABLET ORAL NIGHTLY
Qty: 7 TABLET | Refills: 0 | Status: SHIPPED | OUTPATIENT
Start: 2021-08-09 | End: 2021-12-11 | Stop reason: HOSPADM

## 2021-08-09 NOTE — TELEPHONE ENCOUNTER
Pt wife states he is completely out, would like a week supply sent to local [facility] until mail order arrives.

## 2021-09-15 DIAGNOSIS — Z87.11 HISTORY OF GASTRIC ULCER: ICD-10-CM

## 2021-09-15 RX ORDER — PANTOPRAZOLE SODIUM 40 MG/1
TABLET, DELAYED RELEASE ORAL
Qty: 90 TABLET | Refills: 3 | Status: SHIPPED | OUTPATIENT
Start: 2021-09-15 | End: 2022-08-26

## 2021-09-23 DIAGNOSIS — Z87.19 H/O: UPPER GI BLEED: ICD-10-CM

## 2021-09-23 DIAGNOSIS — Z51.81 MEDICATION MONITORING ENCOUNTER: ICD-10-CM

## 2021-09-23 DIAGNOSIS — R71.0 PRECIPITOUS DROP IN HEMATOCRIT: ICD-10-CM

## 2021-09-23 DIAGNOSIS — R79.89 LOW TESTOSTERONE LEVEL IN MALE: ICD-10-CM

## 2021-09-23 DIAGNOSIS — E55.9 VITAMIN D DEFICIENCY: ICD-10-CM

## 2021-09-23 DIAGNOSIS — E78.2 MIXED HYPERLIPIDEMIA: ICD-10-CM

## 2021-09-23 DIAGNOSIS — Z79.899 ENCOUNTER FOR MONITORING STATIN THERAPY: ICD-10-CM

## 2021-09-23 DIAGNOSIS — E78.2 MIXED HYPERLIPIDEMIA: Primary | ICD-10-CM

## 2021-09-23 DIAGNOSIS — R94.6 ABNORMAL RESULTS OF THYROID FUNCTION STUDIES: ICD-10-CM

## 2021-09-23 DIAGNOSIS — Z51.81 ENCOUNTER FOR MONITORING STATIN THERAPY: ICD-10-CM

## 2021-09-23 DIAGNOSIS — Z12.5 SCREENING FOR PROSTATE CANCER: ICD-10-CM

## 2021-09-30 ENCOUNTER — OFFICE VISIT (OUTPATIENT)
Dept: FAMILY MEDICINE CLINIC | Facility: CLINIC | Age: 71
End: 2021-09-30

## 2021-09-30 VITALS
DIASTOLIC BLOOD PRESSURE: 80 MMHG | HEIGHT: 70 IN | OXYGEN SATURATION: 95 % | HEART RATE: 57 BPM | WEIGHT: 186 LBS | SYSTOLIC BLOOD PRESSURE: 150 MMHG | TEMPERATURE: 98.2 F | BODY MASS INDEX: 26.63 KG/M2

## 2021-09-30 DIAGNOSIS — Z00.00 MEDICARE ANNUAL WELLNESS VISIT, SUBSEQUENT: Primary | ICD-10-CM

## 2021-09-30 DIAGNOSIS — Z79.899 ENCOUNTER FOR MONITORING STATIN THERAPY: ICD-10-CM

## 2021-09-30 DIAGNOSIS — R71.0 PRECIPITOUS DROP IN HEMATOCRIT: ICD-10-CM

## 2021-09-30 DIAGNOSIS — Z51.81 ENCOUNTER FOR MONITORING STATIN THERAPY: ICD-10-CM

## 2021-09-30 DIAGNOSIS — Z87.11 HISTORY OF GASTRIC ULCER: ICD-10-CM

## 2021-09-30 DIAGNOSIS — E78.2 MIXED HYPERLIPIDEMIA: ICD-10-CM

## 2021-09-30 PROBLEM — R79.89 LOW TESTOSTERONE LEVEL IN MALE: Status: RESOLVED | Noted: 2017-09-13 | Resolved: 2021-09-30

## 2021-09-30 LAB
25(OH)D3+25(OH)D2 SERPL-MCNC: 69.2 NG/ML (ref 30–100)
ALT SERPL-CCNC: 21 U/L (ref 1–41)
APPEARANCE UR: CLEAR
BILIRUB UR QL STRIP: NEGATIVE
BUN SERPL-MCNC: 14 MG/DL (ref 8–23)
BUN/CREAT SERPL: 12 (ref 7–25)
CALCIUM SERPL-MCNC: 9.3 MG/DL (ref 8.6–10.5)
CHLORIDE SERPL-SCNC: 104 MMOL/L (ref 98–107)
CHOLEST SERPL-MCNC: 127 MG/DL (ref 0–200)
CO2 SERPL-SCNC: 23.6 MMOL/L (ref 22–29)
COLOR UR: YELLOW
CREAT SERPL-MCNC: 1.17 MG/DL (ref 0.76–1.27)
ERYTHROCYTE [DISTWIDTH] IN BLOOD BY AUTOMATED COUNT: 14.7 % (ref 12.3–15.4)
GLUCOSE SERPL-MCNC: 94 MG/DL (ref 65–99)
GLUCOSE UR QL: NEGATIVE
HCT VFR BLD AUTO: 41.4 % (ref 37.5–51)
HDLC SERPL-MCNC: 51 MG/DL (ref 40–60)
HGB BLD-MCNC: 13.1 G/DL (ref 13–17.7)
HGB UR QL STRIP: NEGATIVE
IRON SATN MFR SERPL: 11 % (ref 20–50)
IRON SERPL-MCNC: 47 MCG/DL (ref 59–158)
KETONES UR QL STRIP: ABNORMAL
LDLC SERPL CALC-MCNC: 62 MG/DL (ref 0–100)
LEUKOCYTE ESTERASE UR QL STRIP: NEGATIVE
MCH RBC QN AUTO: 26.8 PG (ref 26.6–33)
MCHC RBC AUTO-ENTMCNC: 31.6 G/DL (ref 31.5–35.7)
MCV RBC AUTO: 84.8 FL (ref 79–97)
NITRITE UR QL STRIP: NEGATIVE
PH UR STRIP: 6.5 [PH] (ref 5–8)
PLATELET # BLD AUTO: 225 10*3/MM3 (ref 140–450)
POTASSIUM SERPL-SCNC: 4.6 MMOL/L (ref 3.5–5.2)
PROT UR QL STRIP: NEGATIVE
PSA SERPL-MCNC: 0.45 NG/ML (ref 0–4)
RBC # BLD AUTO: 4.88 10*6/MM3 (ref 4.14–5.8)
SODIUM SERPL-SCNC: 140 MMOL/L (ref 136–145)
SP GR UR: 1.01 (ref 1–1.03)
TESTOST FREE SERPL-MCNC: 13.1 PG/ML (ref 6.6–18.1)
TESTOST SERPL-MCNC: 723 NG/DL (ref 264–916)
TIBC SERPL-MCNC: 411 MCG/DL
TRIGL SERPL-MCNC: 67 MG/DL (ref 0–150)
TSH SERPL DL<=0.005 MIU/L-ACNC: 1.29 UIU/ML (ref 0.27–4.2)
UIBC SERPL-MCNC: 364 MCG/DL (ref 112–346)
UROBILINOGEN UR STRIP-MCNC: ABNORMAL MG/DL
VLDLC SERPL CALC-MCNC: 14 MG/DL (ref 5–40)
WBC # BLD AUTO: 7.14 10*3/MM3 (ref 3.4–10.8)

## 2021-09-30 PROCEDURE — 1126F AMNT PAIN NOTED NONE PRSNT: CPT | Performed by: FAMILY MEDICINE

## 2021-09-30 PROCEDURE — 1170F FXNL STATUS ASSESSED: CPT | Performed by: FAMILY MEDICINE

## 2021-09-30 PROCEDURE — 1160F RVW MEDS BY RX/DR IN RCRD: CPT | Performed by: FAMILY MEDICINE

## 2021-09-30 PROCEDURE — G0439 PPPS, SUBSEQ VISIT: HCPCS | Performed by: FAMILY MEDICINE

## 2021-09-30 NOTE — PROGRESS NOTES
The ABCs of the Annual Wellness Visit  Subsequent Medicare Wellness Visit    Chief Complaint   Patient presents with   • Medicare Wellness-subsequent      Subjective    History of Present Illness:  Perez Bolaños is a 71 y.o. male who presents for a Subsequent Medicare Wellness Visit.    The following portions of the patient's history were reviewed and   updated as appropriate: allergies, current medications, past family history, past medical history, past social history, past surgical history and problem list.    Compared to one year ago, the patient feels his physical   health is the same.    Compared to one year ago, the patient feels his mental   health is the same.    Recent Hospitalizations:  This patient has had a [facility] admission record on file within the last 365 days.    Current Medical Providers:  Patient Care Team:  Nav Carmona MD as PCP - General (Family Medicine)  Staica, Harvinder Awan MD as Consulting Physician (Gastroenterology)  Tyler Jean MD as Consulting Physician (Orthopedic Surgery)    Outpatient Medications Prior to Visit   Medication Sig Dispense Refill   • CHLORPHENIRAMINE MALEATE PO Take 4 mg by mouth As Needed.     • clomiPHENE (CLOMID) 50 MG tablet Take 1 tablet by mouth Daily. 90 tablet 1   • magnesium oxide (MAG-OX) 400 MG tablet Take 120 mg by mouth Daily.     • Multiple Vitamins-Minerals (MULTIVITAMIN WITH MINERALS) tablet tablet Take 1 tablet by mouth Daily.     • pantoprazole (PROTONIX) 40 MG EC tablet TAKE ONE TABLET BY MOUTH DAILY 90 tablet 3   • pravastatin (PRAVACHOL) 20 MG tablet Take 1 tablet by mouth Every Night. Indications: High Amount of Fats in the Blood 7 tablet 0   • Probiotic Product (PROBIOTIC ADVANCED PO) Take 1 tablet by mouth Every Morning.     • sildenafil (REVATIO) 20 MG tablet Take 1 tablet by mouth Daily As Needed (ED). 30 tablet 5   • ferrous gluconate (FERGON) 324 MG tablet Take 1 tablet by mouth Daily With Breakfast. 90  "tablet 0     No facility-administered medications prior to visit.       No opioid medication identified on active medication list. I have reviewed chart for other potential  high risk medication/s and harmful drug interactions in the elderly.          Aspirin is not on active medication list.  Aspirin use is contraindicated for this patient due to: history of bleeding.  .    Patient Active Problem List   Diagnosis   • Sleep apnea   • MI (mitral incompetence)   • Seasonal allergies   • Mixed hyperlipidemia   • Constipation   • Arthritis   • Screening for prostate cancer   • Encounter for monitoring statin therapy   • Vitamin D deficiency   • Medicare annual wellness visit, subsequent   • Routine adult health maintenance   • Umbilical hernia without obstruction and without gangrene   • History of gastric ulcer     Advance Care Planning  Advance Directive is not on file.  ACP discussion was held with the patient during this visit. Patient has an advance directive (not in EMR), copy requested.          Objective    Vitals:    09/30/21 1041   BP: 150/80   BP Location: Left arm   Patient Position: Sitting   Cuff Size: Adult   Pulse: 57   Temp: 98.2 °F (36.8 °C)   TempSrc: Temporal   SpO2: 95%   Weight: 84.4 kg (186 lb)   Height: 177.8 cm (70\")     BMI Readings from Last 1 Encounters:   09/30/21 26.69 kg/m²   BMI is above normal parameters. Recommendations include: nutrition counseling    Does the patient have evidence of cognitive impairment? No    Physical Exam  Cardiovascular:      Rate and Rhythm: Normal rate.   Pulmonary:      Effort: Pulmonary effort is normal.   Abdominal:      Tenderness: There is no abdominal tenderness.   Neurological:      Mental Status: He is alert.       Lab Results   Component Value Date    GLU 94 09/24/2021    CHLPL 127 09/24/2021    TRIG 67 09/24/2021    HDL 51 09/24/2021    LDL 62 09/24/2021    VLDL 14 09/24/2021            HEALTH RISK ASSESSMENT    Smoking Status:  Social History "     Tobacco Use   Smoking Status Never Smoker   Smokeless Tobacco Never Used     Alcohol Consumption:  Social History     Substance and Sexual Activity   Alcohol Use Yes   • Alcohol/week: 2.0 - 3.0 standard drinks   • Types: 2 - 3 Cans of beer per week    Comment: weekend     Fall Risk Screen:    EVELIA Fall Risk Assessment was completed, and patient is at LOW risk for falls.Assessment completed on:9/30/2021    Depression Screening:  PHQ-2/PHQ-9 Depression Screening 9/30/2021   Little interest or pleasure in doing things 0   Feeling down, depressed, or hopeless 0   Trouble falling or staying asleep, or sleeping too much -   Feeling tired or having little energy -   Poor appetite or overeating -   Feeling bad about yourself - or that you are a failure or have let yourself or your family down -   Trouble concentrating on things, such as reading the newspaper or watching television -   Moving or speaking so slowly that other people could have noticed. Or the opposite - being so fidgety or restless that you have been moving around a lot more than usual -   Thoughts that you would be better off dead, or of hurting yourself in some way -   Total Score 0       Health Habits and Functional and Cognitive Screening:  Functional & Cognitive Status 9/30/2021   Do you have difficulty preparing food and eating? No   Do you have difficulty bathing yourself, getting dressed or grooming yourself? No   Do you have difficulty using the toilet? No   Do you have difficulty moving around from place to place? No   Do you have trouble with steps or getting out of a bed or a chair? No   Current Diet Well Balanced Diet   Dental Exam Not up to date   Eye Exam Up to date   Exercise (times per week) 7 times per week   Current Exercises Include Treadmill   Current Exercise Activities Include -   Do you need help using the phone?  No   Are you deaf or do you have serious difficulty hearing?  Yes   Do you need help with transportation? No   Do you  need help shopping? No   Do you need help preparing meals?  -   Do you need help with housework?  No   Do you need help with laundry? No   Do you need help taking your medications? No   Do you need help managing money? No   Do you ever drive or ride in a car without wearing a seat belt? No   Have you felt unusual stress, anger or loneliness in the last month? No   Who do you live with? Spouse   If you need help, do you have trouble finding someone available to you? No   Have you been bothered in the last four weeks by sexual problems? No   Do you have difficulty concentrating, remembering or making decisions? No       Age-appropriate Screening Schedule:  Refer to the list below for future screening recommendations based on patient's age, sex and/or medical conditions. Orders for these recommended tests are listed in the plan section. The patient has been provided with a written plan.    Health Maintenance   Topic Date Due   • ZOSTER VACCINE (1 of 2) 09/30/2021 (Originally 4/13/1998)   • INFLUENZA VACCINE  10/01/2021   • LIPID PANEL  09/24/2022   • TDAP/TD VACCINES (2 - Tdap) 08/19/2028              Assessment/Plan   CMS Preventative Services Quick Reference  Risk Factors Identified During Encounter  None Identified  The above risks/problems have been discussed with the patient.  Follow up actions/plans if indicated are seen below in the Assessment/Plan Section.  Pertinent information has been shared with the patient in the After Visit Summary.    Diagnoses and all orders for this visit:    1. Medicare annual wellness visit, subsequent (Primary)    2. History of gastric ulcer  -     Iron and TIBC; Future  -     Ferritin; Future  -     Hemoglobin & Hematocrit, Blood; Future    3. Mixed hyperlipidemia    4. Encounter for monitoring statin therapy  -     ALT; Future    5. Precipitous drop in hematocrit   -     Iron and TIBC; Future  -     Ferritin; Future    His iron has improved as well as his hemoglobin.  But not to  goal.  This was due to his upper GI gastric bleed.  Asked him to continue the iron for 1 more month.  Handout with a diet that is rich in iron was given.      Follow Up:   Return in about 6 months (around 3/30/2022).     An After Visit Summary and PPPS were made available to the patient.

## 2021-11-30 ENCOUNTER — APPOINTMENT (OUTPATIENT)
Dept: CT IMAGING | Facility: HOSPITAL | Age: 71
End: 2021-11-30

## 2021-11-30 ENCOUNTER — APPOINTMENT (OUTPATIENT)
Dept: GENERAL RADIOLOGY | Facility: HOSPITAL | Age: 71
End: 2021-11-30

## 2021-11-30 ENCOUNTER — HOSPITAL ENCOUNTER (EMERGENCY)
Facility: HOSPITAL | Age: 71
Discharge: HOME | End: 2021-11-30
Attending: EMERGENCY MEDICINE | Admitting: EMERGENCY MEDICINE

## 2021-11-30 VITALS
BODY MASS INDEX: 25.25 KG/M2 | WEIGHT: 176.4 LBS | RESPIRATION RATE: 18 BRPM | SYSTOLIC BLOOD PRESSURE: 126 MMHG | HEART RATE: 84 BPM | HEIGHT: 70 IN | DIASTOLIC BLOOD PRESSURE: 73 MMHG | OXYGEN SATURATION: 92 % | TEMPERATURE: 100 F

## 2021-11-30 DIAGNOSIS — J12.82 PNEUMONIA DUE TO COVID-19 VIRUS: Primary | ICD-10-CM

## 2021-11-30 DIAGNOSIS — U07.1 PNEUMONIA DUE TO COVID-19 VIRUS: Primary | ICD-10-CM

## 2021-11-30 LAB
ALBUMIN SERPL-MCNC: 4 G/DL (ref 3.5–5.2)
ALBUMIN/GLOB SERPL: 1.2 G/DL
ALP SERPL-CCNC: 51 U/L (ref 39–117)
ALT SERPL W P-5'-P-CCNC: 235 U/L (ref 1–41)
ANION GAP SERPL CALCULATED.3IONS-SCNC: 11.6 MMOL/L (ref 5–15)
AST SERPL-CCNC: 145 U/L (ref 1–40)
BACTERIA UR QL AUTO: ABNORMAL /HPF
BASOPHILS # BLD AUTO: 0.01 10*3/MM3 (ref 0–0.2)
BASOPHILS NFR BLD AUTO: 0.2 % (ref 0–1.5)
BILIRUB SERPL-MCNC: 0.2 MG/DL (ref 0–1.2)
BILIRUB UR QL STRIP: NEGATIVE
BUN SERPL-MCNC: 18 MG/DL (ref 8–23)
BUN/CREAT SERPL: 13.5 (ref 7–25)
CALCIUM SPEC-SCNC: 9.1 MG/DL (ref 8.6–10.5)
CHLORIDE SERPL-SCNC: 93 MMOL/L (ref 98–107)
CLARITY UR: CLEAR
CO2 SERPL-SCNC: 23.4 MMOL/L (ref 22–29)
COLOR UR: YELLOW
CREAT SERPL-MCNC: 1.33 MG/DL (ref 0.76–1.27)
D DIMER PPP FEU-MCNC: 2.73 MCGFEU/ML (ref 0–0.46)
D-LACTATE SERPL-SCNC: 2.2 MMOL/L (ref 0.5–2)
DEPRECATED RDW RBC AUTO: 49.4 FL (ref 37–54)
EOSINOPHIL # BLD AUTO: 0 10*3/MM3 (ref 0–0.4)
EOSINOPHIL NFR BLD AUTO: 0 % (ref 0.3–6.2)
ERYTHROCYTE [DISTWIDTH] IN BLOOD BY AUTOMATED COUNT: 15.8 % (ref 12.3–15.4)
FLUAV RNA RESP QL NAA+PROBE: NOT DETECTED
FLUBV RNA RESP QL NAA+PROBE: NOT DETECTED
GFR SERPL CREATININE-BSD FRML MDRD: 53 ML/MIN/1.73
GLOBULIN UR ELPH-MCNC: 3.3 GM/DL
GLUCOSE SERPL-MCNC: 139 MG/DL (ref 65–99)
GLUCOSE UR STRIP-MCNC: NEGATIVE MG/DL
HCT VFR BLD AUTO: 44.2 % (ref 37.5–51)
HGB BLD-MCNC: 14.8 G/DL (ref 13–17.7)
HGB UR QL STRIP.AUTO: ABNORMAL
HYALINE CASTS UR QL AUTO: ABNORMAL /LPF
IMM GRANULOCYTES # BLD AUTO: 0.03 10*3/MM3 (ref 0–0.05)
IMM GRANULOCYTES NFR BLD AUTO: 0.6 % (ref 0–0.5)
KETONES UR QL STRIP: NEGATIVE
LEUKOCYTE ESTERASE UR QL STRIP.AUTO: NEGATIVE
LYMPHOCYTES # BLD AUTO: 0.33 10*3/MM3 (ref 0.7–3.1)
LYMPHOCYTES NFR BLD AUTO: 6.9 % (ref 19.6–45.3)
MCH RBC QN AUTO: 28.6 PG (ref 26.6–33)
MCHC RBC AUTO-ENTMCNC: 33.5 G/DL (ref 31.5–35.7)
MCV RBC AUTO: 85.3 FL (ref 79–97)
MONOCYTES # BLD AUTO: 0.26 10*3/MM3 (ref 0.1–0.9)
MONOCYTES NFR BLD AUTO: 5.4 % (ref 5–12)
NEUTROPHILS NFR BLD AUTO: 4.16 10*3/MM3 (ref 1.7–7)
NEUTROPHILS NFR BLD AUTO: 86.9 % (ref 42.7–76)
NITRITE UR QL STRIP: NEGATIVE
NRBC BLD AUTO-RTO: 0 /100 WBC (ref 0–0.2)
NT-PROBNP SERPL-MCNC: 87.6 PG/ML (ref 0–900)
PH UR STRIP.AUTO: 5.5 [PH] (ref 4.5–8)
PLATELET # BLD AUTO: 168 10*3/MM3 (ref 140–450)
PMV BLD AUTO: 10.5 FL (ref 6–12)
POTASSIUM SERPL-SCNC: 4.2 MMOL/L (ref 3.5–5.2)
PROCALCITONIN SERPL-MCNC: 0.22 NG/ML (ref 0–0.25)
PROT SERPL-MCNC: 7.3 G/DL (ref 6–8.5)
PROT UR QL STRIP: ABNORMAL
QT INTERVAL: 378 MS
RBC # BLD AUTO: 5.18 10*6/MM3 (ref 4.14–5.8)
RBC # UR STRIP: ABNORMAL /HPF
REF LAB TEST METHOD: ABNORMAL
S PYO AG THROAT QL: NEGATIVE
SARS-COV-2 RNA RESP QL NAA+PROBE: DETECTED
SODIUM SERPL-SCNC: 128 MMOL/L (ref 136–145)
SP GR UR STRIP: 1.03 (ref 1–1.03)
SQUAMOUS #/AREA URNS HPF: ABNORMAL /HPF
TROPONIN T SERPL-MCNC: <0.01 NG/ML (ref 0–0.03)
UROBILINOGEN UR QL STRIP: ABNORMAL
WBC # UR STRIP: ABNORMAL /HPF
WBC NRBC COR # BLD: 4.79 10*3/MM3 (ref 3.4–10.8)

## 2021-11-30 PROCEDURE — 87880 STREP A ASSAY W/OPTIC: CPT | Performed by: EMERGENCY MEDICINE

## 2021-11-30 PROCEDURE — 80053 COMPREHEN METABOLIC PANEL: CPT | Performed by: EMERGENCY MEDICINE

## 2021-11-30 PROCEDURE — 87081 CULTURE SCREEN ONLY: CPT | Performed by: EMERGENCY MEDICINE

## 2021-11-30 PROCEDURE — 93010 ELECTROCARDIOGRAM REPORT: CPT | Performed by: INTERNAL MEDICINE

## 2021-11-30 PROCEDURE — 71275 CT ANGIOGRAPHY CHEST: CPT

## 2021-11-30 PROCEDURE — 83880 ASSAY OF NATRIURETIC PEPTIDE: CPT | Performed by: EMERGENCY MEDICINE

## 2021-11-30 PROCEDURE — 99285 EMERGENCY DEPT VISIT HI MDM: CPT | Performed by: EMERGENCY MEDICINE

## 2021-11-30 PROCEDURE — 25010000002 DEXAMETHASONE: Performed by: EMERGENCY MEDICINE

## 2021-11-30 PROCEDURE — 71045 X-RAY EXAM CHEST 1 VIEW: CPT

## 2021-11-30 PROCEDURE — 85025 COMPLETE CBC W/AUTO DIFF WBC: CPT | Performed by: EMERGENCY MEDICINE

## 2021-11-30 PROCEDURE — 87040 BLOOD CULTURE FOR BACTERIA: CPT | Performed by: EMERGENCY MEDICINE

## 2021-11-30 PROCEDURE — 0 IOPAMIDOL PER 1 ML: Performed by: EMERGENCY MEDICINE

## 2021-11-30 PROCEDURE — 87636 SARSCOV2 & INF A&B AMP PRB: CPT | Performed by: EMERGENCY MEDICINE

## 2021-11-30 PROCEDURE — 84484 ASSAY OF TROPONIN QUANT: CPT | Performed by: EMERGENCY MEDICINE

## 2021-11-30 PROCEDURE — 93005 ELECTROCARDIOGRAM TRACING: CPT | Performed by: EMERGENCY MEDICINE

## 2021-11-30 PROCEDURE — 83605 ASSAY OF LACTIC ACID: CPT | Performed by: EMERGENCY MEDICINE

## 2021-11-30 PROCEDURE — 99284 EMERGENCY DEPT VISIT MOD MDM: CPT

## 2021-11-30 PROCEDURE — 81001 URINALYSIS AUTO W/SCOPE: CPT | Performed by: EMERGENCY MEDICINE

## 2021-11-30 PROCEDURE — 96374 THER/PROPH/DIAG INJ IV PUSH: CPT

## 2021-11-30 PROCEDURE — 36415 COLL VENOUS BLD VENIPUNCTURE: CPT

## 2021-11-30 PROCEDURE — 85379 FIBRIN DEGRADATION QUANT: CPT | Performed by: EMERGENCY MEDICINE

## 2021-11-30 PROCEDURE — 84145 PROCALCITONIN (PCT): CPT | Performed by: EMERGENCY MEDICINE

## 2021-11-30 RX ORDER — DEXAMETHASONE 4 MG/1
6 TABLET ORAL 3 TIMES DAILY
Qty: 10 TABLET | Refills: 0 | Status: SHIPPED | OUTPATIENT
Start: 2021-11-30 | End: 2021-12-11 | Stop reason: HOSPADM

## 2021-11-30 RX ORDER — DEXAMETHASONE SODIUM PHOSPHATE 4 MG/ML
INJECTION, SOLUTION INTRA-ARTICULAR; INTRALESIONAL; INTRAMUSCULAR; INTRAVENOUS; SOFT TISSUE
Status: DISCONTINUED
Start: 2021-11-30 | End: 2021-12-01 | Stop reason: HOSPADM

## 2021-11-30 RX ADMIN — DEXAMETHASONE SODIUM PHOSPHATE 10 MG: 10 INJECTION INTRAMUSCULAR; INTRAVENOUS at 18:46

## 2021-11-30 RX ADMIN — SODIUM CHLORIDE 1000 ML: 9 INJECTION, SOLUTION INTRAVENOUS at 19:57

## 2021-11-30 RX ADMIN — IOPAMIDOL 100 ML: 755 INJECTION, SOLUTION INTRAVENOUS at 20:16

## 2021-12-01 ENCOUNTER — HOSPITAL ENCOUNTER (OUTPATIENT)
Dept: INFUSION THERAPY | Facility: HOSPITAL | Age: 71
Setting detail: INFUSION SERIES
Discharge: HOME | End: 2021-12-01

## 2021-12-01 ENCOUNTER — TELEPHONE (OUTPATIENT)
Dept: FAMILY MEDICINE CLINIC | Facility: CLINIC | Age: 71
End: 2021-12-01

## 2021-12-01 VITALS
OXYGEN SATURATION: 94 % | RESPIRATION RATE: 18 BRPM | HEART RATE: 73 BPM | TEMPERATURE: 99.5 F | DIASTOLIC BLOOD PRESSURE: 71 MMHG | SYSTOLIC BLOOD PRESSURE: 120 MMHG

## 2021-12-01 PROCEDURE — M0243 CASIRIVI AND IMDEVI INFUSION: HCPCS | Performed by: EMERGENCY MEDICINE

## 2021-12-01 PROCEDURE — 25010000002 INJECTION, CASIRIVIMAB AND IMDEVIMAB, 1200 MG: Performed by: EMERGENCY MEDICINE

## 2021-12-01 RX ORDER — DIPHENHYDRAMINE HCL 25 MG
50 CAPSULE ORAL ONCE AS NEEDED
Status: DISCONTINUED | OUTPATIENT
Start: 2021-12-01 | End: 2021-12-02

## 2021-12-01 RX ORDER — DIPHENHYDRAMINE HYDROCHLORIDE 50 MG/ML
50 INJECTION INTRAMUSCULAR; INTRAVENOUS ONCE AS NEEDED
Status: DISCONTINUED | OUTPATIENT
Start: 2021-12-01 | End: 2021-12-02

## 2021-12-01 RX ADMIN — IMDEVIMAB: 300 INJECTION, SOLUTION, CONCENTRATE INTRAVENOUS at 16:00

## 2021-12-01 NOTE — ED NOTES
Pt's wife Wanda ([phone]) called per her request for status update on patient. All questions answered.      Lorna Tucker, RN  11/30/21 [id number]

## 2021-12-01 NOTE — ED NOTES
Pt's wife Wanda updated on patient status via phone. Will call when patient ready to be discharged.     Lorna Tucker, RN  11/30/21 [id number]

## 2021-12-01 NOTE — NURSING NOTE
"Pt arrived to [facility] for SQ regeneron per MD order. Pt given handout titled, \"Fact Sheet for Patients, Parents and Caregivers: Emergency Use Authorization of Regen-cov\" prior to administration of SQ regeneron. RN educated pt on injection process, to not rcv any covid vaccine for 90 days, to go to ER for any issues with worsening breathing and to call PCP if symptoms are not improving. Pt vu. Pt denies any questions at this time.     Regeneron given x4 injections consecutively, each at a different injection sites. See MAR for additional administration information. Pt tolerated each injection without any issues. Pt instructed to remain in room for 1 hour for post monitoring period. Call light within reach.    1705- Post monitoring complete. VS obtained and documented. No change in pt assessment. AVS given to pt. Pt escorted to private entrance and discharged in stable condition.     "

## 2021-12-01 NOTE — ED NOTES
Dr Carmona called per Dr. Fatima. Voicemail left.      Lorna Tucker, RN  11/30/21 2118       Lorna Tucker, RN  11/30/21 2122

## 2021-12-01 NOTE — TELEPHONE ENCOUNTER
Mr. Bolaños has current COVID-19 infection with bilateral pneumonia.  He was in [facility] ER last night.  I spoke with Dr. Fatiam who saw him as his attending last night.  Was released from the ER last night to go home.  He will go to [facility] outpatient today for the monoclonal antibody injections.    Can we please set up a MyChart video visit with him on Thursday      Nav Carmona MD

## 2021-12-01 NOTE — DISCHARGE INSTRUCTIONS
Follow-up tomorrow with the monoclonal antibiotic clinic.  They will call you tomorrow morning for an appointment.  Call them if they have not called you by tomorrow afternoon.  If your home pulse ox reads below 90%, particular if you are more short of breath or worse in any way at all, return to the emergency department.

## 2021-12-02 ENCOUNTER — TELEMEDICINE (OUTPATIENT)
Dept: FAMILY MEDICINE CLINIC | Facility: CLINIC | Age: 71
End: 2021-12-02

## 2021-12-02 ENCOUNTER — READMISSION MANAGEMENT (OUTPATIENT)
Dept: CALL CENTER | Facility: HOSPITAL | Age: 71
End: 2021-12-02

## 2021-12-02 DIAGNOSIS — U07.1 COVID-19 VIRUS INFECTION: Primary | ICD-10-CM

## 2021-12-02 LAB — BACTERIA SPEC AEROBE CULT: NORMAL

## 2021-12-02 PROCEDURE — 99213 OFFICE O/P EST LOW 20 MIN: CPT | Performed by: FAMILY MEDICINE

## 2021-12-02 NOTE — OUTREACH NOTE
Prep Survey      Responses   [facility] facility patient discharged from? [place]   Is LACE score < 7 ? No   Emergency Room discharge w/ pulse ox? Yes   Eligibility Readm Mgmt   Discharge diagnosis PN r/t Covid 19   Does the patient have one of the following disease processes/diagnoses(primary or secondary)? COVID-19   Does the patient have Home health ordered? No   Is there a DME ordered? Yes   What DME was ordered? Sent home w/ pulse ox   General alerts for this patient ED discharge - call x 3 only   Prep survey completed? Yes          Brittney Milian RN

## 2021-12-02 NOTE — OUTREACH NOTE
COVID-19 Week 1 Survey      Responses   [facility] patient discharged from? [place]   Does the patient have one of the following disease processes/diagnoses(primary or secondary)? COVID-19   COVID-19 underlying condition? None   Call Number Call 1   Week 1 Call successful? Yes   Call start time 1036   Call end time 1041   Discharge diagnosis PN r/t Covid 19   Is patient permission given to speak with other caregiver? Yes   Person spoke with today (if not patient) and relationship Wanda-Spouse   Meds reviewed with patient/caregiver? Yes   Is the patient having any side effects they believe may be caused by any medication additions or changes? No   Does the patient have all medications ordered at discharge? Yes   Is the patient taking all medications as directed (includes completed medication regime)? Yes   Does the patient have a primary care provider?  Yes   Comments regarding PCP telehealth appt. 12/2/21   Does the patient have an appointment with their PCP or specialist within 7 days of discharge? Yes   Has the patient kept scheduled appointments due by today? Yes   What DME was ordered? Per Spouse-patient did not receive pulse-ox from hospital   Psychosocial issues? No   Did the patient receive a copy of their discharge instructions? Yes   Did the patient receive a copy of COVID-19 specific instructions? Yes   Nursing interventions Reviewed instructions with patient   What is the patient's perception of their health status since discharge? Same   Does the patient have any of the following symptoms? Shortness of breath,  Cough,  Fever/chills,  Loss of taste/smell  [Diminished taste/smell, nausea]   Nursing Interventions Nurse provided patient education   Pulse Ox monitoring None   Is the patient/caregiver able to teach back steps to recovery at home? Set small, achievable goals for return to baseline health,  Rest and rebuild strength, gradually increase activity,  Eat a well-balance diet   If the patient  is a current smoker, are they able to teach back resources for cessation? Not a smoker   Is the patient/caregiver able to teach back the hierarchy of who to call/visit for symptoms/problems? PCP, Specialist, Home health nurse, Urgent Care, ED, 911 Yes   COVID-19 call completed? Yes   Wrap up additional comments Patient had telehealth appt. today, Encouraged by PCP to obtain pulse ox to monitor sats. Spouse states she will get today          Bonita Rangel RN

## 2021-12-02 NOTE — PROGRESS NOTES
Subjective   Perez Bolaños is a 71 y.o. male who is here for   Chief Complaint   Patient presents with   • Covid-19 Home Monitoring Video Visit   .     History of Present Illness   This is a Mychart Video medical encounter, occurring during the coronavirus COVID-19 pandemic of 2020.  Medical history from chart is reviewed. Audio visual encounter provided through a secure link via Zoom. Patient location is per their choice. Provider location is in my routine medical office in [facility]. Patient has given prior consent for this encounter, via check in process. Regarding EM coding: history will not be as robust and exam will be limited. Most EM coding decisions will be based on MDM and time.  Total face video time, 15 minutes . Total chart time pre and post chartin-20 minutes.    Video visit with Nikko today.  He and his wife and daughter all contracted COVID-19 last week.  None of them receive the vaccine.  Nikko is in the ER 2 days ago with hypoxia.  Tested positive.  CT scan of chest reveals bilateral COVID-19 pneumonia.  His sats are in the low 90s.  Not want to be admitted.  Was sent home.  The ER attending sent him for the COVID-19 antibody injections at [facility].  He received those yesterday without incident     Today still not feeling very well still with fevers dry cough body aches some shortness of breath.  We will ask a family member to go out and get a pulse oximeter from the pharmacy.  If he drops below 90 he is to go back to the ER.          The following portions of the patient's history were reviewed and updated as appropriate: allergies, current medications, past family history, past medical history, past social history, past surgical history and problem list.    Review of Systems    Objective   There were no vitals filed for this visit.   Physical Exam  Constitutional:       Appearance: He is ill-appearing.   Pulmonary:      Effort: No respiratory distress.    Neurological:      Mental Status: He is alert.         Assessment/Plan   Diagnoses and all orders for this visit:    1. COVID-19 virus infection (Primary)      There are no Patient Instructions on file for this visit.    Medications Discontinued During This Encounter   Medication Reason   • diphenhydrAMINE (BENADRYL) capsule 50 mg    • diphenhydrAMINE (BENADRYL) injection 50 mg    • EPINEPHrine (ADRENALIN) injection 0.3 mg         No follow-ups on file.    Dr. Nav Carmona  [place], [place]

## 2021-12-03 ENCOUNTER — READMISSION MANAGEMENT (OUTPATIENT)
Dept: CALL CENTER | Facility: HOSPITAL | Age: 71
End: 2021-12-03

## 2021-12-03 NOTE — OUTREACH NOTE
COVID-19 Week 1 Survey      Responses   [facility] patient discharged from? [place]   Does the patient have one of the following disease processes/diagnoses(primary or secondary)? COVID-19   COVID-19 underlying condition? None   Call Number Call 2   Week 1 Call successful? Yes   Call start time 1519   Call end time 1528   Is patient permission given to speak with other caregiver? Yes   Person spoke with today (if not patient) and relationship Wanda and patient.   Meds reviewed with patient/caregiver? Yes   Is the patient having any side effects they believe may be caused by any medication additions or changes? No   Does the patient have all medications ordered at discharge? Yes   Is the patient taking all medications as directed (includes completed medication regime)? Yes   Medication comments Finished steroids today.   Comments regarding appointments Next virtual PCP appt 12/06/21.   Does the patient have a primary care provider?  Yes   Does the patient have an appointment with their PCP or specialist within 7 days of discharge? Yes   Has the patient kept scheduled appointments due by today? Yes   Has home health visited the patient within 72 hours of discharge? N/A   Psychosocial issues? No   Did the patient receive a copy of their discharge instructions? Yes   Did the patient receive a copy of COVID-19 specific instructions? Yes   Nursing interventions Reviewed instructions with patient   What is the patient's perception of their health status since discharge? Improving   Does the patient have any of the following symptoms? Cough,  Shortness of breath   Nursing Interventions Nurse provided patient education   Pulse Ox monitoring Intermittent   Pulse Ox device source Patient   O2 Sat comments 90-92% on RA.   O2 Sat: education provided Sat levels,  Monitoring frequency,  When to seek care   O2 Sat education comments Advised to return to ER if O2 sats remain below 90% per AVS.   Is the patient/caregiver able  to teach back the hierarchy of who to call/visit for symptoms/problems? PCP, Specialist, Home health nurse, Urgent Care, ED, 911 Yes   COVID-19 call completed? Yes   Wrap up additional comments States is slightly better today. States SOA has improved, coughing less. STates has completed quarantine, doing deep breathing exercises. Encouraged to replace toothbrush/paste/razor. Denies any needs today.          Tigist Dickinson RN

## 2021-12-04 ENCOUNTER — READMISSION MANAGEMENT (OUTPATIENT)
Dept: CALL CENTER | Facility: HOSPITAL | Age: 71
End: 2021-12-04

## 2021-12-04 ENCOUNTER — HOSPITAL ENCOUNTER (INPATIENT)
Facility: HOSPITAL | Age: 71
LOS: 7 days | Discharge: HOME | End: 2021-12-11
Attending: EMERGENCY MEDICINE | Admitting: INTERNAL MEDICINE

## 2021-12-04 ENCOUNTER — APPOINTMENT (OUTPATIENT)
Dept: CT IMAGING | Facility: HOSPITAL | Age: 71
End: 2021-12-04

## 2021-12-04 DIAGNOSIS — R74.01 TRANSAMINITIS: ICD-10-CM

## 2021-12-04 DIAGNOSIS — R53.1 WEAKNESS: ICD-10-CM

## 2021-12-04 DIAGNOSIS — U07.1 PNEUMONIA DUE TO COVID-19 VIRUS: Primary | ICD-10-CM

## 2021-12-04 DIAGNOSIS — J12.82 PNEUMONIA DUE TO COVID-19 VIRUS: Primary | ICD-10-CM

## 2021-12-04 DIAGNOSIS — R09.02 HYPOXIA: ICD-10-CM

## 2021-12-04 LAB
ALBUMIN SERPL-MCNC: 3.3 G/DL (ref 3.5–5.2)
ALBUMIN/GLOB SERPL: 1 G/DL
ALP SERPL-CCNC: 88 U/L (ref 39–117)
ALT SERPL W P-5'-P-CCNC: 627 U/L (ref 1–41)
ANION GAP SERPL CALCULATED.3IONS-SCNC: 12.4 MMOL/L (ref 5–15)
AST SERPL-CCNC: 220 U/L (ref 1–40)
BASOPHILS # BLD AUTO: 0.01 10*3/MM3 (ref 0–0.2)
BASOPHILS NFR BLD AUTO: 0.1 % (ref 0–1.5)
BILIRUB SERPL-MCNC: 0.6 MG/DL (ref 0–1.2)
BUN SERPL-MCNC: 18 MG/DL (ref 8–23)
BUN/CREAT SERPL: 15.7 (ref 7–25)
CALCIUM SPEC-SCNC: 8.8 MG/DL (ref 8.6–10.5)
CHLORIDE SERPL-SCNC: 99 MMOL/L (ref 98–107)
CO2 SERPL-SCNC: 23.6 MMOL/L (ref 22–29)
CREAT SERPL-MCNC: 1.15 MG/DL (ref 0.76–1.27)
D DIMER PPP FEU-MCNC: 4.13 MCGFEU/ML (ref 0–0.46)
DEPRECATED RDW RBC AUTO: 51.8 FL (ref 37–54)
EOSINOPHIL # BLD AUTO: 0.01 10*3/MM3 (ref 0–0.4)
EOSINOPHIL NFR BLD AUTO: 0.1 % (ref 0.3–6.2)
ERYTHROCYTE [DISTWIDTH] IN BLOOD BY AUTOMATED COUNT: 16.4 % (ref 12.3–15.4)
FERRITIN SERPL-MCNC: 748 NG/ML (ref 30–400)
GFR SERPL CREATININE-BSD FRML MDRD: 62 ML/MIN/1.73
GLOBULIN UR ELPH-MCNC: 3.2 GM/DL
GLUCOSE SERPL-MCNC: 119 MG/DL (ref 65–99)
HCT VFR BLD AUTO: 40.5 % (ref 37.5–51)
HGB BLD-MCNC: 13.4 G/DL (ref 13–17.7)
IMM GRANULOCYTES # BLD AUTO: 0.07 10*3/MM3 (ref 0–0.05)
IMM GRANULOCYTES NFR BLD AUTO: 0.7 % (ref 0–0.5)
LYMPHOCYTES # BLD AUTO: 0.76 10*3/MM3 (ref 0.7–3.1)
LYMPHOCYTES NFR BLD AUTO: 7.9 % (ref 19.6–45.3)
MCH RBC QN AUTO: 28.5 PG (ref 26.6–33)
MCHC RBC AUTO-ENTMCNC: 33.1 G/DL (ref 31.5–35.7)
MCV RBC AUTO: 86.2 FL (ref 79–97)
MONOCYTES # BLD AUTO: 0.31 10*3/MM3 (ref 0.1–0.9)
MONOCYTES NFR BLD AUTO: 3.2 % (ref 5–12)
NEUTROPHILS NFR BLD AUTO: 8.42 10*3/MM3 (ref 1.7–7)
NEUTROPHILS NFR BLD AUTO: 88 % (ref 42.7–76)
NRBC BLD AUTO-RTO: 0 /100 WBC (ref 0–0.2)
NT-PROBNP SERPL-MCNC: 521.2 PG/ML (ref 0–900)
PLATELET # BLD AUTO: 263 10*3/MM3 (ref 140–450)
PMV BLD AUTO: 9.5 FL (ref 6–12)
POTASSIUM SERPL-SCNC: 4.3 MMOL/L (ref 3.5–5.2)
PROT SERPL-MCNC: 6.5 G/DL (ref 6–8.5)
RBC # BLD AUTO: 4.7 10*6/MM3 (ref 4.14–5.8)
SODIUM SERPL-SCNC: 135 MMOL/L (ref 136–145)
TROPONIN T SERPL-MCNC: <0.01 NG/ML (ref 0–0.03)
WBC NRBC COR # BLD: 9.58 10*3/MM3 (ref 3.4–10.8)

## 2021-12-04 PROCEDURE — 85025 COMPLETE CBC W/AUTO DIFF WBC: CPT | Performed by: EMERGENCY MEDICINE

## 2021-12-04 PROCEDURE — 80053 COMPREHEN METABOLIC PANEL: CPT | Performed by: EMERGENCY MEDICINE

## 2021-12-04 PROCEDURE — 0 IOPAMIDOL PER 1 ML: Performed by: EMERGENCY MEDICINE

## 2021-12-04 PROCEDURE — 25010000002 DEXAMETHASONE PER 1 MG: Performed by: EMERGENCY MEDICINE

## 2021-12-04 PROCEDURE — 82728 ASSAY OF FERRITIN: CPT | Performed by: EMERGENCY MEDICINE

## 2021-12-04 PROCEDURE — 99285 EMERGENCY DEPT VISIT HI MDM: CPT | Performed by: EMERGENCY MEDICINE

## 2021-12-04 PROCEDURE — 84145 PROCALCITONIN (PCT): CPT | Performed by: INTERNAL MEDICINE

## 2021-12-04 PROCEDURE — 71275 CT ANGIOGRAPHY CHEST: CPT

## 2021-12-04 PROCEDURE — 84484 ASSAY OF TROPONIN QUANT: CPT | Performed by: EMERGENCY MEDICINE

## 2021-12-04 PROCEDURE — 99283 EMERGENCY DEPT VISIT LOW MDM: CPT

## 2021-12-04 PROCEDURE — 83880 ASSAY OF NATRIURETIC PEPTIDE: CPT | Performed by: EMERGENCY MEDICINE

## 2021-12-04 PROCEDURE — 93005 ELECTROCARDIOGRAM TRACING: CPT | Performed by: EMERGENCY MEDICINE

## 2021-12-04 PROCEDURE — 93010 ELECTROCARDIOGRAM REPORT: CPT | Performed by: INTERNAL MEDICINE

## 2021-12-04 PROCEDURE — 99284 EMERGENCY DEPT VISIT MOD MDM: CPT

## 2021-12-04 PROCEDURE — 85379 FIBRIN DEGRADATION QUANT: CPT | Performed by: EMERGENCY MEDICINE

## 2021-12-04 PROCEDURE — 94761 N-INVAS EAR/PLS OXIMETRY MLT: CPT

## 2021-12-04 PROCEDURE — 3E0333Z INTRODUCTION OF ANTI-INFLAMMATORY INTO PERIPHERAL VEIN, PERCUTANEOUS APPROACH: ICD-10-PCS | Performed by: HOSPITALIST

## 2021-12-04 RX ORDER — SODIUM CHLORIDE 0.9 % (FLUSH) 0.9 %
10 SYRINGE (ML) INJECTION AS NEEDED
Status: DISCONTINUED | OUTPATIENT
Start: 2021-12-04 | End: 2021-12-11 | Stop reason: HOSPADM

## 2021-12-04 RX ORDER — DEXAMETHASONE SODIUM PHOSPHATE 10 MG/ML
6 INJECTION INTRAMUSCULAR; INTRAVENOUS ONCE
Status: COMPLETED | OUTPATIENT
Start: 2021-12-04 | End: 2021-12-04

## 2021-12-04 RX ORDER — SODIUM CHLORIDE 9 MG/ML
125 INJECTION, SOLUTION INTRAVENOUS CONTINUOUS
Status: DISCONTINUED | OUTPATIENT
Start: 2021-12-04 | End: 2021-12-06

## 2021-12-04 RX ADMIN — DEXAMETHASONE SODIUM PHOSPHATE 6 MG: 10 INJECTION INTRAMUSCULAR; INTRAVENOUS at 21:07

## 2021-12-04 RX ADMIN — IOPAMIDOL 100 ML: 755 INJECTION, SOLUTION INTRAVENOUS at 21:58

## 2021-12-04 RX ADMIN — SODIUM CHLORIDE 125 ML/HR: 9 INJECTION, SOLUTION INTRAVENOUS at 23:41

## 2021-12-04 NOTE — OUTREACH NOTE
COVID-19 Week 1 Survey      Responses   [facility] patient discharged from? [place]   Does the patient have one of the following disease processes/diagnoses(primary or secondary)? COVID-19   COVID-19 underlying condition? None   Call Number Call 3   Week 1 Call successful? No   Discharge diagnosis PN r/t Covid 19          Alexus Rapp RN

## 2021-12-05 ENCOUNTER — READMISSION MANAGEMENT (OUTPATIENT)
Dept: CALL CENTER | Facility: HOSPITAL | Age: 71
End: 2021-12-05

## 2021-12-05 LAB
ALBUMIN SERPL-MCNC: 2.9 G/DL (ref 3.5–5.2)
ALBUMIN/GLOB SERPL: 0.9 G/DL
ALP SERPL-CCNC: 77 U/L (ref 39–117)
ALT SERPL W P-5'-P-CCNC: 489 U/L (ref 1–41)
ANION GAP SERPL CALCULATED.3IONS-SCNC: 12.5 MMOL/L (ref 5–15)
AST SERPL-CCNC: 129 U/L (ref 1–40)
BACTERIA SPEC AEROBE CULT: NORMAL
BACTERIA SPEC AEROBE CULT: NORMAL
BASOPHILS # BLD AUTO: 0 10*3/MM3 (ref 0–0.2)
BASOPHILS NFR BLD AUTO: 0 % (ref 0–1.5)
BILIRUB SERPL-MCNC: 0.5 MG/DL (ref 0–1.2)
BUN SERPL-MCNC: 16 MG/DL (ref 8–23)
BUN/CREAT SERPL: 16.5 (ref 7–25)
CALCIUM SPEC-SCNC: 8.7 MG/DL (ref 8.6–10.5)
CHLORIDE SERPL-SCNC: 103 MMOL/L (ref 98–107)
CO2 SERPL-SCNC: 20.5 MMOL/L (ref 22–29)
CREAT SERPL-MCNC: 0.97 MG/DL (ref 0.76–1.27)
DEPRECATED RDW RBC AUTO: 52.5 FL (ref 37–54)
EOSINOPHIL # BLD AUTO: 0 10*3/MM3 (ref 0–0.4)
EOSINOPHIL NFR BLD AUTO: 0 % (ref 0.3–6.2)
ERYTHROCYTE [DISTWIDTH] IN BLOOD BY AUTOMATED COUNT: 16.5 % (ref 12.3–15.4)
GFR SERPL CREATININE-BSD FRML MDRD: 76 ML/MIN/1.73
GLOBULIN UR ELPH-MCNC: 3.2 GM/DL
GLUCOSE SERPL-MCNC: 143 MG/DL (ref 65–99)
HAV IGM SERPL QL IA: NORMAL
HBA1C MFR BLD: 6.6 % (ref 4.8–5.6)
HBV CORE IGM SERPL QL IA: NORMAL
HBV SURFACE AG SERPL QL IA: NORMAL
HCT VFR BLD AUTO: 38.1 % (ref 37.5–51)
HCV AB SER DONR QL: NORMAL
HGB BLD-MCNC: 12.4 G/DL (ref 13–17.7)
IMM GRANULOCYTES # BLD AUTO: 0.04 10*3/MM3 (ref 0–0.05)
IMM GRANULOCYTES NFR BLD AUTO: 0.5 % (ref 0–0.5)
LYMPHOCYTES # BLD AUTO: 0.44 10*3/MM3 (ref 0.7–3.1)
LYMPHOCYTES NFR BLD AUTO: 5.2 % (ref 19.6–45.3)
MCH RBC QN AUTO: 28.2 PG (ref 26.6–33)
MCHC RBC AUTO-ENTMCNC: 32.5 G/DL (ref 31.5–35.7)
MCV RBC AUTO: 86.6 FL (ref 79–97)
MONOCYTES # BLD AUTO: 0.29 10*3/MM3 (ref 0.1–0.9)
MONOCYTES NFR BLD AUTO: 3.4 % (ref 5–12)
NEUTROPHILS NFR BLD AUTO: 7.73 10*3/MM3 (ref 1.7–7)
NEUTROPHILS NFR BLD AUTO: 90.9 % (ref 42.7–76)
NRBC BLD AUTO-RTO: 0 /100 WBC (ref 0–0.2)
PLATELET # BLD AUTO: 268 10*3/MM3 (ref 140–450)
PMV BLD AUTO: 9.9 FL (ref 6–12)
POTASSIUM SERPL-SCNC: 4.6 MMOL/L (ref 3.5–5.2)
PROCALCITONIN SERPL-MCNC: 0.14 NG/ML (ref 0–0.25)
PROCALCITONIN SERPL-MCNC: 0.15 NG/ML (ref 0–0.25)
PROT SERPL-MCNC: 6.1 G/DL (ref 6–8.5)
RBC # BLD AUTO: 4.4 10*6/MM3 (ref 4.14–5.8)
SODIUM SERPL-SCNC: 136 MMOL/L (ref 136–145)
WBC NRBC COR # BLD: 8.5 10*3/MM3 (ref 3.4–10.8)

## 2021-12-05 PROCEDURE — 94761 N-INVAS EAR/PLS OXIMETRY MLT: CPT

## 2021-12-05 PROCEDURE — 25010000002 ENOXAPARIN PER 10 MG

## 2021-12-05 PROCEDURE — 80074 ACUTE HEPATITIS PANEL: CPT | Performed by: INTERNAL MEDICINE

## 2021-12-05 PROCEDURE — 94799 UNLISTED PULMONARY SVC/PX: CPT

## 2021-12-05 PROCEDURE — 85025 COMPLETE CBC W/AUTO DIFF WBC: CPT | Performed by: INTERNAL MEDICINE

## 2021-12-05 PROCEDURE — 99223 1ST HOSP IP/OBS HIGH 75: CPT | Performed by: INTERNAL MEDICINE

## 2021-12-05 PROCEDURE — 83036 HEMOGLOBIN GLYCOSYLATED A1C: CPT | Performed by: INTERNAL MEDICINE

## 2021-12-05 PROCEDURE — 80053 COMPREHEN METABOLIC PANEL: CPT | Performed by: INTERNAL MEDICINE

## 2021-12-05 PROCEDURE — 84145 PROCALCITONIN (PCT): CPT | Performed by: INTERNAL MEDICINE

## 2021-12-05 PROCEDURE — 63710000001 DEXAMETHASONE PER 0.25 MG: Performed by: INTERNAL MEDICINE

## 2021-12-05 RX ORDER — NITROGLYCERIN 0.4 MG/1
0.4 TABLET SUBLINGUAL
Status: DISCONTINUED | OUTPATIENT
Start: 2021-12-05 | End: 2021-12-10

## 2021-12-05 RX ORDER — SODIUM CHLORIDE 0.9 % (FLUSH) 0.9 %
10 SYRINGE (ML) INJECTION EVERY 12 HOURS SCHEDULED
Status: DISCONTINUED | OUTPATIENT
Start: 2021-12-05 | End: 2021-12-11 | Stop reason: HOSPADM

## 2021-12-05 RX ORDER — CHLORPHENIRAMINE MALEATE 4 MG/1
4 TABLET ORAL EVERY 6 HOURS PRN
Status: DISCONTINUED | OUTPATIENT
Start: 2021-12-05 | End: 2021-12-11 | Stop reason: HOSPADM

## 2021-12-05 RX ORDER — DEXAMETHASONE 4 MG/1
6 TABLET ORAL
Status: DISCONTINUED | OUTPATIENT
Start: 2021-12-05 | End: 2021-12-05

## 2021-12-05 RX ORDER — ONDANSETRON 4 MG/1
4 TABLET, FILM COATED ORAL EVERY 6 HOURS PRN
Status: DISCONTINUED | OUTPATIENT
Start: 2021-12-05 | End: 2021-12-11 | Stop reason: HOSPADM

## 2021-12-05 RX ORDER — PANTOPRAZOLE SODIUM 40 MG/1
40 TABLET, DELAYED RELEASE ORAL DAILY
Status: DISCONTINUED | OUTPATIENT
Start: 2021-12-05 | End: 2021-12-11 | Stop reason: HOSPADM

## 2021-12-05 RX ORDER — CHOLECALCIFEROL (VITAMIN D3) 125 MCG
5 CAPSULE ORAL NIGHTLY PRN
Status: DISCONTINUED | OUTPATIENT
Start: 2021-12-05 | End: 2021-12-11 | Stop reason: HOSPADM

## 2021-12-05 RX ORDER — MULTIPLE VITAMINS W/ MINERALS TAB 9MG-400MCG
1 TAB ORAL DAILY
Status: DISCONTINUED | OUTPATIENT
Start: 2021-12-05 | End: 2021-12-11 | Stop reason: HOSPADM

## 2021-12-05 RX ORDER — ONDANSETRON 2 MG/ML
4 INJECTION INTRAMUSCULAR; INTRAVENOUS EVERY 6 HOURS PRN
Status: DISCONTINUED | OUTPATIENT
Start: 2021-12-05 | End: 2021-12-11 | Stop reason: HOSPADM

## 2021-12-05 RX ORDER — FERROUS GLUCONATE 324(37.5)
324 TABLET ORAL
Status: DISCONTINUED | OUTPATIENT
Start: 2021-12-05 | End: 2021-12-11 | Stop reason: HOSPADM

## 2021-12-05 RX ORDER — L.ACID,PARA/B.BIFIDUM/S.THERM 8B CELL
1 CAPSULE ORAL DAILY
Status: DISCONTINUED | OUTPATIENT
Start: 2021-12-05 | End: 2021-12-11 | Stop reason: HOSPADM

## 2021-12-05 RX ORDER — SODIUM CHLORIDE 9 MG/ML
40 INJECTION, SOLUTION INTRAVENOUS AS NEEDED
Status: DISCONTINUED | OUTPATIENT
Start: 2021-12-05 | End: 2021-12-11 | Stop reason: HOSPADM

## 2021-12-05 RX ORDER — DEXAMETHASONE 4 MG/1
6 TABLET ORAL EVERY 12 HOURS SCHEDULED
Status: DISCONTINUED | OUTPATIENT
Start: 2021-12-06 | End: 2021-12-06

## 2021-12-05 RX ORDER — PRAVASTATIN SODIUM 20 MG
20 TABLET ORAL NIGHTLY
Status: DISCONTINUED | OUTPATIENT
Start: 2021-12-05 | End: 2021-12-10

## 2021-12-05 RX ORDER — SODIUM CHLORIDE 0.9 % (FLUSH) 0.9 %
10 SYRINGE (ML) INJECTION AS NEEDED
Status: DISCONTINUED | OUTPATIENT
Start: 2021-12-05 | End: 2021-12-11 | Stop reason: HOSPADM

## 2021-12-05 RX ADMIN — Medication 400 MG: at 09:08

## 2021-12-05 RX ADMIN — Medication 324 MG: at 09:08

## 2021-12-05 RX ADMIN — SODIUM CHLORIDE 125 ML/HR: 9 INJECTION, SOLUTION INTRAVENOUS at 16:30

## 2021-12-05 RX ADMIN — SODIUM CHLORIDE, PRESERVATIVE FREE 10 ML: 5 INJECTION INTRAVENOUS at 20:48

## 2021-12-05 RX ADMIN — ENOXAPARIN SODIUM 40 MG: 40 INJECTION SUBCUTANEOUS at 00:18

## 2021-12-05 RX ADMIN — SODIUM CHLORIDE 125 ML/HR: 9 INJECTION, SOLUTION INTRAVENOUS at 07:40

## 2021-12-05 RX ADMIN — MULTIPLE VITAMINS W/ MINERALS TAB 1 TABLET: TAB at 09:08

## 2021-12-05 RX ADMIN — SODIUM CHLORIDE, PRESERVATIVE FREE 10 ML: 5 INJECTION INTRAVENOUS at 00:12

## 2021-12-05 RX ADMIN — Medication 1 CAPSULE: at 09:08

## 2021-12-05 RX ADMIN — DEXAMETHASONE 6 MG: 4 TABLET ORAL at 09:09

## 2021-12-05 RX ADMIN — PRAVASTATIN SODIUM 20 MG: 20 TABLET ORAL at 20:48

## 2021-12-05 RX ADMIN — SODIUM CHLORIDE, PRESERVATIVE FREE 10 ML: 5 INJECTION INTRAVENOUS at 09:08

## 2021-12-05 RX ADMIN — PANTOPRAZOLE SODIUM 40 MG: 40 TABLET, DELAYED RELEASE ORAL at 09:08

## 2021-12-05 NOTE — PROGRESS NOTES
Chart reviewed.   patient is on nasal cannula 3 L per nurse has been stable.    - will continue current care, per note they declined remdesivir and after discussing with nurse apparently the patient had been having symptoms for about 2 weeks which would likely place him out of the window for administration anyway.

## 2021-12-05 NOTE — OUTREACH NOTE
COVID-19 Week 1 Survey      Responses   [facility] patient discharged from? [place]   Does the patient have one of the following disease processes/diagnoses(primary or secondary)? COVID-19   COVID-19 underlying condition? None   Week 1 Call successful? No   Revoke Readmitted   Discharge diagnosis PN r/t Covid 19          Zully Lomax RN

## 2021-12-05 NOTE — ED NOTES
Radha called regarding bed.  Divina states that they will call [facility] for update     Tran Reddy RN  12/04/21 6335

## 2021-12-05 NOTE — PLAN OF CARE
Goal Outcome Evaluation:  Plan of Care Reviewed With: patient           Outcome Summary: patient resting at this time, cont on oxygen at 3 litre sat 91%, cont on i/v fluid , vital signs stable, denies pain cont to monitor
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
## 2021-12-05 NOTE — PLAN OF CARE
Goal Outcome Evaluation:  Plan of Care Reviewed With: patient        Progress: improving  Outcome Summary: vss. pt resting in room. continues on 3L o2 per nc. iv fluids continue in place. lovenox in place. pt denies pain. no other complaints at this time.

## 2021-12-05 NOTE — CASE MANAGEMENT/SOCIAL WORK
Discharge Planning Assessment  [facility]     Patient Name: Perez Bolaños  MRN: [id number]  Today's Date: 12/5/2021    Admit Date: 12/4/2021     Discharge Needs Assessment     Row Name 12/05/21 1635       Living Environment    Lives With spouse    Current Living Arrangements home/apartment/condo    Primary Care Provided by self    Family Caregiver if Needed spouse       Resource/Environmental Concerns    Transportation Concerns car, none       Transition Planning    Patient/Family Anticipates Transition to home with family    Patient/Family Anticipated Services at Transition none    Transportation Anticipated car, drives self       Discharge Needs Assessment    Equipment Currently Used at Home none    Provided Post Acute Provider List? N/A    Provided Post Acute Provider Quality & Resource List? N/A               Discharge Plan     Row Name 12/05/21 1635       Plan    Plan plan home w wife    Patient/Family in Agreement with Plan yes    Plan Comments Wearing appropriate PPE, spoke with patient at bedside. Face sheet verified. IMM explained, understanding verbalized and copy declined. Patient lives in a home with his wife and adult daughter and is independent of ADLs including driving. He denies use of DME/HH/Rehab He has a living will, encouraged to bring a copy to be scanned to medical record. He uses [facility] [place] and mailorder pharmacy. He denies issues obtaining medications. CM # placed on white board, will continue to follow for dc needs.              Continued Care and Services - Admitted Since 12/4/2021    Coordination has not been started for this encounter.          Demographic Summary     Row Name 12/05/21 1634       General Information    Admission Type inpatient    Required Notices Provided Important Message from Medicare    Referral Source admission list    Reason for Consult discharge planning    Preferred Language English     Used During This Interaction no       Contact  Information    Permission Granted to Share Info With                Functional Status    No documentation.                Psychosocial    No documentation.                Abuse/Neglect    No documentation.                Legal    No documentation.                Substance Abuse    No documentation.                Patient Forms    No documentation.                   Noah Banegas RN
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
## 2021-12-06 ENCOUNTER — TELEPHONE (OUTPATIENT)
Dept: FAMILY MEDICINE CLINIC | Facility: CLINIC | Age: 71
End: 2021-12-06

## 2021-12-06 LAB
CRP SERPL-MCNC: 5.04 MG/DL (ref 0–0.5)
D DIMER PPP FEU-MCNC: >20 MCGFEU/ML (ref 0–0.46)
FERRITIN SERPL-MCNC: 507 NG/ML (ref 30–400)
QT INTERVAL: 409 MS

## 2021-12-06 PROCEDURE — 99232 SBSQ HOSP IP/OBS MODERATE 35: CPT | Performed by: HOSPITALIST

## 2021-12-06 PROCEDURE — 85379 FIBRIN DEGRADATION QUANT: CPT | Performed by: HOSPITALIST

## 2021-12-06 PROCEDURE — 25010000002 ENOXAPARIN PER 10 MG: Performed by: INTERNAL MEDICINE

## 2021-12-06 PROCEDURE — 82728 ASSAY OF FERRITIN: CPT | Performed by: INTERNAL MEDICINE

## 2021-12-06 PROCEDURE — 63710000001 DEXAMETHASONE PER 0.25 MG: Performed by: INTERNAL MEDICINE

## 2021-12-06 PROCEDURE — 94761 N-INVAS EAR/PLS OXIMETRY MLT: CPT

## 2021-12-06 PROCEDURE — 86140 C-REACTIVE PROTEIN: CPT | Performed by: INTERNAL MEDICINE

## 2021-12-06 PROCEDURE — 25010000002 ENOXAPARIN PER 10 MG: Performed by: HOSPITALIST

## 2021-12-06 RX ORDER — DEXAMETHASONE 4 MG/1
2 TABLET ORAL ONCE
Status: COMPLETED | OUTPATIENT
Start: 2021-12-07 | End: 2021-12-07

## 2021-12-06 RX ORDER — DEXAMETHASONE 4 MG/1
8 TABLET ORAL EVERY 12 HOURS SCHEDULED
Status: DISCONTINUED | OUTPATIENT
Start: 2021-12-07 | End: 2021-12-11 | Stop reason: HOSPADM

## 2021-12-06 RX ORDER — DEXAMETHASONE 4 MG/1
TABLET ORAL
Status: COMPLETED
Start: 2021-12-06 | End: 2021-12-07

## 2021-12-06 RX ADMIN — ENOXAPARIN SODIUM 40 MG: 40 INJECTION SUBCUTANEOUS at 11:09

## 2021-12-06 RX ADMIN — Medication 1 CAPSULE: at 09:16

## 2021-12-06 RX ADMIN — SODIUM CHLORIDE, PRESERVATIVE FREE 10 ML: 5 INJECTION INTRAVENOUS at 09:17

## 2021-12-06 RX ADMIN — PRAVASTATIN SODIUM 20 MG: 20 TABLET ORAL at 22:14

## 2021-12-06 RX ADMIN — PANTOPRAZOLE SODIUM 40 MG: 40 TABLET, DELAYED RELEASE ORAL at 09:16

## 2021-12-06 RX ADMIN — SODIUM CHLORIDE, PRESERVATIVE FREE 10 ML: 5 INJECTION INTRAVENOUS at 22:16

## 2021-12-06 RX ADMIN — SODIUM CHLORIDE 125 ML/HR: 9 INJECTION, SOLUTION INTRAVENOUS at 01:17

## 2021-12-06 RX ADMIN — Medication 400 MG: at 09:16

## 2021-12-06 RX ADMIN — Medication 324 MG: at 09:16

## 2021-12-06 RX ADMIN — ENOXAPARIN SODIUM 40 MG: 40 INJECTION SUBCUTANEOUS at 22:14

## 2021-12-06 RX ADMIN — MULTIPLE VITAMINS W/ MINERALS TAB 1 TABLET: TAB at 09:16

## 2021-12-06 RX ADMIN — ASCORBIC ACID: 500 INJECTION INTRAVENOUS at 12:30

## 2021-12-06 RX ADMIN — DEXAMETHASONE 6 MG: 4 TABLET ORAL at 22:13

## 2021-12-06 RX ADMIN — DEXAMETHASONE 6 MG: 4 TABLET ORAL at 09:16

## 2021-12-06 NOTE — PROGRESS NOTES
"Hospitalist Team      Patient Care Team:  Nav Carmona MD as PCP - General (Family Medicine)  Stacia, Harvinder Awan MD as Consulting Physician (Gastroenterology)  Tyler Jean MD as Consulting Physician (Orthopedic Surgery)      Chief Complaint:  Follow-up Acute Hypoxic Respiratory Failure due to COVID-19 pneumonia    Subjective    Feels well, but does endorse dyspnea w/ movement.  He denies chest pain.  Minimal appetite, but does tolerate some PO.  He reports he is okay to go home on O2.    Objective    Vital Signs  Temp:  [97.3 °F (36.3 °C)-98.1 °F (36.7 °C)] 98 °F (36.7 °C)  Heart Rate:  [61-71] 68  Resp:  [16-20] 16  BP: (140-155)/(72-80) 144/74  Oxygen Therapy  SpO2: 92 %  Pulse Oximetry Type: Continuous  Device (Oxygen Therapy): high-flow nasal cannula, humidified  Flow (L/min): 7}    Flowsheet Rows      First Filed Value   Admission Height 175.3 cm (69\") Documented at 12/04/2021 2335   Admission Weight 78.5 kg (173 lb) Documented at 12/04/2021 2335          Physical Exam:    General: Appears stated age in no acute distress.  Lungs: Breath sounds are diminished throughout all fields.  No wheeze or rales appreciated.  No accesory use noted.  CV: Regular rate and rhythm.  No murmur appreciated.  Radial pulses are 2+ and symmetric.  Abdomen: Soft and non-tender w/ active bowel sounds.  MSK: No C/C/E.  No asymmetry.  Skin: No evidence of embolic phenomena.  Neuro: CN II-XII grossly intact.  Psych: Normal affect Ox3.    Results Review:     I reviewed the patient's new clinical results.    Lab Results (last 24 hours)     Procedure Component Value Units Date/Time    C-reactive Protein [[id number]]  (Abnormal) Collected: 12/06/21 0417    Specimen: Blood Updated: 12/06/21 1119     C-Reactive Protein 5.04 mg/dL     D-dimer, Quantitative [[id number]]  (Abnormal) Collected: 12/06/21 0417    Specimen: Blood Updated: 12/06/21 0929     D-Dimer, Quantitative >20.00 MCGFEU/mL     Narrative:      Can be " elevated in, but is not diagnostic for deep vein thrombosis (DVT) or pulmonary embolis (PE).  It is also elevated in other medical conditions.  Clinical correlation is required.  The negative cut-off value for the D-Dimer is 0.50 mcg FEU/mL for DVT and PE.      Ferritin [[id number]]  (Abnormal) Collected: 12/06/21 0417    Specimen: Blood Updated: 12/06/21 0500     Ferritin 507.00 ng/mL     Narrative:      Results may be falsely decreased if patient taking Biotin.            Imaging Results (Last 24 Hours)     ** No results found for the last 24 hours. **          Medication Review:   I have reviewed the patient's current medication list    Current Facility-Administered Medications:   •  ascorbic acid 1,000 mg, sodium chloride 0.9 % 50 mL, , Intravenous, Daily, Gt Rogel MD, Stopped at 12/06/21 1310  •  chlorpheniramine (CHLOR-TRIMETON) tablet 4 mg, 4 mg, Oral, Q6H PRN, Horacio Lee, DO  •  dexamethasone (DECADRON) tablet 6 mg, 6 mg, Oral, Q12H, Nav Olivas DO, 6 mg at 12/06/21 0916  •  enoxaparin (LOVENOX) syringe 40 mg, 40 mg, Subcutaneous, Q24H, Marcello Barrios DO, 40 mg at 12/06/21 1109  •  ferrous gluconate tablet 324 mg, 324 mg, Oral, Daily With Breakfast, Horacio Lee, , 324 mg at 12/06/21 0916  •  lactobacillus acidophilus (RISAQUAD) capsule 1 capsule, 1 capsule, Oral, Daily, Horacio Lee DO, 1 capsule at 12/06/21 0916  •  magnesium oxide (MAG-OX) tablet 400 mg, 400 mg, Oral, Daily, Horacio Lee DO, 400 mg at 12/06/21 0916  •  melatonin tablet 5 mg, 5 mg, Oral, Nightly PRN, Marcello Barrios DO  •  multivitamin with minerals 1 tablet, 1 tablet, Oral, Daily, Horacio Lee, DO, 1 tablet at 12/06/21 0916  •  nitroglycerin (NITROSTAT) SL tablet 0.4 mg, 0.4 mg, Sublingual, Q5 Min PRN, Barrios, Birrilla,   •  ondansetron (ZOFRAN) tablet 4 mg, 4 mg, Oral, Q6H PRN **OR** ondansetron (ZOFRAN) injection 4 mg, 4 mg, Intravenous, Q6H PRN, Marcello Barrios,   •  pantoprazole (PROTONIX) EC tablet  40 mg, 40 mg, Oral, Daily, Horacio Lee, , 40 mg at 12/06/21 0916  •  pravastatin (PRAVACHOL) tablet 20 mg, 20 mg, Oral, Nightly, Horacio Lee, DO, 20 mg at 12/05/21 2048  •  [COMPLETED] Insert peripheral IV, , , Once **AND** sodium chloride 0.9 % flush 10 mL, 10 mL, Intravenous, PRN, Barrios, Birrilla, DO  •  sodium chloride 0.9 % flush 10 mL, 10 mL, Intravenous, PRN, Barrios, Birrilla, DO  •  sodium chloride 0.9 % flush 10 mL, 10 mL, Intravenous, Q12H, Barrios, Birrilla, DO, 10 mL at 12/06/21 0917  •  sodium chloride 0.9 % infusion 40 mL, 40 mL, Intravenous, PRN, Barrios, Birrilla, DO      Assessment/Plan     1.  Acute Hypoxic Respiratory Failure due to COVID-19 Pneumonia: Seems not to grasp how sick he is as he couldn't quite comprehend why he can't go home on his current O2 delivery.  He is out of the window for Remdesivir, but this was declined by wife.  His wife also asked that he be started on Vitamin C which I've added (although not much data supporting this).  Inflammatory markers are down.  D-dimer is elevated so I've increased his freqeuncy or prophylaxis.  Will continue to monitor.  I did explain that he would likely go home w/ O2.  Just not at the dose he is currently on.      Plan for disposition: Predicated on hospital course.    Gt Rogel MD  12/06/21  15:03 EST

## 2021-12-06 NOTE — PROGRESS NOTES
Patient with acutely increasing oxygen requirement secondary to COVID-19 pneumonia.  Will increase Decadron dosing to twice daily.  Respiratory placing patient on high flow nasal cannula.  Continue to monitor.

## 2021-12-06 NOTE — PLAN OF CARE
Goal Outcome Evaluation:  Plan of Care Reviewed With: patient        Progress: improving  Outcome Summary: vss. pt resting in room. continues on 7L o2 hi flow. Saline locked. lovenox in place. pt denies pain. Pt up in chair this afternoon. Continues on tele. no other complaints at this time.

## 2021-12-06 NOTE — PLAN OF CARE
Goal Outcome Evaluation:  Plan of Care Reviewed With: patient        Progress: improving  Outcome Summary: pt is resting in bed at this time with no c/o pain or discomfort noted.  IV cont at 125ml/hour, pt has no c/o pain or discomfort noted. will cont to monitor

## 2021-12-06 NOTE — TELEPHONE ENCOUNTER
PATIENT'S WIFE CALLED TO CANCEL TODAY'S APPOINTMENT, DUE TO HIM BEING HOSPITALIZED WITH PNEUMONIA AND LOW OXYGEN LEVEL.    CALL BACK NUMBER [phone]    RAYSHAWN HERRERA

## 2021-12-07 ENCOUNTER — APPOINTMENT (OUTPATIENT)
Dept: ULTRASOUND IMAGING | Facility: HOSPITAL | Age: 71
End: 2021-12-07

## 2021-12-07 ENCOUNTER — APPOINTMENT (OUTPATIENT)
Dept: CT IMAGING | Facility: HOSPITAL | Age: 71
End: 2021-12-07

## 2021-12-07 LAB
CRP SERPL-MCNC: 7.36 MG/DL (ref 0–0.5)
FERRITIN SERPL-MCNC: 616 NG/ML (ref 30–400)
PROCALCITONIN SERPL-MCNC: 0.16 NG/ML (ref 0–0.25)

## 2021-12-07 PROCEDURE — 94761 N-INVAS EAR/PLS OXIMETRY MLT: CPT

## 2021-12-07 PROCEDURE — 84145 PROCALCITONIN (PCT): CPT | Performed by: INTERNAL MEDICINE

## 2021-12-07 PROCEDURE — 82728 ASSAY OF FERRITIN: CPT | Performed by: INTERNAL MEDICINE

## 2021-12-07 PROCEDURE — 0 IOPAMIDOL PER 1 ML: Performed by: INTERNAL MEDICINE

## 2021-12-07 PROCEDURE — 93970 EXTREMITY STUDY: CPT

## 2021-12-07 PROCEDURE — 86140 C-REACTIVE PROTEIN: CPT | Performed by: INTERNAL MEDICINE

## 2021-12-07 PROCEDURE — 63710000001 DEXAMETHASONE PER 0.25 MG

## 2021-12-07 PROCEDURE — 94799 UNLISTED PULMONARY SVC/PX: CPT

## 2021-12-07 PROCEDURE — 71275 CT ANGIOGRAPHY CHEST: CPT

## 2021-12-07 PROCEDURE — 63710000001 DEXAMETHASONE PER 0.25 MG: Performed by: INTERNAL MEDICINE

## 2021-12-07 PROCEDURE — 99232 SBSQ HOSP IP/OBS MODERATE 35: CPT | Performed by: HOSPITALIST

## 2021-12-07 PROCEDURE — 25010000002 ENOXAPARIN PER 10 MG: Performed by: HOSPITALIST

## 2021-12-07 RX ADMIN — Medication 1 CAPSULE: at 09:09

## 2021-12-07 RX ADMIN — IOPAMIDOL 100 ML: 755 INJECTION, SOLUTION INTRAVENOUS at 01:46

## 2021-12-07 RX ADMIN — MULTIPLE VITAMINS W/ MINERALS TAB 1 TABLET: TAB at 09:09

## 2021-12-07 RX ADMIN — DEXAMETHASONE 2 MG: 4 TABLET ORAL at 00:55

## 2021-12-07 RX ADMIN — ASCORBIC ACID: 500 INJECTION INTRAVENOUS at 09:10

## 2021-12-07 RX ADMIN — PANTOPRAZOLE SODIUM 40 MG: 40 TABLET, DELAYED RELEASE ORAL at 09:09

## 2021-12-07 RX ADMIN — Medication 400 MG: at 09:09

## 2021-12-07 RX ADMIN — DEXAMETHASONE 8 MG: 4 TABLET ORAL at 09:10

## 2021-12-07 RX ADMIN — SODIUM CHLORIDE, PRESERVATIVE FREE 10 ML: 5 INJECTION INTRAVENOUS at 21:25

## 2021-12-07 RX ADMIN — ENOXAPARIN SODIUM 80 MG: 80 INJECTION SUBCUTANEOUS at 22:57

## 2021-12-07 RX ADMIN — Medication 324 MG: at 09:09

## 2021-12-07 RX ADMIN — PRAVASTATIN SODIUM 20 MG: 20 TABLET ORAL at 21:23

## 2021-12-07 RX ADMIN — SODIUM CHLORIDE, PRESERVATIVE FREE 10 ML: 5 INJECTION INTRAVENOUS at 09:10

## 2021-12-07 RX ADMIN — ENOXAPARIN SODIUM 40 MG: 40 INJECTION SUBCUTANEOUS at 11:42

## 2021-12-07 RX ADMIN — DEXAMETHASONE 8 MG: 4 TABLET ORAL at 21:23

## 2021-12-07 NOTE — PROGRESS NOTES
"Hospitalist Team      Patient Care Team:  Nav Carmona MD as PCP - General (Family Medicine)  Stacia, Harvinder Awan MD as Consulting Physician (Gastroenterology)  Tyler Jean MD as Consulting Physician (Orthopedic Surgery)      Chief Complaint:  Follow-up Acute Hypoxic Respiratory Failure due to COVID Pneumonia    Subjective    Events noted overnight.  Mr. Bolaños reports that he feels pretty good today.  He still doesn't have much appetite, but he does tolerate what he takes in.  Not as dyspneic w/ ambulation to the bathroom.    Objective    Vital Signs  Temp:  [97.8 °F (36.6 °C)-99 °F (37.2 °C)] 98.1 °F (36.7 °C)  Heart Rate:  [70-82] 71  Resp:  [18-20] 20  BP: (140-159)/(73-81) 159/79  Oxygen Therapy  SpO2: 94 %  Pulse Oximetry Type: Continuous  Device (Oxygen Therapy): humidified, high-flow nasal cannula  Flow (L/min): 15}  Flowsheet Rows      First Filed Value   Admission Height 175.3 cm (69\") Documented at 12/04/2021 2335   Admission Weight 78.5 kg (173 lb) Documented at 12/04/2021 2335        Physical Exam:    General: No acute distress.  Lungs: Breath sounds are diminished throughout all fields.  Respirations are non-labored.  CV: Regular rate and rhythm.  No murmur appreciated.  Radial pulses are 2+ and symmetric.  Abdomen: Soft and non-tender w/ active bowel sounds.  MSK: No C/C/E.  Skin: No evidence of embolic phenomena.  Neuro: CN II-XII grossly intact.  Psych: Pleasant affect.  Ox3.    Results Review:     I reviewed the patient's new clinical results.    Lab Results (last 24 hours)     Procedure Component Value Units Date/Time    C-reactive Protein [[id number]]  (Abnormal) Collected: 12/07/21 0352    Specimen: Blood Updated: 12/07/21 1143     C-Reactive Protein 7.36 mg/dL     Ferritin [[id number]]  (Abnormal) Collected: 12/07/21 0352    Specimen: Blood Updated: 12/07/21 0501     Ferritin 616.00 ng/mL     Narrative:      Results may be falsely decreased if patient taking Biotin.      " Procalcitonin [[id number]]  (Normal) Collected: 12/07/21 0352    Specimen: Blood Updated: 12/07/21 0455     Procalcitonin 0.16 ng/mL     Narrative:      Results may be falsely decreased if patient taking Biotin.           Imaging Results (Last 24 Hours)     Procedure Component Value Units Date/Time    US Venous Doppler Lower Extremity Bilateral (duplex) [[id number]] Collected: 12/07/21 1146     Updated: 12/07/21 1152    Narrative:      VENOUS DOPPLER ULTRASOUND, BILATERAL LOWER EXTREMITIES, 12/07/2021     HISTORY:   71-year-old male hospital inpatient with Covid 19 pneumonia and elevated  d-dimer.     TECHNIQUE:   Venous Doppler ultrasound examination of both legs was performed using  grey-scale, spectral Doppler, and color flow Doppler ultrasound imaging.     FINDINGS:   Examination shows bilateral calf vein thrombus.  *  On the right, there is occlusive DVT within the mid and upper  posterior tibial vein and within the mid and upper soleal vein.  *  On the left, thrombus is present within the small saphenous vein.     There is no deep venous thrombosis or superficial venous thrombosis at,  or above the knee bilaterally. Greater saphenous veins are patent.       Impression:      1.  Right calf vein DVT and left calf SVT as noted above.  2.  No evidence of DVT or SVT above the calf level on the right or left.     This report was finalized on 12/7/2021 11:50 AM by Dr. Gary Pettit MD.       CT Angiogram Chest With & Without Contrast [[id number]] Collected: 12/07/21 0207     Updated: 12/07/21 0209    Narrative:      CT ANGIOGRAM CHEST W WO CONTRAST    INDICATION:   Hypoxemia, elevated d-dimer    TECHNIQUE:   CT angiogram of the chest with IV contrast. 3-D reconstructions were obtained and reviewed.   Radiation dose reduction techniques included automated exposure control or exposure modulation based on body size. Count of known CT and cardiac nuc med studies  performed in previous 12 months: 2.     COMPARISON:    12/4/2021    FINDINGS:   There are stable large round dense infiltrates throughout the lungs the aorta is normal in size. There is optimal opacification of the pulmonary arteries and there is no CT evidence of pulmonary embolus. There is no adenopathy. Upper abdominal images are  unremarkable. Bones are unremarkable.    At      Impression:      1. Negative for pulmonary embolus.    2. No change in bilateral groundglass infiltrates    Signer Name: Vance Leonard MD   Signed: 12/7/2021 2:07 AM   Workstation Name: [facility]    [facility] reviewed personally by physician.    Medication Review:   I have reviewed the patient's current medication list    Current Facility-Administered Medications:   •  ascorbic acid 1,000 mg, sodium chloride 0.9 % 50 mL, , Intravenous, Daily, Gt Rogel MD, Last Rate: 0 mL/hr at 12/06/21 1310, New Bag at 12/07/21 0910  •  chlorpheniramine (CHLOR-TRIMETON) tablet 4 mg, 4 mg, Oral, Q6H PRN, Horacio Lee DO  •  dexamethasone (DECADRON) tablet 8 mg, 8 mg, Oral, Q12H, Nav Olivas DO, 8 mg at 12/07/21 0910  •  enoxaparin (LOVENOX) syringe 40 mg, 40 mg, Subcutaneous, Q12H, Gt Rogel MD, 40 mg at 12/07/21 1142  •  ferrous gluconate tablet 324 mg, 324 mg, Oral, Daily With Breakfast, Horacio Lee DO, 324 mg at 12/07/21 0909  •  lactobacillus acidophilus (RISAQUAD) capsule 1 capsule, 1 capsule, Oral, Daily, Horacio Lee DO, 1 capsule at 12/07/21 0909  •  magnesium oxide (MAG-OX) tablet 400 mg, 400 mg, Oral, Daily, Horacio Lee DO, 400 mg at 12/07/21 0909  •  melatonin tablet 5 mg, 5 mg, Oral, Nightly PRN, Marcello Barrios DO  •  multivitamin with minerals 1 tablet, 1 tablet, Oral, Daily, Horacio Lee, , 1 tablet at 12/07/21 0909  •  nitroglycerin (NITROSTAT) SL tablet 0.4 mg, 0.4 mg, Sublingual, Q5 Min PRN, Marcello Barrios,   •  ondansetron (ZOFRAN) tablet 4 mg, 4 mg, Oral, Q6H PRN **OR** ondansetron (ZOFRAN) injection 4 mg, 4  "mg, Intravenous, Q6H PRN, Barrios, Birrilla, DO  •  pantoprazole (PROTONIX) EC tablet 40 mg, 40 mg, Oral, Daily, Horacio Lee, DO, 40 mg at 12/07/21 0909  •  pravastatin (PRAVACHOL) tablet 20 mg, 20 mg, Oral, Nightly, Horacio Lee, DO, 20 mg at 12/06/21 2214  •  [COMPLETED] Insert peripheral IV, , , Once **AND** sodium chloride 0.9 % flush 10 mL, 10 mL, Intravenous, PRN, Barrios, Birrilla, DO  •  sodium chloride 0.9 % flush 10 mL, 10 mL, Intravenous, PRN, Barrios, Birrilla, DO  •  sodium chloride 0.9 % flush 10 mL, 10 mL, Intravenous, Q12H, Barrios, Birrilla, DO, 10 mL at 12/07/21 0910  •  sodium chloride 0.9 % infusion 40 mL, 40 mL, Intravenous, PRN, Barrios, Birrilla, DO      Assessment/Plan     1.  Acute Hypoxic Respiratory Failure due to COVID-19 Pneumonia: It appears he may be heading into a second \"inflammatory surge\" as his markers are beginning to increase and his O2 requirements have dramatically increased.  I'm going to ask Pulmonary to see.  His wife declined therapy w/ Remdesivir so I do not think she will be receptive to Baricitinib.  Continue to monitor and trend markers.  Prognosis guarded at this point.    2.  Transaminitis: Check CMP in AM.    3.  Right DVT: Likely the source of the dimer bump.  Lovenox dose increased.    Plan for disposition: Predicated on hospital course.  Prognosis guarded.    Gt Rogel MD  12/07/21  18:42 EST  "

## 2021-12-07 NOTE — PROGRESS NOTES
Contacted by RT about patient's increasing O2 requirement from 7L to 13L. Reviewed current medications, and increased decadron form 6mg BID to 8mg BID.     I also see where his D Dimer was 4 on 12/4/21 and CTA chest was negative for PE. I am concerned now with it being >20 in the face of rapidly increasing O2 requirement. Therefore I think we need to repeat the CTA chest unfortunately. I do see where his Lovenox was already increased to BID, but for the sake of treating going forward it would make a difference to know if there is a current PE there.

## 2021-12-07 NOTE — PLAN OF CARE
Goal Outcome Evaluation:              Outcome Summary: Pt alert and oriented, no complaints of pain during this shift. Pt on 7L high flow at beginning of shift, oxygen increased to 13L high flow to maintain sats above 90 and then increased again to 15L. Pt denies feeling SOA but states he feels very weak. VSS at this time, will continue to monitor.

## 2021-12-07 NOTE — PROGRESS NOTES
Adult Nutrition  Assessment/PES    Patient Name:  Perez Bolaños  YOB: 1950  MRN: [id number]  Admit Date:  12/4/2021    Assessment Date:  12/7/2021    Comments:  Agree with Regular diet. 75% of most meals recorded, noted O2 increased.  Will cont to follow and monitor.      Reason for Assessment     Row Name 12/07/21 1410          Reason for Assessment    Reason For Assessment identified at risk by screening criteria  COVID PNA     Diagnosis infection/sepsis; pulmonary disease  COVID PNA                Nutrition/Diet History     Row Name 12/07/21 1412          Nutrition/Diet History    Typical Food/Fluid Intake No answer to call to room. Pt on 15 L per rounds.                Anthropometrics     Row Name 12/07/21 1413          Anthropometrics    Weight --  173#            Usual Body Weight (UBW)    Weight Loss Time Frame 11.6# down from 6/29/21 wt            Body Mass Index (BMI)    BMI Assessment BMI 25-29.9: overweight                Labs/Tests/Procedures/Meds     Row Name 12/07/21 1413          Labs/Procedures/Meds    Lab Results Reviewed reviewed     Lab Results Comments glu 143 , HgA1c 6.6 H, Alt 489 H            Diagnostic Tests/Procedures    Diagnostic Test/Procedure Reviewed reviewed            Medications    Pertinent Medications Reviewed reviewed     Pertinent Medications Comments iron, decadron, magox, risaquad, MVT                  Estimated/Assessed Needs     Row Name 12/07/21 1413          Estimated/Assessed Needs    Additional Documentation Calorie Requirements (Group); Fluid Requirements (Group); Protein Requirements (Group)            Calorie Requirements    Estimated Calorie Need Method Wise-St Jeor     Estimated Calorie Requirement Comment 1901 kcal ( mifflin 1.25)            Protein Requirements    Est Protein Requirement Amount (gms/kg) 1.0 gm protein  79 gm pro            Fluid Requirements    Estimated Fluid Requirement Method RDA Method  1901 ml                Nutrition  Prescription Ordered     Row Name 12/07/21 1419          Nutrition Prescription PO    Current PO Diet Regular                Evaluation of Received Nutrient/Fluid Intake     Row Name 12/07/21 1419          Fluid Intake Evaluation    Oral Fluid (mL) 800  42%            PO Evaluation    Number of Meals 7     % PO Intake 64                     Problem/Interventions:   Problem 1     Row Name 12/07/21 1420          Nutrition Diagnoses Problem 1    Problem 1 Predicted Suboptimal Intake     Etiology (related to) Medical Diagnosis     Signs/Symptoms (evidenced by) Report/Observation                      Intervention Goal     Row Name 12/07/21 1421          Intervention Goal    General Meet nutritional needs for age/condition     PO PO intake (%)     PO Intake % 75 %  or greater     Weight No significant weight loss                Nutrition Intervention     Row Name 12/07/21 1421          Nutrition Intervention    RD/Tech Action Follow Tx progress                  Education/Evaluation     Row Name 12/07/21 1421          Education    Education No discharge needs identified at this time            Monitor/Evaluation    Monitor Per protocol; I&O; PO intake; Pertinent labs; Weight; Symptoms                 Electronically signed by:  Jeanette Vasquez RD  12/07/21 14:21 EST

## 2021-12-07 NOTE — CASE MANAGEMENT/SOCIAL WORK
Continued Stay Note  [facility]     Patient Name: Perez Bolaños  MRN: [id number]  Today's Date: 12/7/2021    Admit Date: 12/4/2021     Discharge Plan     Row Name 12/07/21 1646       Plan    Plan plan home with wife    Plan Comments From doorway, patient is resting in bed, feeling some better. He remains on HF 02@ 15L. CM will continue to follow.               Discharge Codes    No documentation.                     Noah Banegas RN

## 2021-12-08 LAB
ALBUMIN SERPL-MCNC: 2.9 G/DL (ref 3.5–5.2)
ALBUMIN/GLOB SERPL: 0.8 G/DL
ALP SERPL-CCNC: 91 U/L (ref 39–117)
ALT SERPL W P-5'-P-CCNC: 497 U/L (ref 1–41)
ANION GAP SERPL CALCULATED.3IONS-SCNC: 11.5 MMOL/L (ref 5–15)
AST SERPL-CCNC: 141 U/L (ref 1–40)
BASOPHILS # BLD AUTO: 0.02 10*3/MM3 (ref 0–0.2)
BASOPHILS NFR BLD AUTO: 0.2 % (ref 0–1.5)
BILIRUB SERPL-MCNC: 0.5 MG/DL (ref 0–1.2)
BUN SERPL-MCNC: 23 MG/DL (ref 8–23)
BUN/CREAT SERPL: 23.5 (ref 7–25)
CALCIUM SPEC-SCNC: 8.6 MG/DL (ref 8.6–10.5)
CHLORIDE SERPL-SCNC: 101 MMOL/L (ref 98–107)
CO2 SERPL-SCNC: 21.5 MMOL/L (ref 22–29)
CREAT SERPL-MCNC: 0.98 MG/DL (ref 0.76–1.27)
CRP SERPL-MCNC: 4.6 MG/DL (ref 0–0.5)
DEPRECATED RDW RBC AUTO: 50 FL (ref 37–54)
EOSINOPHIL # BLD AUTO: 0.01 10*3/MM3 (ref 0–0.4)
EOSINOPHIL NFR BLD AUTO: 0.1 % (ref 0.3–6.2)
ERYTHROCYTE [DISTWIDTH] IN BLOOD BY AUTOMATED COUNT: 15.9 % (ref 12.3–15.4)
FERRITIN SERPL-MCNC: 567 NG/ML (ref 30–400)
GFR SERPL CREATININE-BSD FRML MDRD: 75 ML/MIN/1.73
GLOBULIN UR ELPH-MCNC: 3.5 GM/DL
GLUCOSE SERPL-MCNC: 189 MG/DL (ref 65–99)
HCT VFR BLD AUTO: 41.6 % (ref 37.5–51)
HGB BLD-MCNC: 13.5 G/DL (ref 13–17.7)
IMM GRANULOCYTES # BLD AUTO: 0.11 10*3/MM3 (ref 0–0.05)
IMM GRANULOCYTES NFR BLD AUTO: 0.9 % (ref 0–0.5)
LYMPHOCYTES # BLD AUTO: 0.44 10*3/MM3 (ref 0.7–3.1)
LYMPHOCYTES NFR BLD AUTO: 3.8 % (ref 19.6–45.3)
MCH RBC QN AUTO: 28 PG (ref 26.6–33)
MCHC RBC AUTO-ENTMCNC: 32.5 G/DL (ref 31.5–35.7)
MCV RBC AUTO: 86.1 FL (ref 79–97)
MONOCYTES # BLD AUTO: 0.34 10*3/MM3 (ref 0.1–0.9)
MONOCYTES NFR BLD AUTO: 2.9 % (ref 5–12)
NEUTROPHILS NFR BLD AUTO: 10.69 10*3/MM3 (ref 1.7–7)
NEUTROPHILS NFR BLD AUTO: 92.1 % (ref 42.7–76)
NRBC BLD AUTO-RTO: 0 /100 WBC (ref 0–0.2)
PLATELET # BLD AUTO: 275 10*3/MM3 (ref 140–450)
PMV BLD AUTO: 10 FL (ref 6–12)
POTASSIUM SERPL-SCNC: 4.9 MMOL/L (ref 3.5–5.2)
PROCALCITONIN SERPL-MCNC: 0.16 NG/ML (ref 0–0.25)
PROT SERPL-MCNC: 6.4 G/DL (ref 6–8.5)
RBC # BLD AUTO: 4.83 10*6/MM3 (ref 4.14–5.8)
SODIUM SERPL-SCNC: 134 MMOL/L (ref 136–145)
WBC NRBC COR # BLD: 11.61 10*3/MM3 (ref 3.4–10.8)

## 2021-12-08 PROCEDURE — 85025 COMPLETE CBC W/AUTO DIFF WBC: CPT | Performed by: HOSPITALIST

## 2021-12-08 PROCEDURE — 86140 C-REACTIVE PROTEIN: CPT | Performed by: INTERNAL MEDICINE

## 2021-12-08 PROCEDURE — 63710000001 DEXAMETHASONE PER 0.25 MG: Performed by: INTERNAL MEDICINE

## 2021-12-08 PROCEDURE — 82728 ASSAY OF FERRITIN: CPT | Performed by: INTERNAL MEDICINE

## 2021-12-08 PROCEDURE — 99232 SBSQ HOSP IP/OBS MODERATE 35: CPT | Performed by: HOSPITALIST

## 2021-12-08 PROCEDURE — 94799 UNLISTED PULMONARY SVC/PX: CPT

## 2021-12-08 PROCEDURE — 94761 N-INVAS EAR/PLS OXIMETRY MLT: CPT

## 2021-12-08 PROCEDURE — 25010000002 ENOXAPARIN PER 10 MG: Performed by: HOSPITALIST

## 2021-12-08 PROCEDURE — 80053 COMPREHEN METABOLIC PANEL: CPT | Performed by: HOSPITALIST

## 2021-12-08 PROCEDURE — 84145 PROCALCITONIN (PCT): CPT | Performed by: HOSPITALIST

## 2021-12-08 RX ORDER — ASCORBIC ACID 500 MG
1000 TABLET ORAL 2 TIMES DAILY
Status: DISCONTINUED | OUTPATIENT
Start: 2021-12-09 | End: 2021-12-11 | Stop reason: HOSPADM

## 2021-12-08 RX ORDER — ASCORBIC ACID 500 MG
500 TABLET ORAL 2 TIMES DAILY
Status: DISCONTINUED | OUTPATIENT
Start: 2021-12-09 | End: 2021-12-08

## 2021-12-08 RX ADMIN — SODIUM CHLORIDE, PRESERVATIVE FREE 10 ML: 5 INJECTION INTRAVENOUS at 21:46

## 2021-12-08 RX ADMIN — ASCORBIC ACID: 500 INJECTION INTRAVENOUS at 09:57

## 2021-12-08 RX ADMIN — PANTOPRAZOLE SODIUM 40 MG: 40 TABLET, DELAYED RELEASE ORAL at 08:48

## 2021-12-08 RX ADMIN — ENOXAPARIN SODIUM 80 MG: 80 INJECTION SUBCUTANEOUS at 21:44

## 2021-12-08 RX ADMIN — ENOXAPARIN SODIUM 80 MG: 80 INJECTION SUBCUTANEOUS at 10:01

## 2021-12-08 RX ADMIN — Medication 324 MG: at 08:48

## 2021-12-08 RX ADMIN — Medication 400 MG: at 08:48

## 2021-12-08 RX ADMIN — MULTIPLE VITAMINS W/ MINERALS TAB 1 TABLET: TAB at 08:48

## 2021-12-08 RX ADMIN — DEXAMETHASONE 8 MG: 4 TABLET ORAL at 21:44

## 2021-12-08 RX ADMIN — SODIUM CHLORIDE, PRESERVATIVE FREE 10 ML: 5 INJECTION INTRAVENOUS at 08:48

## 2021-12-08 RX ADMIN — DEXAMETHASONE 8 MG: 4 TABLET ORAL at 08:48

## 2021-12-08 RX ADMIN — PRAVASTATIN SODIUM 20 MG: 20 TABLET ORAL at 21:43

## 2021-12-08 RX ADMIN — Medication 1 CAPSULE: at 08:48
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
## 2021-12-08 NOTE — PLAN OF CARE
Goal Outcome Evaluation:  Plan of Care Reviewed With: patient        Progress: improving  Outcome Summary: Alert and oriented, VSS, o2 titrated down to 13l high flow, sitting up in chair most of shift tolerated well.

## 2021-12-08 NOTE — PROGRESS NOTES
I spoke at length with Mrs. Bolañso, updating her on his condition.  Reported to her about the DVT as well as possible need for Baricitinib therapy.  All questions answered to her satisfaction.  Will have Dr. Blas see to evaluate for suitability of Baricitinib.

## 2021-12-08 NOTE — PLAN OF CARE
"Goal Outcome Evaluation:              Outcome Summary: Pt alert and oriented, denies any pain or SOB at this time. Pt states he is \"starting to feel better\". Continues on 15L high flow O2 and tolerates well. VSS at this time, will continue to monitor.  "

## 2021-12-09 LAB
CRP SERPL-MCNC: 2.05 MG/DL (ref 0–0.5)
FERRITIN SERPL-MCNC: 493 NG/ML (ref 30–400)

## 2021-12-09 PROCEDURE — 94761 N-INVAS EAR/PLS OXIMETRY MLT: CPT

## 2021-12-09 PROCEDURE — 63710000001 DEXAMETHASONE PER 0.25 MG: Performed by: INTERNAL MEDICINE

## 2021-12-09 PROCEDURE — 82728 ASSAY OF FERRITIN: CPT | Performed by: INTERNAL MEDICINE

## 2021-12-09 PROCEDURE — 99232 SBSQ HOSP IP/OBS MODERATE 35: CPT | Performed by: HOSPITALIST

## 2021-12-09 PROCEDURE — 94799 UNLISTED PULMONARY SVC/PX: CPT

## 2021-12-09 PROCEDURE — 25010000002 ENOXAPARIN PER 10 MG: Performed by: HOSPITALIST

## 2021-12-09 PROCEDURE — 86140 C-REACTIVE PROTEIN: CPT | Performed by: INTERNAL MEDICINE

## 2021-12-09 RX ADMIN — Medication 1 CAPSULE: at 09:04

## 2021-12-09 RX ADMIN — DEXAMETHASONE 8 MG: 4 TABLET ORAL at 09:04

## 2021-12-09 RX ADMIN — Medication 400 MG: at 09:04

## 2021-12-09 RX ADMIN — ENOXAPARIN SODIUM 80 MG: 80 INJECTION SUBCUTANEOUS at 21:52

## 2021-12-09 RX ADMIN — OXYCODONE HYDROCHLORIDE AND ACETAMINOPHEN 1000 MG: 500 TABLET ORAL at 09:04

## 2021-12-09 RX ADMIN — PANTOPRAZOLE SODIUM 40 MG: 40 TABLET, DELAYED RELEASE ORAL at 09:04

## 2021-12-09 RX ADMIN — OXYCODONE HYDROCHLORIDE AND ACETAMINOPHEN 1000 MG: 500 TABLET ORAL at 21:49

## 2021-12-09 RX ADMIN — SODIUM CHLORIDE, PRESERVATIVE FREE 10 ML: 5 INJECTION INTRAVENOUS at 09:04

## 2021-12-09 RX ADMIN — ENOXAPARIN SODIUM 80 MG: 80 INJECTION SUBCUTANEOUS at 11:23

## 2021-12-09 RX ADMIN — MULTIPLE VITAMINS W/ MINERALS TAB 1 TABLET: TAB at 09:04

## 2021-12-09 RX ADMIN — SODIUM CHLORIDE, PRESERVATIVE FREE 10 ML: 5 INJECTION INTRAVENOUS at 21:51

## 2021-12-09 RX ADMIN — PRAVASTATIN SODIUM 20 MG: 20 TABLET ORAL at 21:49

## 2021-12-09 RX ADMIN — Medication 324 MG: at 09:04

## 2021-12-09 RX ADMIN — DEXAMETHASONE 8 MG: 4 TABLET ORAL at 21:49

## 2021-12-09 NOTE — PLAN OF CARE
Goal Outcome Evaluation:           Progress: improving  Outcome Summary: patient vss. up in chair this shift. lovenox for DVT. titrating O2 down this shift and tolerating well. sinus rhythm on tele

## 2021-12-09 NOTE — PROGRESS NOTES
"Hospitalist Team      Patient Care Team:  Nav Carmona MD as PCP - General (Family Medicine)  Stacia, Harvinder Awan MD as Consulting Physician (Gastroenterology)  Tyler Jean MD as Consulting Physician (Orthopedic Surgery)      Chief Complaint: Follow-up Acute Hypoxic Respiratory Failure due to COVID-PNA    Subjective    Doing quite well this evening!  He was up and shaved.  He does not feel a dyspneic.  He denies chest pain.  Good appetite and PO intake.    Objective    Vital Signs  Temp:  [97.5 °F (36.4 °C)-99.3 °F (37.4 °C)] 97.5 °F (36.4 °C)  Heart Rate:  [] 101  Resp:  [16-20] 16  BP: (125-154)/(73-83) 140/78  Oxygen Therapy  SpO2: 93 %  Pulse Oximetry Type: Continuous  Device (Oxygen Therapy): nasal cannula  Flow (L/min): 10  Probe Placed On (Pulse Ox): Right:, hand}    Flowsheet Rows      First Filed Value   Admission Height 175.3 cm (69\") Documented at 12/04/2021 2335   Admission Weight 78.5 kg (173 lb) Documented at 12/04/2021 2335          Physical Exam:    General: Appears in no acute distress.  Lungs: Breath sounds are diminished throughout all fields.  Respirations are non-labored.  CV: Regular rate and rhythm.  No murmur.  Radial and pedal pulses are 2+ and symmetric.  Abdomen: Soft and non-tender w/ active bowel sounds.  MSK: No C/C/E.  Skin: No evidence of embolic phenomena.  Neuro: CN II-XII grossly intact.  Psych: Pleasant affect.  Ox3.    Results Review:     I reviewed the patient's new clinical results.    Lab Results (last 24 hours)     Procedure Component Value Units Date/Time    C-reactive Protein [[id number]]  (Abnormal) Collected: 12/08/21 0410    Specimen: Blood Updated: 12/08/21 1257     C-Reactive Protein 4.60 mg/dL     Procalcitonin [[id number]]  (Normal) Collected: 12/08/21 0410    Specimen: Blood Updated: 12/08/21 0510     Procalcitonin 0.16 ng/mL     Narrative:      Results may be falsely decreased if patient taking Biotin.     Comprehensive Metabolic Panel " [[id number]]  (Abnormal) Collected: 12/08/21 0410    Specimen: Blood Updated: 12/08/21 0508     Glucose 189 mg/dL      BUN 23 mg/dL      Creatinine 0.98 mg/dL      Sodium 134 mmol/L      Potassium 4.9 mmol/L      Chloride 101 mmol/L      CO2 21.5 mmol/L      Calcium 8.6 mg/dL      Total Protein 6.4 g/dL      Albumin 2.90 g/dL      ALT (SGPT) 497 U/L      AST (SGOT) 141 U/L      Alkaline Phosphatase 91 U/L      Total Bilirubin 0.5 mg/dL      eGFR Non African Amer 75 mL/min/1.73      Globulin 3.5 gm/dL      A/G Ratio 0.8 g/dL      BUN/Creatinine Ratio 23.5     Anion Gap 11.5 mmol/L     Narrative:      GFR Normal >60  Chronic Kidney Disease <60  Kidney Failure <15      Ferritin [[id number]]  (Abnormal) Collected: 12/08/21 0410    Specimen: Blood Updated: 12/08/21 0508     Ferritin 567.00 ng/mL     Narrative:      Results may be falsely decreased if patient taking Biotin.      CBC & Differential [[id number]]  (Abnormal) Collected: 12/08/21 0410    Specimen: Blood Updated: 12/08/21 0437    Narrative:      The following orders were created for panel order CBC & Differential.  Procedure                               Abnormality         Status                     ---------                               -----------         ------                     CBC Auto Differential[[id number]]        Abnormal            Final result                 Please view results for these tests on the individual orders.    CBC Auto Differential [[id number]]  (Abnormal) Collected: 12/08/21 0410    Specimen: Blood Updated: 12/08/21 0437     WBC 11.61 10*3/mm3      RBC 4.83 10*6/mm3      Hemoglobin 13.5 g/dL      Hematocrit 41.6 %      MCV 86.1 fL      MCH 28.0 pg      MCHC 32.5 g/dL      RDW 15.9 %      RDW-SD 50.0 fl      MPV 10.0 fL      Platelets 275 10*3/mm3      Neutrophil % 92.1 %      Lymphocyte % 3.8 %      Monocyte % 2.9 %      Eosinophil % 0.1 %      Basophil % 0.2 %      Immature Grans % 0.9 %      Neutrophils, Absolute 10.69 10*3/mm3       Lymphocytes, Absolute 0.44 10*3/mm3      Monocytes, Absolute 0.34 10*3/mm3      Eosinophils, Absolute 0.01 10*3/mm3      Basophils, Absolute 0.02 10*3/mm3      Immature Grans, Absolute 0.11 10*3/mm3      nRBC 0.0 /100 WBC           Imaging Results (Last 24 Hours)     ** No results found for the last 24 hours. **          Medication Review:   I have reviewed the patient's current medication list    Current Facility-Administered Medications:   •  [START ON 12/9/2021] ascorbic acid (VITAMIN C) tablet 500 mg, 500 mg, Oral, BID, Gt Rogel MD  •  chlorpheniramine (CHLOR-TRIMETON) tablet 4 mg, 4 mg, Oral, Q6H PRN, Horacio Lee DO  •  dexamethasone (DECADRON) tablet 8 mg, 8 mg, Oral, Q12H, Nav Olivas DO, 8 mg at 12/08/21 0848  •  enoxaparin (LOVENOX) syringe 80 mg, 1 mg/kg, Subcutaneous, Q12H, Gt Rogel MD, 80 mg at 12/08/21 1001  •  ferrous gluconate tablet 324 mg, 324 mg, Oral, Daily With Breakfast, Horacio Lee DO, 324 mg at 12/08/21 0848  •  lactobacillus acidophilus (RISAQUAD) capsule 1 capsule, 1 capsule, Oral, Daily, Horacio Lee DO, 1 capsule at 12/08/21 0848  •  magnesium oxide (MAG-OX) tablet 400 mg, 400 mg, Oral, Daily, Horacio Lee DO, 400 mg at 12/08/21 0848  •  melatonin tablet 5 mg, 5 mg, Oral, Nightly PRN, Barrios, Birrilla, DO  •  multivitamin with minerals 1 tablet, 1 tablet, Oral, Daily, Horacio Lee DO, 1 tablet at 12/08/21 0848  •  nitroglycerin (NITROSTAT) SL tablet 0.4 mg, 0.4 mg, Sublingual, Q5 Min PRN, Barrios, Birrilla, DO  •  ondansetron (ZOFRAN) tablet 4 mg, 4 mg, Oral, Q6H PRN **OR** ondansetron (ZOFRAN) injection 4 mg, 4 mg, Intravenous, Q6H PRN, Marcello Barrios DO  •  pantoprazole (PROTONIX) EC tablet 40 mg, 40 mg, Oral, Daily, Horacio Lee DO, 40 mg at 12/08/21 0848  •  pravastatin (PRAVACHOL) tablet 20 mg, 20 mg, Oral, Nightly, Horacio Lee DO, 20 mg at 12/07/21 2123  •  [COMPLETED] Insert peripheral IV, , , Once **AND** sodium chloride 0.9 % flush 10 mL,  10 mL, Intravenous, PRN, Barrios, Birrilla, DO  •  sodium chloride 0.9 % flush 10 mL, 10 mL, Intravenous, PRN, Barrios, Birrilla, DO  •  sodium chloride 0.9 % flush 10 mL, 10 mL, Intravenous, Q12H, Barrios, Birrilla, DO, 10 mL at 12/08/21 0848  •  sodium chloride 0.9 % infusion 40 mL, 40 mL, Intravenous, PRN, Barrios, Birrilla, DO      Assessment/Plan     1.  Acute Hypoxic Respiratory Failure due to COVID-19: Declined Baricitinib, but probably not a good candidate given DVT.  Discussed w/ Dr. Blas and appreciate his help.  Wean O2 as able and continue Decadron.  Increase Vitamin C to 1,000mg.    2.  Acute DVT, RLE: Contiue Lovenox dosing.    Plan for disposition: Predicated on hospital course.    Gt Rogel MD  12/08/21  19:43 EST

## 2021-12-09 NOTE — PROGRESS NOTES
LOS: 5 days   Patient Care Team:  Nav Carmona MD as PCP - General (Family Medicine)  Stacia, Harvinder Awan MD as Consulting Physician (Gastroenterology)  Tyler Jean MD as Consulting Physician (Orthopedic Surgery)    Subjective     Patient notes he is doing pretty well he is not having any pain no significant cough no nausea he is trying to eat more and says he did pretty well yesterday he says he spent most of the day up in a chair yesterday he is moving his legs     Review of Systems:          Objective     Vital Signs  Vital Sign Min/Max for last 24 hours  Temp  Min: 97.5 °F (36.4 °C)  Max: 98.5 °F (36.9 °C)   BP  Min: 129/71  Max: 140/78   Pulse  Min: 59  Max: 101   Resp  Min: 16  Max: 20   SpO2  Min: 91 %  Max: 94 %   Flow (L/min)  Min: 5  Max: 15   No data recorded        Ventilator/Non-Invasive Ventilation Settings (From admission, onward)            None                       Body mass index is 25.55 kg/m².  I/O last 3 completed shifts:  In: 2040 [P.O.:2040]  Out: 1550 [Urine:1550]  I/O this shift:  In: -   Out: 650 [Urine:650]        Physical Exam:    General Appearance: Well-developed white male he is resting comfortably in bed in no apparent distress he is on 12 L high flow nasal cannula oxygen saturations 93% at rest dropping to about 89% talking  Eyes: Conjunctiva are clear and anicteric  ENT: Mucous membranes are moist no erythema no exudates  Neck: No lymphadenopathy or thyromegaly  Lungs: Amazingly clear no wheezes no rales no rhonchi, chest expansion symmetric and nonlabored at rest on 12 L high flow O2  Cardiac: Regular rate rhythm no murmur heart rates in the 80s  Abdomen: Soft nontender no palpable hepatosplenomegaly or masses  : Not examined  Musculoskeletal: Grossly normal there is no calf tenderness or palpable cords  Skin: No jaundice no petechia, skin is warm and dry  Neuro: Alert and oriented cooperative following commands moving all extremities and grossly  intact  Extremities/P Vascular: No clubbing no cyanosis no edema palpable radial and dorsalis pedis pulses  MSE: He seems to be in pretty good spirits     Labs:  Results from last 7 days   Lab Units 12/08/21 0410 12/05/21 0528 12/04/21 2034   GLUCOSE mg/dL 189* 143* 119*   SODIUM mmol/L 134* 136 135*   POTASSIUM mmol/L 4.9 4.6 4.3   CO2 mmol/L 21.5* 20.5* 23.6   CHLORIDE mmol/L 101 103 99   ANION GAP mmol/L 11.5 12.5 12.4   CREATININE mg/dL 0.98 0.97 1.15   BUN mg/dL 23 16 18   BUN / CREAT RATIO  23.5 16.5 15.7   CALCIUM mg/dL 8.6 8.7 8.8   EGFR IF NONAFRICN AM mL/min/1.73 75 76 62   ALK PHOS U/L 91 77 88   TOTAL PROTEIN g/dL 6.4 6.1 6.5   ALT (SGPT) U/L 497* 489* 627*   AST (SGOT) U/L 141* 129* 220*   BILIRUBIN mg/dL 0.5 0.5 0.6   ALBUMIN g/dL 2.90* 2.90* 3.30*   GLOBULIN gm/dL 3.5 3.2 3.2     Estimated Creatinine Clearance: 74.5 mL/min (by C-G formula based on SCr of 0.98 mg/dL).      Results from last 7 days   Lab Units 12/08/21 0410 12/05/21 0528 12/04/21 2034   WBC 10*3/mm3 11.61* 8.50 9.58   RBC 10*6/mm3 4.83 4.40 4.70   HEMOGLOBIN g/dL 13.5 12.4* 13.4   HEMATOCRIT % 41.6 38.1 40.5   MCV fL 86.1 86.6 86.2   MCH pg 28.0 28.2 28.5   MCHC g/dL 32.5 32.5 33.1   RDW % 15.9* 16.5* 16.4*   RDW-SD fl 50.0 52.5 51.8   MPV fL 10.0 9.9 9.5   PLATELETS 10*3/mm3 275 268 263   NEUTROPHIL % % 92.1* 90.9* 88.0*   LYMPHOCYTE % % 3.8* 5.2* 7.9*   MONOCYTES % % 2.9* 3.4* 3.2*   EOSINOPHIL % % 0.1* 0.0* 0.1*   BASOPHIL % % 0.2 0.0 0.1   IMM GRAN % % 0.9* 0.5 0.7*   NEUTROS ABS 10*3/mm3 10.69* 7.73* 8.42*   LYMPHS ABS 10*3/mm3 0.44* 0.44* 0.76   MONOS ABS 10*3/mm3 0.34 0.29 0.31   EOS ABS 10*3/mm3 0.01 0.00 0.01   BASOS ABS 10*3/mm3 0.02 0.00 0.01   IMMATURE GRANS (ABS) 10*3/mm3 0.11* 0.04 0.07*   NRBC /100 WBC 0.0 0.0 0.0         Results from last 7 days   Lab Units 12/04/21 2034   TROPONIN T ng/mL <0.010     Results from last 7 days   Lab Units 12/04/21 2034   PROBNP pg/mL 521.2         Results from last 7 days   Lab  Units 12/08/21  0410 12/07/21  0352 12/05/21  0528 12/04/21  2034   PROCALCITONIN ng/mL 0.16 0.16 0.15 0.14         Microbiology Results (last 10 days)     Procedure Component Value - Date/Time    Rapid Strep A Screen - Swab, Throat [[id number]]  (Normal) Collected: 11/30/21 1837    Lab Status: Final result Specimen: Swab from Throat Updated: 11/30/21 1855     Strep A Ag Negative    Beta Strep Culture, Throat - Swab, Throat [[id number]]  (Normal) Collected: 11/30/21 1837    Lab Status: Final result Specimen: Swab from Throat Updated: 12/02/21 1345     Throat Culture, Beta Strep No Beta Hemolytic Streptococcus Isolated    Narrative:      Group A Strep incidence is low in adults. Positive culture for Beta hemolytic Streptococcus species can reflect colonization and not true infection. Please correlate clinically.    Blood Culture - Blood, Arm, Right [[id number]]  (Normal) Collected: 11/30/21 1835    Lab Status: Final result Specimen: Blood from Arm, Right Updated: 12/05/21 1845     Blood Culture No growth at 5 days    Blood Culture - Blood, Arm, Right [[id number]]  (Normal) Collected: 11/30/21 1820    Lab Status: Final result Specimen: Blood from Arm, Right Updated: 12/05/21 1845     Blood Culture No growth at 5 days    COVID-19 and FLU A/B PCR - Swab, Nasopharynx [[id number]]  (Abnormal) Collected: 11/30/21 1749    Lab Status: Final result Specimen: Swab from Nasopharynx Updated: 11/30/21 1823     COVID19 Detected     Influenza A PCR Not Detected     Influenza B PCR Not Detected    Narrative:      Fact sheet for providers: https://www.fda.gov/media/976390/download    Fact sheet for patients: https://www.fda.gov/media/553686/download    Test performed by PCR.  Influenza A and Influenza B negative results should be considered presumptive in samples that have a positive SARS-CoV-2 result.    Competitive inhibition studies showed that SARS-CoV-2 virus, when present at concentrations above 3.6E+04 copies/mL, can inhibit the  detection and amplification of influenza A and influenza B virus RNA if present at or below 1.8E+02 copies/mL or 4.9E+02 copies/mL, respectively, and may lead to false negative influenza virus results. If co-infection with influenza A or influenza B virus is suspected in samples with a positive SARS-CoV-2 result, the sample should be re-tested with another FDA cleared, approved, or authorized influenza test, if influenza virus detection would change clinical management.              ascorbic acid, 1,000 mg, Oral, BID  dexamethasone, 8 mg, Oral, Q12H  enoxaparin, 1 mg/kg, Subcutaneous, Q12H  ferrous gluconate, 324 mg, Oral, Daily With Breakfast  lactobacillus acidophilus, 1 capsule, Oral, Daily  magnesium oxide, 400 mg, Oral, Daily  multivitamin with minerals, 1 tablet, Oral, Daily  pantoprazole, 40 mg, Oral, Daily  pravastatin, 20 mg, Oral, Nightly  sodium chloride, 10 mL, Intravenous, Q12H           Diagnostics:  CT Angiogram Chest With & Without Contrast    Result Date: 12/7/2021  CT ANGIOGRAM CHEST W WO CONTRAST INDICATION: Hypoxemia, elevated d-dimer TECHNIQUE: CT angiogram of the chest with IV contrast. 3-D reconstructions were obtained and reviewed.   Radiation dose reduction techniques included automated exposure control or exposure modulation based on body size. Count of known CT and cardiac nuc med studies performed in previous 12 months: 2. COMPARISON: 12/4/2021 FINDINGS: There are stable large round dense infiltrates throughout the lungs the aorta is normal in size. There is optimal opacification of the pulmonary arteries and there is no CT evidence of pulmonary embolus. There is no adenopathy. Upper abdominal images are unremarkable. Bones are unremarkable. At     1. Negative for pulmonary embolus. 2. No change in bilateral groundglass infiltrates Signer Name: Vance Leonard MD  Signed: 12/7/2021 2:07 AM  Workstation Name: [facility]  [facility]    XR Chest 1 View    Result Date:  11/30/2021  PROCEDURE: CR Chest 1 Vw COMPARISON:  No relevant comparison or correlation studies available at time of dictation. INDICATIONS: Evaluate for pneumonia Relevant clinical info: Cough, SOA, fever/chills x 1 week. Pt is COVID19 positive. Has hx of Mi/heart cath TECHNIQUE: Single AP  view of the chest FINDINGS:  Cardiomediastinal silhouette is within normal limits given projection.  Lungs are relatively well inflated but, in the right mid lung in the region of the inferior aspect of the scapula there is slight increased opacity in the lungs that is questionable for developing infiltrate. A similar corresponding areas seen in the left lung. No consolidation and no hemidiaphragm elevation. Osseous structures are intact. Hardware is seen partially in the left clavicle     Questionable subtle bilateral midlung infiltrates versus artifact, single view. Consider follow-up 2 view chest x-ray.  Signer Name: Brielle Johnson MD  Signed: 11/30/2021 7:28 PM  Workstation Name: [facility]  [facility] Venous Doppler Lower Extremity Bilateral (duplex)    Result Date: 12/7/2021  VENOUS DOPPLER ULTRASOUND, BILATERAL LOWER EXTREMITIES, 12/07/2021  HISTORY: 71-year-old male hospital inpatient with Covid 19 pneumonia and elevated d-dimer.  TECHNIQUE: Venous Doppler ultrasound examination of both legs was performed using grey-scale, spectral Doppler, and color flow Doppler ultrasound imaging.  FINDINGS: Examination shows bilateral calf vein thrombus. *  On the right, there is occlusive DVT within the mid and upper posterior tibial vein and within the mid and upper soleal vein. *  On the left, thrombus is present within the small saphenous vein.  There is no deep venous thrombosis or superficial venous thrombosis at, or above the knee bilaterally. Greater saphenous veins are patent.      1.  Right calf vein DVT and left calf SVT as noted above. 2.  No evidence of DVT or SVT above the calf level on  the right or left.  This report was finalized on 12/7/2021 11:50 AM by Dr. Gary Pettit MD.      CT Angiogram Chest    Result Date: 12/4/2021  CTA Chest INDICATION: Persistent cough, shortness of air, fever and chills for one half weeks. Covid positive patient TECHNIQUE: CT angiogram of the chest with IV contrast. 3-D reconstructions were obtained and reviewed.   Radiation dose reduction techniques included automated exposure control or exposure modulation based on body size. Count of known CT and cardiac nuc med studies performed in previous 12 months: 1. COMPARISON: 11/30/2021 FINDINGS: There are large areas of groundglass infiltrate throughout the lungs. This involves the posterior left upper lobe and most of both lower lobes and the lingula. The infiltrates are much more extensive than on the previous examination. There is adequate opacification of the pulmonary arteries and there is no CT evidence of pulmonary embolus. Aorta is normal in size. It is not opacified. Upper abdominal images are unremarkable. Bones are normal.     No CT evidence of pulmonary embolus Bilateral large groundglass infiltrates suggesting Covid 19 pneumonia. Infiltrates are more extensive than on the old study from 11/30/2021 Signer Name: Vance Leonard MD  Signed: 12/4/2021 10:17 PM  Workstation Name: [facility]  [facility] [place]    CT Angiogram Chest    Result Date: 11/30/2021  CTA Chest INDICATION: Elevated d-dimer and low oxygen saturation. Fever and chills for one week. Cough TECHNIQUE: CT angiogram of the chest with IV contrast. 3-D reconstructions were obtained and reviewed.   Radiation dose reduction techniques included automated exposure control or exposure modulation based on body size. Count of known CT and cardiac nuc med studies performed in previous 12 months: 0. COMPARISON: None available. FINDINGS: There are several patchy groundglass infiltrates throughout the lungs suggestive of Covid 19 pneumonia.  Airways are patent. Thyroid gland is normal. Aorta is normal in appearance. There is optimal opacification of the pulmonary arteries. There is no CT evidence of pulmonary embolus. Upper abdominal images are unremarkable. Bones are unremarkable.     Patchy bilateral groundglass infiltrates typical of Covid 19 pneumonia No CT evidence of pulmonary embolus Signer Name: Vance Leonard MD  Signed: 11/30/2021 8:31 PM  Workstation Name: [facility]  [facility] [place]            Active Hospital Problems    Diagnosis  POA   • Pneumonia due to COVID-19 virus [U07.1, J12.82]  Yes      Resolved Hospital Problems   No resolved problems to display.         Assessment/Plan     1. SARS-CoV-2 infection with COVID-19 syndrome and pneumonia patient is on Decadron 8 mg every 12 hours he has refused remdesivir and baricitinib.  Thankfully it looks like he is improving slightly  2. Pneumonia secondary to COVID-19 syndrome he really has not developed any dense infiltrates his white counts been pretty normal and his procalcitonin is normal I do not think at this time he has any secondary bacterial infections  3. Acute hypoxic respiratory failure secondary to above supplemental O2 will wean as tolerated.  He has been intolerant of CPAP in the past if he were to worsen we may have to move him to the unit for noninvasive ventilation with dexmedetomidine hopefully he will not get to that point looks like maybe he is showing a little sign of improvement..  4. DVT right calf on full dose Lovenox, no evidence of PE.  He may be able to transition to Eliquis in the near future.  5. Elevated transaminases he has a fairly significant elevation we see this not uncommonly with the Covid 19 syndrome it is a viral mediated hepatitis.  I would just continue to monitor his liver enzymes and they should continue to improve if they do not then will need further work-up.  6. Obstructive sleep apnea Pap intolerant  7. Gastroesophageal reflux  disease with history of multiple ulcers with him on high-dose steroids absolutely continue PPI  8. Coronary artery disease          Plan for disposition:    Bairon Blas Jr, MD  12/09/21  06:50 EST    Time:

## 2021-12-09 NOTE — PLAN OF CARE
Goal Outcome Evaluation:  Plan of Care Reviewed With: patient        Progress: no change  Outcome Summary: Continues at 11 L highflow. SR per telemetry. Appears to have slept well. Voiding. BM 12/7 per pt report.

## 2021-12-10 LAB
ALBUMIN SERPL-MCNC: 2.9 G/DL (ref 3.5–5.2)
ALBUMIN/GLOB SERPL: 0.9 G/DL
ALP SERPL-CCNC: 78 U/L (ref 39–117)
ALT SERPL W P-5'-P-CCNC: 532 U/L (ref 1–41)
ANION GAP SERPL CALCULATED.3IONS-SCNC: 13.8 MMOL/L (ref 5–15)
AST SERPL-CCNC: 107 U/L (ref 1–40)
BILIRUB SERPL-MCNC: 0.5 MG/DL (ref 0–1.2)
BUN SERPL-MCNC: 28 MG/DL (ref 8–23)
BUN/CREAT SERPL: 31.5 (ref 7–25)
CALCIUM SPEC-SCNC: 8.8 MG/DL (ref 8.6–10.5)
CHLORIDE SERPL-SCNC: 101 MMOL/L (ref 98–107)
CO2 SERPL-SCNC: 19.2 MMOL/L (ref 22–29)
CREAT SERPL-MCNC: 0.89 MG/DL (ref 0.76–1.27)
CRP SERPL-MCNC: 2.2 MG/DL (ref 0–0.5)
FERRITIN SERPL-MCNC: 487 NG/ML (ref 30–400)
GFR SERPL CREATININE-BSD FRML MDRD: 84 ML/MIN/1.73
GLOBULIN UR ELPH-MCNC: 3.1 GM/DL
GLUCOSE SERPL-MCNC: 166 MG/DL (ref 65–99)
POTASSIUM SERPL-SCNC: 4.7 MMOL/L (ref 3.5–5.2)
PROT SERPL-MCNC: 6 G/DL (ref 6–8.5)
SODIUM SERPL-SCNC: 134 MMOL/L (ref 136–145)

## 2021-12-10 PROCEDURE — 80053 COMPREHEN METABOLIC PANEL: CPT | Performed by: INTERNAL MEDICINE

## 2021-12-10 PROCEDURE — 25010000002 ENOXAPARIN PER 10 MG: Performed by: HOSPITALIST

## 2021-12-10 PROCEDURE — 94799 UNLISTED PULMONARY SVC/PX: CPT

## 2021-12-10 PROCEDURE — 94761 N-INVAS EAR/PLS OXIMETRY MLT: CPT

## 2021-12-10 PROCEDURE — 99232 SBSQ HOSP IP/OBS MODERATE 35: CPT | Performed by: HOSPITALIST

## 2021-12-10 PROCEDURE — 86140 C-REACTIVE PROTEIN: CPT | Performed by: INTERNAL MEDICINE

## 2021-12-10 PROCEDURE — 63710000001 DEXAMETHASONE PER 0.25 MG: Performed by: INTERNAL MEDICINE

## 2021-12-10 PROCEDURE — 82728 ASSAY OF FERRITIN: CPT | Performed by: INTERNAL MEDICINE

## 2021-12-10 RX ADMIN — OXYCODONE HYDROCHLORIDE AND ACETAMINOPHEN 1000 MG: 500 TABLET ORAL at 09:23

## 2021-12-10 RX ADMIN — ENOXAPARIN SODIUM 80 MG: 80 INJECTION SUBCUTANEOUS at 22:01

## 2021-12-10 RX ADMIN — ENOXAPARIN SODIUM 80 MG: 80 INJECTION SUBCUTANEOUS at 11:56

## 2021-12-10 RX ADMIN — MULTIPLE VITAMINS W/ MINERALS TAB 1 TABLET: TAB at 09:24

## 2021-12-10 RX ADMIN — SODIUM CHLORIDE, PRESERVATIVE FREE 10 ML: 5 INJECTION INTRAVENOUS at 09:24

## 2021-12-10 RX ADMIN — Medication 324 MG: at 09:24

## 2021-12-10 RX ADMIN — Medication 400 MG: at 09:23

## 2021-12-10 RX ADMIN — PANTOPRAZOLE SODIUM 40 MG: 40 TABLET, DELAYED RELEASE ORAL at 09:24

## 2021-12-10 RX ADMIN — Medication 1 CAPSULE: at 09:24

## 2021-12-10 RX ADMIN — DEXAMETHASONE 8 MG: 4 TABLET ORAL at 22:00

## 2021-12-10 RX ADMIN — Medication 5 MG: at 22:00

## 2021-12-10 RX ADMIN — DEXAMETHASONE 8 MG: 4 TABLET ORAL at 09:24

## 2021-12-10 RX ADMIN — OXYCODONE HYDROCHLORIDE AND ACETAMINOPHEN 1000 MG: 500 TABLET ORAL at 22:00

## 2021-12-10 RX ADMIN — SODIUM CHLORIDE, PRESERVATIVE FREE 10 ML: 5 INJECTION INTRAVENOUS at 22:01

## 2021-12-10 NOTE — PLAN OF CARE
Goal Outcome Evaluation:  Plan of Care Reviewed With: patient        Progress: improving  Outcome Summary: Oxygen titrated down throughout the shift. Telemetry discontinued. Pt up to chair, ambulating independently and repositioning frequently. Continue PO Vitamin C. Continue PO dexamethasone. Tolerating diet w/o complaints. Voiding w/o difficulty.

## 2021-12-10 NOTE — CASE MANAGEMENT/SOCIAL WORK
Continued Stay Note  [facility]     Patient Name: Perez Bolaños  MRN: [id number]  Today's Date: 12/10/2021    Admit Date: 12/4/2021     Discharge Plan     Row Name 12/10/21 1635       Plan    Plan plan home w wife    Plan Comments Per chart review, patient slowly improving. Currently 02 @ HF 6L. CM will continue to follow for dc needs.               Discharge Codes    No documentation.                     Noah Banegas RN

## 2021-12-10 NOTE — PROGRESS NOTES
"Hospitalist Team      Patient Care Team:  Nav Carmona MD as PCP - General (Family Medicine)  Stacia, Harvinder Awan MD as Consulting Physician (Gastroenterology)  Tyler Jean MD as Consulting Physician (Orthopedic Surgery)      Chief Complaint:  Follow-up Acute Hypoxic Respiratory Failure due to COVID-19 pneumonia    Subjective    Doing well.  Feels well.  Anxious to go home.    Objective    Vital Signs  Temp:  [97.3 °F (36.3 °C)-98.2 °F (36.8 °C)] 97.8 °F (36.6 °C)  Heart Rate:  [57-76] 75  Resp:  [16-20] 18  BP: (122-137)/(70-78) 133/73  Oxygen Therapy  SpO2: 92 %  Pulse Oximetry Type: Continuous  Device (Oxygen Therapy): humidified, high-flow nasal cannula  Flow (L/min): 6  Oxygen Concentration (%): 89  Probe Placed On (Pulse Ox): Left:, finger}    Flowsheet Rows      First Filed Value   Admission Height 175.3 cm (69\") Documented at 12/04/2021 2335   Admission Weight 78.5 kg (173 lb) Documented at 12/04/2021 2335        Physical Exam:    General: NAD  Lungs: Clear  CV: Regular w/o murmur  Abdomen: Soft and non-tender w/ active bowel sounds.  MSK: No C/C/E.  Neuro: CN II-XII grossly intact.  Psych: Pleasant affect.    Results Review:     I reviewed the patient's new clinical results.    Lab Results (last 24 hours)     Procedure Component Value Units Date/Time    C-reactive Protein [[id number]]  (Abnormal) Collected: 12/10/21 0540    Specimen: Blood Updated: 12/10/21 1135     C-Reactive Protein 2.20 mg/dL     Ferritin [[id number]]  (Abnormal) Collected: 12/10/21 0540    Specimen: Blood Updated: 12/10/21 0655     Ferritin 487.00 ng/mL     Narrative:      Results may be falsely decreased if patient taking Biotin.      Comprehensive Metabolic Panel [[id number]]  (Abnormal) Collected: 12/10/21 0540    Specimen: Blood Updated: 12/10/21 0655     Glucose 166 mg/dL      BUN 28 mg/dL      Creatinine 0.89 mg/dL      Sodium 134 mmol/L      Potassium 4.7 mmol/L      Chloride 101 mmol/L      CO2 19.2 mmol/L  "     Calcium 8.8 mg/dL      Total Protein 6.0 g/dL      Albumin 2.90 g/dL      ALT (SGPT) 532 U/L      AST (SGOT) 107 U/L      Alkaline Phosphatase 78 U/L      Total Bilirubin 0.5 mg/dL      eGFR Non African Amer 84 mL/min/1.73      Globulin 3.1 gm/dL      A/G Ratio 0.9 g/dL      BUN/Creatinine Ratio 31.5     Anion Gap 13.8 mmol/L     Narrative:      GFR Normal >60  Chronic Kidney Disease <60  Kidney Failure <15            Imaging Results (Last 24 Hours)     ** No results found for the last 24 hours. **          Medication Review:   I have reviewed the patient's current medication list    Current Facility-Administered Medications:   •  ascorbic acid (VITAMIN C) tablet 1,000 mg, 1,000 mg, Oral, BID, Gt Rogel MD, 1,000 mg at 12/10/21 0923  •  chlorpheniramine (CHLOR-TRIMETON) tablet 4 mg, 4 mg, Oral, Q6H PRN, Horacio Lee, DO  •  dexamethasone (DECADRON) tablet 8 mg, 8 mg, Oral, Q12H, Nav Olivas DO, 8 mg at 12/10/21 0924  •  enoxaparin (LOVENOX) syringe 80 mg, 1 mg/kg, Subcutaneous, Q12H, Gt Rogel MD, 80 mg at 12/10/21 1156  •  ferrous gluconate tablet 324 mg, 324 mg, Oral, Daily With Breakfast, Horacio Lee, DO, 324 mg at 12/10/21 0924  •  lactobacillus acidophilus (RISAQUAD) capsule 1 capsule, 1 capsule, Oral, Daily, Horacio Lee, DO, 1 capsule at 12/10/21 0924  •  magnesium oxide (MAG-OX) tablet 400 mg, 400 mg, Oral, Daily, Horacio Lee DO, 400 mg at 12/10/21 0923  •  melatonin tablet 5 mg, 5 mg, Oral, Nightly PRN, Marcello Barrios, DO  •  multivitamin with minerals 1 tablet, 1 tablet, Oral, Daily, Horacio Lee, DO, 1 tablet at 12/10/21 0924  •  nitroglycerin (NITROSTAT) SL tablet 0.4 mg, 0.4 mg, Sublingual, Q5 Min PRN, Denis, Kishaa, DO  •  ondansetron (ZOFRAN) tablet 4 mg, 4 mg, Oral, Q6H PRN **OR** ondansetron (ZOFRAN) injection 4 mg, 4 mg, Intravenous, Q6H PRN, Denis, Marcello, DO  •  pantoprazole (PROTONIX) EC tablet 40 mg, 40 mg, Oral, Daily, Horacio Lee, DO, 40 mg at  12/10/21 0924  •  pravastatin (PRAVACHOL) tablet 20 mg, 20 mg, Oral, Nightly, Horacio Lee, DO, 20 mg at 12/09/21 9549  •  [COMPLETED] Insert peripheral IV, , , Once **AND** sodium chloride 0.9 % flush 10 mL, 10 mL, Intravenous, PRN, Barrios, Birrilla, DO  •  sodium chloride 0.9 % flush 10 mL, 10 mL, Intravenous, PRN, Barrios, Birrilla, DO  •  sodium chloride 0.9 % flush 10 mL, 10 mL, Intravenous, Q12H, Barrios, Birrilla, DO, 10 mL at 12/10/21 0924  •  sodium chloride 0.9 % infusion 40 mL, 40 mL, Intravenous, PRN, Barrios, Birrilla, DO      Assessment/Plan     1.  Acute Hypoxic Respiratory Failure due to COVID-19 Pneumonia: Not sure why his O2 was increased overnight, but he has been appropriate on previous O2 dose today.  Continue to wean as able.    2.  Transaminitis: Likely due to COVID-19.  Pravachol is renally cleared.  Will monitor.    3.  GERD: Continue PPI.    4.  Right Calf DVT: Continue Lovenox.    Plan for disposition: Home when able.    Gt Rogel MD  12/10/21  15:57 EST

## 2021-12-10 NOTE — PROGRESS NOTES
"Hospitalist Team      Patient Care Team:  Nav Carmona MD as PCP - General (Family Medicine)  Stacia, Harvinder Awan MD as Consulting Physician (Gastroenterology)  Tyler Jean MD as Consulting Physician (Orthopedic Surgery)      Chief Complaint: Follow-up Acute Hypoxic Respiratory Failure due to COVID; DVT    Subjective    Continues to do well!  O2 continues to be weaned.  His breathing is better when ambulating around the room.  He denies chest pain.  Tolerating PO.    Objective    Vital Signs  Temp:  [97.5 °F (36.4 °C)-98.5 °F (36.9 °C)] 98.2 °F (36.8 °C)  Heart Rate:  [] 79  Resp:  [16-20] 20  BP: (129-140)/(71-78) 134/77  Oxygen Therapy  SpO2: 94 %  Pulse Oximetry Type: Continuous  Device (Oxygen Therapy): high-flow nasal cannula  Flow (L/min): 8  Probe Placed On (Pulse Ox): finger, Left:}    Flowsheet Rows      First Filed Value   Admission Height 175.3 cm (69\") Documented at 12/04/2021 2335   Admission Weight 78.5 kg (173 lb) Documented at 12/04/2021 2335          Physical Exam:    General: Appears stated age in NAD  Lungs: Clear breath sounds throughout all fields.  Normal excursion.  CV: Regular rate and rhythm.  No murmur appreciated.  Radial pulses are 2+ and symmetric.  Abdomen: Soft and non-tender w/ active bowel sounds.  MSK: No C/C/E.  Skin: No evidence of embolic phenomena.  Neuro: CN II-XII grossly intact.  Psych: Pleasant affect Ox3.    Results Review:     I reviewed the patient's new clinical results.    Lab Results (last 24 hours)     Procedure Component Value Units Date/Time    C-reactive Protein [[id number]]  (Abnormal) Collected: 12/09/21 0417    Specimen: Blood Updated: 12/09/21 1341     C-Reactive Protein 2.05 mg/dL     Ferritin [[id number]]  (Abnormal) Collected: 12/09/21 0417    Specimen: Blood Updated: 12/09/21 0532     Ferritin 493.00 ng/mL     Narrative:      Results may be falsely decreased if patient taking Biotin.            Imaging Results (Last 24 Hours)     " ** No results found for the last 24 hours. **          Medication Review:   I have reviewed the patient's current medication list    Current Facility-Administered Medications:   •  ascorbic acid (VITAMIN C) tablet 1,000 mg, 1,000 mg, Oral, BID, Gt Rogel MD, 1,000 mg at 12/09/21 0904  •  chlorpheniramine (CHLOR-TRIMETON) tablet 4 mg, 4 mg, Oral, Q6H PRN, Horacio Lee, DO  •  dexamethasone (DECADRON) tablet 8 mg, 8 mg, Oral, Q12H, Nav Olivas DO, 8 mg at 12/09/21 0904  •  enoxaparin (LOVENOX) syringe 80 mg, 1 mg/kg, Subcutaneous, Q12H, Gt Rogel MD, 80 mg at 12/09/21 1123  •  ferrous gluconate tablet 324 mg, 324 mg, Oral, Daily With Breakfast, Horacio Lee, DO, 324 mg at 12/09/21 0904  •  lactobacillus acidophilus (RISAQUAD) capsule 1 capsule, 1 capsule, Oral, Daily, Horacio Lee, DO, 1 capsule at 12/09/21 0904  •  magnesium oxide (MAG-OX) tablet 400 mg, 400 mg, Oral, Daily, Horacio Lee, DO, 400 mg at 12/09/21 0904  •  melatonin tablet 5 mg, 5 mg, Oral, Nightly PRN, Barrios, Birrilla, DO  •  multivitamin with minerals 1 tablet, 1 tablet, Oral, Daily, Horacio Lee, DO, 1 tablet at 12/09/21 0904  •  nitroglycerin (NITROSTAT) SL tablet 0.4 mg, 0.4 mg, Sublingual, Q5 Min PRN, Barrios, Birrilla, DO  •  ondansetron (ZOFRAN) tablet 4 mg, 4 mg, Oral, Q6H PRN **OR** ondansetron (ZOFRAN) injection 4 mg, 4 mg, Intravenous, Q6H PRN, Barrios, Birrilla, DO  •  pantoprazole (PROTONIX) EC tablet 40 mg, 40 mg, Oral, Daily, Horacio Lee, DO, 40 mg at 12/09/21 0904  •  pravastatin (PRAVACHOL) tablet 20 mg, 20 mg, Oral, Nightly, Horacio Lee, DO, 20 mg at 12/08/21 2143  •  [COMPLETED] Insert peripheral IV, , , Once **AND** sodium chloride 0.9 % flush 10 mL, 10 mL, Intravenous, PRN, Barrios, Birrilla, DO  •  sodium chloride 0.9 % flush 10 mL, 10 mL, Intravenous, PRN, Barrios, Birrilla, DO  •  sodium chloride 0.9 % flush 10 mL, 10 mL, Intravenous, Q12H, Barrios, Birrilla, DO, 10 mL at 12/09/21 0904  •  sodium  chloride 0.9 % infusion 40 mL, 40 mL, Intravenous, PRN, Barrios, Birrilla, DO      Assessment/Plan     1.  Acute Hypoxic Respiratory Failure due to COVID-19 pneumonia: Looks great, clinically.  I weaned him down to 7L.  Continue current regimen.    2.  Right Calf DVT: on Lovenox.    Plan for disposition: Home when able.    Gt Rogel MD  12/09/21  19:20 EST

## 2021-12-10 NOTE — PROGRESS NOTES
Adult Nutrition  Assessment/PES    Patient Name:  Perez Bolaños  YOB: 1950  MRN: [id number]  Admit Date:  12/4/2021    Assessment Date:  12/10/2021    Comments:  Good po intake. Will cont to follow and monitor.      Reason for Assessment     Row Name 12/10/21 1450          Reason for Assessment    Reason For Assessment follow-up protocol     Diagnosis pulmonary disease; infection/sepsis  COVID PNA AHRF DVT                Nutrition/Diet History     Row Name 12/10/21 1450          Nutrition/Diet History    Typical Food/Fluid Intake pt tolerating po, improved O2 some                  Labs/Tests/Procedures/Meds     Row Name 12/10/21 1450          Labs/Procedures/Meds    Lab Results Reviewed reviewed     Lab Results Comments BUn 28 H, glu 189            Diagnostic Tests/Procedures    Diagnostic Test/Procedure Reviewed reviewed            Medications    Pertinent Medications Reviewed reviewed     Pertinent Medications Comments Vitamin C, decadron, iron, magox, MVT, risaquad                    Nutrition Prescription Ordered     Row Name 12/10/21 1451          Nutrition Prescription PO    Current PO Diet Regular                Evaluation of Received Nutrient/Fluid Intake     Row Name 12/10/21 1451          Fluid Intake Evaluation    Oral Fluid (mL) 913  ave x 3, 48%            PO Evaluation    Number of Meals 9     % PO Intake 78                     Problem/Interventions:   Problem 1     Row Name 12/10/21 1452          Nutrition Diagnoses Problem 1    Problem 1 Predicted Suboptimal Intake     Etiology (related to) Medical Diagnosis; Factors Affecting Nutrition     Signs/Symptoms (evidenced by) Report/Observation     Resolved? Yes                      Intervention Goal     Row Name 12/10/21 1452          Intervention Goal    General Meet nutritional needs for age/condition     PO Maintain intake; PO intake (%)     PO Intake % 75 %  or greater     Weight No significant weight loss                Nutrition  Intervention     Row Name 12/10/21 1452          Nutrition Intervention    RD/Tech Action Follow Tx progress                  Education/Evaluation     Row Name 12/10/21 1452          Education    Education No discharge needs identified at this time            Monitor/Evaluation    Monitor Per protocol; I&O; PO intake; Pertinent labs; Weight                 Electronically signed by:  Jeanette Vasquez RD  12/10/21 14:52 EST

## 2021-12-10 NOTE — PLAN OF CARE
Goal Outcome Evaluation:  Plan of Care Reviewed With: patient        Progress: no change  Outcome Summary: O2 increased to 11 L high flow throughout the night for sats 87% on 7-9 L. No reports of shortness of breath when o2 sats low. History of sleep apnea. SB - SR per telemetry. Voiding. Pt reports bm 12/8.

## 2021-12-10 NOTE — PROGRESS NOTES
LOS: 6 days   Patient Care Team:  Nav Carmona MD as PCP - General (Family Medicine)  Stacia, Harvinder Awan MD as Consulting Physician (Gastroenterology)  Tyler Jean MD as Consulting Physician (Orthopedic Surgery)    Subjective     Events overnight noted he was weaned to 7 L O2 yesterday but had a titrating back up to 9 to 11 L to maintain his saturations.  Patient really did not notice any difference.  Minimal cough minimal sputum no chest pain no nausea, eating pretty well spent most of the day up in a chair.  Says he just cannot stay in the hospital much longer.  Review of Systems:          Objective     Vital Signs  Vital Sign Min/Max for last 24 hours  Temp  Min: 97.3 °F (36.3 °C)  Max: 98.3 °F (36.8 °C)   BP  Min: 122/73  Max: 137/78   Pulse  Min: 57  Max: 81   Resp  Min: 16  Max: 20   SpO2  Min: 89 %  Max: 95 %   Flow (L/min)  Min: 7  Max: 11   No data recorded        Ventilator/Non-Invasive Ventilation Settings (From admission, onward)            None                       Body mass index is 25.55 kg/m².  I/O last 3 completed shifts:  In: 1680 [P.O.:1680]  Out: 1600 [Urine:1600]  I/O this shift:  In: 100 [P.O.:100]  Out: 550 [Urine:550]        Physical Exam:    General Appearance: Well-developed white male he is resting comfortably in bed in no apparent distress he is on 11 L high flow nasal cannula oxygen saturations 91% at rest dropping to about 90% talking  Eyes: Conjunctiva are clear and anicteric  ENT: Mucous membranes are moist no erythema no exudates  Neck: No lymphadenopathy or thyromegaly  Lungs: Amazingly clear no wheezes no rales no rhonchi, chest expansion symmetric and nonlabored at rest on 11 L high flow O2  Cardiac: Regular rate rhythm no murmur heart rates in the 80s  Abdomen: Soft nontender no palpable hepatosplenomegaly or masses  : Not examined  Musculoskeletal: Grossly normal there is no calf tenderness or palpable cords  Skin: No jaundice no petechia,  skin is warm and dry  Neuro: Alert and oriented cooperative following commands moving all extremities and grossly intact  Extremities/P Vascular: No clubbing no cyanosis no edema palpable radial and dorsalis pedis pulses  MSE:  Seems to be a little depressed today    Labs:  Results from last 7 days   Lab Units 12/08/21 0410 12/05/21 0528 12/04/21 2034   GLUCOSE mg/dL 189* 143* 119*   SODIUM mmol/L 134* 136 135*   POTASSIUM mmol/L 4.9 4.6 4.3   CO2 mmol/L 21.5* 20.5* 23.6   CHLORIDE mmol/L 101 103 99   ANION GAP mmol/L 11.5 12.5 12.4   CREATININE mg/dL 0.98 0.97 1.15   BUN mg/dL 23 16 18   BUN / CREAT RATIO  23.5 16.5 15.7   CALCIUM mg/dL 8.6 8.7 8.8   EGFR IF NONAFRICN AM mL/min/1.73 75 76 62   ALK PHOS U/L 91 77 88   TOTAL PROTEIN g/dL 6.4 6.1 6.5   ALT (SGPT) U/L 497* 489* 627*   AST (SGOT) U/L 141* 129* 220*   BILIRUBIN mg/dL 0.5 0.5 0.6   ALBUMIN g/dL 2.90* 2.90* 3.30*   GLOBULIN gm/dL 3.5 3.2 3.2     Estimated Creatinine Clearance: 74.5 mL/min (by C-G formula based on SCr of 0.98 mg/dL).      Results from last 7 days   Lab Units 12/08/21 0410 12/05/21 0528 12/04/21 2034   WBC 10*3/mm3 11.61* 8.50 9.58   RBC 10*6/mm3 4.83 4.40 4.70   HEMOGLOBIN g/dL 13.5 12.4* 13.4   HEMATOCRIT % 41.6 38.1 40.5   MCV fL 86.1 86.6 86.2   MCH pg 28.0 28.2 28.5   MCHC g/dL 32.5 32.5 33.1   RDW % 15.9* 16.5* 16.4*   RDW-SD fl 50.0 52.5 51.8   MPV fL 10.0 9.9 9.5   PLATELETS 10*3/mm3 275 268 263   NEUTROPHIL % % 92.1* 90.9* 88.0*   LYMPHOCYTE % % 3.8* 5.2* 7.9*   MONOCYTES % % 2.9* 3.4* 3.2*   EOSINOPHIL % % 0.1* 0.0* 0.1*   BASOPHIL % % 0.2 0.0 0.1   IMM GRAN % % 0.9* 0.5 0.7*   NEUTROS ABS 10*3/mm3 10.69* 7.73* 8.42*   LYMPHS ABS 10*3/mm3 0.44* 0.44* 0.76   MONOS ABS 10*3/mm3 0.34 0.29 0.31   EOS ABS 10*3/mm3 0.01 0.00 0.01   BASOS ABS 10*3/mm3 0.02 0.00 0.01   IMMATURE GRANS (ABS) 10*3/mm3 0.11* 0.04 0.07*   NRBC /100 WBC 0.0 0.0 0.0         Results from last 7 days   Lab Units 12/04/21 2034   TROPONIN T ng/mL <0.010      Results from last 7 days   Lab Units 12/04/21 2034   PROBNP pg/mL 521.2         Results from last 7 days   Lab Units 12/08/21  0410 12/07/21  0352 12/05/21  0528 12/04/21 2034   PROCALCITONIN ng/mL 0.16 0.16 0.15 0.14         Microbiology Results (last 10 days)     Procedure Component Value - Date/Time    Rapid Strep A Screen - Swab, Throat [[id number]]  (Normal) Collected: 11/30/21 1837    Lab Status: Final result Specimen: Swab from Throat Updated: 11/30/21 1855     Strep A Ag Negative    Beta Strep Culture, Throat - Swab, Throat [[id number]]  (Normal) Collected: 11/30/21 1837    Lab Status: Final result Specimen: Swab from Throat Updated: 12/02/21 1345     Throat Culture, Beta Strep No Beta Hemolytic Streptococcus Isolated    Narrative:      Group A Strep incidence is low in adults. Positive culture for Beta hemolytic Streptococcus species can reflect colonization and not true infection. Please correlate clinically.    Blood Culture - Blood, Arm, Right [[id number]]  (Normal) Collected: 11/30/21 1835    Lab Status: Final result Specimen: Blood from Arm, Right Updated: 12/05/21 1845     Blood Culture No growth at 5 days    Blood Culture - Blood, Arm, Right [[id number]]  (Normal) Collected: 11/30/21 1820    Lab Status: Final result Specimen: Blood from Arm, Right Updated: 12/05/21 1845     Blood Culture No growth at 5 days    COVID-19 and FLU A/B PCR - Swab, Nasopharynx [[id number]]  (Abnormal) Collected: 11/30/21 1749    Lab Status: Final result Specimen: Swab from Nasopharynx Updated: 11/30/21 1823     COVID19 Detected     Influenza A PCR Not Detected     Influenza B PCR Not Detected    Narrative:      Fact sheet for providers: https://www.fda.gov/media/498914/download    Fact sheet for patients: https://www.fda.gov/media/847069/download    Test performed by PCR.  Influenza A and Influenza B negative results should be considered presumptive in samples that have a positive SARS-CoV-2 result.    Competitive  inhibition studies showed that SARS-CoV-2 virus, when present at concentrations above 3.6E+04 copies/mL, can inhibit the detection and amplification of influenza A and influenza B virus RNA if present at or below 1.8E+02 copies/mL or 4.9E+02 copies/mL, respectively, and may lead to false negative influenza virus results. If co-infection with influenza A or influenza B virus is suspected in samples with a positive SARS-CoV-2 result, the sample should be re-tested with another FDA cleared, approved, or authorized influenza test, if influenza virus detection would change clinical management.              ascorbic acid, 1,000 mg, Oral, BID  dexamethasone, 8 mg, Oral, Q12H  enoxaparin, 1 mg/kg, Subcutaneous, Q12H  ferrous gluconate, 324 mg, Oral, Daily With Breakfast  lactobacillus acidophilus, 1 capsule, Oral, Daily  magnesium oxide, 400 mg, Oral, Daily  multivitamin with minerals, 1 tablet, Oral, Daily  pantoprazole, 40 mg, Oral, Daily  pravastatin, 20 mg, Oral, Nightly  sodium chloride, 10 mL, Intravenous, Q12H           Diagnostics:  CT Angiogram Chest With & Without Contrast    Result Date: 12/7/2021  CT ANGIOGRAM CHEST W WO CONTRAST INDICATION: Hypoxemia, elevated d-dimer TECHNIQUE: CT angiogram of the chest with IV contrast. 3-D reconstructions were obtained and reviewed.   Radiation dose reduction techniques included automated exposure control or exposure modulation based on body size. Count of known CT and cardiac nuc med studies performed in previous 12 months: 2. COMPARISON: 12/4/2021 FINDINGS: There are stable large round dense infiltrates throughout the lungs the aorta is normal in size. There is optimal opacification of the pulmonary arteries and there is no CT evidence of pulmonary embolus. There is no adenopathy. Upper abdominal images are unremarkable. Bones are unremarkable. At     1. Negative for pulmonary embolus. 2. No change in bilateral groundglass infiltrates Signer Name: Vance Leonard MD  Signed:  12/7/2021 2:07 AM  Workstation Name: [email or web address]-  [facility]    XR Chest 1 View    Result Date: 11/30/2021  PROCEDURE: CR Chest 1 Vw COMPARISON:  No relevant comparison or correlation studies available at time of dictation. INDICATIONS: Evaluate for pneumonia Relevant clinical info: Cough, SOA, fever/chills x 1 week. Pt is COVID19 positive. Has hx of Mi/heart cath TECHNIQUE: Single AP  view of the chest FINDINGS:  Cardiomediastinal silhouette is within normal limits given projection.  Lungs are relatively well inflated but, in the right mid lung in the region of the inferior aspect of the scapula there is slight increased opacity in the lungs that is questionable for developing infiltrate. A similar corresponding areas seen in the left lung. No consolidation and no hemidiaphragm elevation. Osseous structures are intact. Hardware is seen partially in the left clavicle     Questionable subtle bilateral midlung infiltrates versus artifact, single view. Consider follow-up 2 view chest x-ray.  Signer Name: Brielle Johnson MD  Signed: 11/30/2021 7:28 PM  Workstation Name: [email or web address]  [facility] Venous Doppler Lower Extremity Bilateral (duplex)    Result Date: 12/7/2021  VENOUS DOPPLER ULTRASOUND, BILATERAL LOWER EXTREMITIES, 12/07/2021  HISTORY: 71-year-old male hospital inpatient with Covid 19 pneumonia and elevated d-dimer.  TECHNIQUE: Venous Doppler ultrasound examination of both legs was performed using grey-scale, spectral Doppler, and color flow Doppler ultrasound imaging.  FINDINGS: Examination shows bilateral calf vein thrombus. *  On the right, there is occlusive DVT within the mid and upper posterior tibial vein and within the mid and upper soleal vein. *  On the left, thrombus is present within the small saphenous vein.  There is no deep venous thrombosis or superficial venous thrombosis at, or above the knee bilaterally. Greater saphenous veins are  patent.      1.  Right calf vein DVT and left calf SVT as noted above. 2.  No evidence of DVT or SVT above the calf level on the right or left.  This report was finalized on 12/7/2021 11:50 AM by Dr. Gary Pettit MD.      CT Angiogram Chest    Result Date: 12/4/2021  CTA Chest INDICATION: Persistent cough, shortness of air, fever and chills for one half weeks. Covid positive patient TECHNIQUE: CT angiogram of the chest with IV contrast. 3-D reconstructions were obtained and reviewed.   Radiation dose reduction techniques included automated exposure control or exposure modulation based on body size. Count of known CT and cardiac nuc med studies performed in previous 12 months: 1. COMPARISON: 11/30/2021 FINDINGS: There are large areas of groundglass infiltrate throughout the lungs. This involves the posterior left upper lobe and most of both lower lobes and the lingula. The infiltrates are much more extensive than on the previous examination. There is adequate opacification of the pulmonary arteries and there is no CT evidence of pulmonary embolus. Aorta is normal in size. It is not opacified. Upper abdominal images are unremarkable. Bones are normal.     No CT evidence of pulmonary embolus Bilateral large groundglass infiltrates suggesting Covid 19 pneumonia. Infiltrates are more extensive than on the old study from 11/30/2021 Signer Name: Vance Leonard MD  Signed: 12/4/2021 10:17 PM  Workstation Name: [email or web address]-  [facility]    CT Angiogram Chest    Result Date: 11/30/2021  CTA Chest INDICATION: Elevated d-dimer and low oxygen saturation. Fever and chills for one week. Cough TECHNIQUE: CT angiogram of the chest with IV contrast. 3-D reconstructions were obtained and reviewed.   Radiation dose reduction techniques included automated exposure control or exposure modulation based on body size. Count of known CT and cardiac nuc med studies performed in previous 12 months: 0. COMPARISON: None  available. FINDINGS: There are several patchy groundglass infiltrates throughout the lungs suggestive of Covid 19 pneumonia. Airways are patent. Thyroid gland is normal. Aorta is normal in appearance. There is optimal opacification of the pulmonary arteries. There is no CT evidence of pulmonary embolus. Upper abdominal images are unremarkable. Bones are unremarkable.     Patchy bilateral groundglass infiltrates typical of Covid 19 pneumonia No CT evidence of pulmonary embolus Signer Name: Vance Leonard MD  Signed: 11/30/2021 8:31 PM  Workstation Name: [email or web address]  [facility] [place]            Active Hospital Problems    Diagnosis  POA   • Pneumonia due to COVID-19 virus [U07.1, J12.82]  Yes      Resolved Hospital Problems   No resolved problems to display.         Assessment/Plan     1. SARS-CoV-2 infection with COVID-19 syndrome and pneumonia patient is on Decadron 8 mg every 12 hours he has refused remdesivir and declined baricitinib.  Thankfully it looks like he is improving slightly  2. Pneumonia secondary to COVID-19 syndrome he really has not developed any dense infiltrates his white counts been pretty normal and his procalcitonin is normal I do not think at this time he has any secondary bacterial infections  3. Acute hypoxic respiratory failure secondary to above supplemental O2 will wean as tolerated.    4. DVT right calf on full dose Lovenox, no evidence of PE.  He may be able to transition to Eliquis in the near future from my standpoint at any time..  5. Elevated transaminases he has a fairly significant elevation we see this not uncommonly with the Covid 19 syndrome it is a viral mediated hepatitis.  I would just continue to monitor his liver enzymes and they should continue to improve if they do not then will need further work-up.  6. Obstructive sleep apnea Pap intolerant I suspect some of his declining sats contributing overnight are related to his untreated sleep  apnea.  7. Gastroesophageal reflux disease with history of multiple ulcers with him on high-dose steroids absolutely continue PPI  8. Coronary artery disease          Plan for disposition:    Bairon Blas Jr, MD  12/10/21  06:52 EST    Time:

## 2021-12-11 ENCOUNTER — READMISSION MANAGEMENT (OUTPATIENT)
Dept: CALL CENTER | Facility: HOSPITAL | Age: 71
End: 2021-12-11

## 2021-12-11 VITALS
OXYGEN SATURATION: 95 % | WEIGHT: 173 LBS | HEIGHT: 69 IN | DIASTOLIC BLOOD PRESSURE: 72 MMHG | HEART RATE: 70 BPM | SYSTOLIC BLOOD PRESSURE: 124 MMHG | TEMPERATURE: 97.7 F | RESPIRATION RATE: 18 BRPM | BODY MASS INDEX: 25.62 KG/M2

## 2021-12-11 PROBLEM — D89.831 CYTOKINE RELEASE SYNDROME, GRADE 1: Status: ACTIVE | Noted: 2021-12-11

## 2021-12-11 LAB
ALBUMIN SERPL-MCNC: 2.9 G/DL (ref 3.5–5.2)
ALBUMIN/GLOB SERPL: 1 G/DL
ALP SERPL-CCNC: 84 U/L (ref 39–117)
ALT SERPL W P-5'-P-CCNC: 538 U/L (ref 1–41)
ANION GAP SERPL CALCULATED.3IONS-SCNC: 12.6 MMOL/L (ref 5–15)
AST SERPL-CCNC: 110 U/L (ref 1–40)
BILIRUB SERPL-MCNC: 0.5 MG/DL (ref 0–1.2)
BUN SERPL-MCNC: 28 MG/DL (ref 8–23)
BUN/CREAT SERPL: 31.5 (ref 7–25)
CALCIUM SPEC-SCNC: 9 MG/DL (ref 8.6–10.5)
CHLORIDE SERPL-SCNC: 98 MMOL/L (ref 98–107)
CO2 SERPL-SCNC: 20.4 MMOL/L (ref 22–29)
CREAT SERPL-MCNC: 0.89 MG/DL (ref 0.76–1.27)
CRP SERPL-MCNC: 1.1 MG/DL (ref 0–0.5)
FERRITIN SERPL-MCNC: 481 NG/ML (ref 30–400)
GFR SERPL CREATININE-BSD FRML MDRD: 84 ML/MIN/1.73
GLOBULIN UR ELPH-MCNC: 3 GM/DL
GLUCOSE SERPL-MCNC: 166 MG/DL (ref 65–99)
POTASSIUM SERPL-SCNC: 4.7 MMOL/L (ref 3.5–5.2)
PROCALCITONIN SERPL-MCNC: 0.1 NG/ML (ref 0–0.25)
PROT SERPL-MCNC: 5.9 G/DL (ref 6–8.5)
SODIUM SERPL-SCNC: 131 MMOL/L (ref 136–145)

## 2021-12-11 PROCEDURE — 99238 HOSP IP/OBS DSCHRG MGMT 30/<: CPT | Performed by: HOSPITALIST

## 2021-12-11 PROCEDURE — 63710000001 DEXAMETHASONE PER 0.25 MG: Performed by: INTERNAL MEDICINE

## 2021-12-11 PROCEDURE — 80053 COMPREHEN METABOLIC PANEL: CPT | Performed by: HOSPITALIST

## 2021-12-11 PROCEDURE — 94799 UNLISTED PULMONARY SVC/PX: CPT

## 2021-12-11 PROCEDURE — 84145 PROCALCITONIN (PCT): CPT | Performed by: HOSPITALIST

## 2021-12-11 PROCEDURE — 86140 C-REACTIVE PROTEIN: CPT | Performed by: HOSPITALIST

## 2021-12-11 PROCEDURE — 82728 ASSAY OF FERRITIN: CPT | Performed by: HOSPITALIST

## 2021-12-11 RX ORDER — DEXAMETHASONE 6 MG/1
6 TABLET ORAL
Qty: 5 TABLET | Refills: 0 | Status: SHIPPED | OUTPATIENT
Start: 2021-12-11 | End: 2021-12-16

## 2021-12-11 RX ORDER — DEXAMETHASONE 4 MG/1
8 TABLET ORAL EVERY 12 HOURS SCHEDULED
Qty: 20 TABLET | Refills: 0 | Status: SHIPPED | OUTPATIENT
Start: 2021-12-11 | End: 2021-12-11 | Stop reason: HOSPADM

## 2021-12-11 RX ADMIN — OXYCODONE HYDROCHLORIDE AND ACETAMINOPHEN 1000 MG: 500 TABLET ORAL at 08:49

## 2021-12-11 RX ADMIN — Medication 400 MG: at 08:49

## 2021-12-11 RX ADMIN — DEXAMETHASONE 8 MG: 4 TABLET ORAL at 08:49

## 2021-12-11 RX ADMIN — Medication 324 MG: at 08:49

## 2021-12-11 RX ADMIN — SODIUM CHLORIDE, PRESERVATIVE FREE 10 ML: 5 INJECTION INTRAVENOUS at 08:50

## 2021-12-11 RX ADMIN — Medication 1 CAPSULE: at 08:49

## 2021-12-11 RX ADMIN — PANTOPRAZOLE SODIUM 40 MG: 40 TABLET, DELAYED RELEASE ORAL at 08:49

## 2021-12-11 RX ADMIN — MULTIPLE VITAMINS W/ MINERALS TAB 1 TABLET: TAB at 08:49

## 2021-12-11 NOTE — PROCEDURES
Exercise Oximetry    Patient Name:Perez Bolaños   MRN: [id number]   Date: 12/11/21             ROOM AIR BASELINE   SpO2% 91   Heart Rate 85   Blood Pressure 115/75     EXERCISE ON ROOM AIR SpO2% EXERCISE ON O2 @  LPM SpO2%   1 MINUTE 91  1 MINUTE    2 MINUTES 90  2 MINUTES    3 MINUTES 90  3 MINUTES    4 MINUTES 89  4 MINUTES    5 MINUTES 90  5 MINUTES    6 MINUTES 90  6 MINUTES               Distance Walked  150 feet Distance Walked   Dyspnea (Faustina Scale)  1 Dyspnea (Faustina Scale)   Fatigue (Faustina Scale)  3 Fatigue (Faustina Scale)   SpO2% Post Exercise  93 SpO2% Post Exercise   HR Post Exercise  87 HR Post Exercise   Time to Recovery  30 sec Time to Recovery     Comments: Pt changed to head probe..test changed dramatically..cont with probe..pt denies soa..PPE utilized

## 2021-12-11 NOTE — PLAN OF CARE
Goal Outcome Evaluation:           Progress: improving  Outcome Summary: VSS, patient ambullating independently, continue po decadron and po vitamin C, no c/o pain, tolerating diet.

## 2021-12-11 NOTE — OUTREACH NOTE
Prep Survey      Responses   [facility] patient discharged from? [place]   Is LACE score < 7 ? No   Emergency Room discharge w/ pulse ox? No   Eligibility [facility]   Date of Admission 12/04/21   Date of Discharge 12/11/21   Discharge Disposition Home or Self Care   Discharge diagnosis COVID-19   Does the patient have one of the following disease processes/diagnoses(primary or secondary)? COVID-19   Does the patient have Home health ordered? No   Is there a DME ordered? No   Prep survey completed? Yes          Angeles Ryan RN

## 2021-12-11 NOTE — PROCEDURES
Exercise Oximetry    Patient Name:Perez Bolaños   MRN: [id number]   Date: 12/11/21             ROOM AIR BASELINE   SpO2%  91   Heart Rate  85   Blood Pressure 115/75     EXERCISE ON ROOM AIR SpO2% EXERCISE ON O2 @  LPM SpO2%   1 MINUTE 91 1 MINUTE    2 MINUTES 90 2 MINUTES    3 MINUTES 90 3 MINUTES    4 MINUTES 89 4 MINUTES    5 MINUTES 90 5 MINUTES    6 MINUTES 90 6 MINUTES               Distance Walked  150 feet Distance Walked   Dyspnea (Faustina Scale)  1 Dyspnea (Faustina Scale)   Fatigue (Faustina Scale)  3 Fatigue (Faustina Scale)   SpO2% Post Exercise  93 SpO2% Post Exercise   HR Post Exercise  87 HR Post Exercise   Time to Recovery  30 sec Time to Recovery     Comments: Pt changed to head probe..test results changed dramatically..continued with head probe.  Patient denies SOA..full PPE utilized

## 2021-12-11 NOTE — PROGRESS NOTES
[facility]  [phone]  Edward Santos MD    Subjective:  LOS: 7    Chief Complaint: COVID-19    Sitting in a chair.  Oxygen requirement decreasing daily.  Currently on 5 L nasal cannula.  Denies cough of phlegm.  No leg pains.  Denies nausea vomiting.  Eating well.  No pre-existing lung disease.  Never smoker.    Objective   Vital Signs past 24hrs    Temp range: Temp (24hrs), Av.9 °F (36.6 °C), Min:97.5 °F (36.4 °C), Max:98.4 °F (36.9 °C)    BP range: BP: (115-133)/(73-77) 115/75  Pulse range: Heart Rate:  [63-75] 75  Resp rate range: Resp:  [18] 18    Device (Oxygen Therapy): high-flow nasal cannulaFlow (L/min):  [5-9] 5  Oxygen range:SpO2:  [91 %-95 %] 91 %      78.5 kg (173 lb); Body mass index is 25.55 kg/m².    Intake/Output Summary (Last 24 hours) at 2021 0752  Last data filed at 12/10/2021 1800  Gross per 24 hour   Intake 960 ml   Output 575 ml   Net 385 ml       Physical Exam  Eyes:      Pupils: Pupils are equal, round, and reactive to light.   Cardiovascular:      Rate and Rhythm: Normal rate and regular rhythm.      Heart sounds: No murmur heard.      Pulmonary:      Effort: Pulmonary effort is normal.      Breath sounds: Rales (mild posterior) present.   Abdominal:      General: Bowel sounds are normal.      Palpations: Abdomen is soft. There is no mass.      Tenderness: There is no abdominal tenderness.   Musculoskeletal:         General: No swelling.   Neurological:      Mental Status: He is alert.       Results Review:    I have reviewed the laboratory and imaging data since the last note by LPC physician.  My annotations are noted in assessment and plan.    Results from last 7 days   Lab Units 21  0652 12/10/21  0540 21  0417 21  0410 21  0410 21  0352 21  0352 21  0417 21  0417 21  0528 12/04/21  2034 12/04/21  2034   PROCALCITONIN ng/mL  --   --   --   --  0.16  --  0.16  --   --  0.15   < > 0.14   FERRITIN ng/mL 481.00*  487.00* 493.00*   < > 567.00*   < > 616.00*   < > 507.00*  --    < > 748.00*   D DIMER QUANT MCGFEU/mL  --   --   --   --   --   --   --   --  >20.00*  --   --  4.13*   CRP mg/dL  --  2.20* 2.05*  --  4.60*   < > 7.36*   < > 5.04*  --   --   --     < > = values in this interval not displayed.       Medication Review:  I have reviewed the current MAR.  My annotations are noted in assessment and plan.       Plan   PCCM Problems  SARS-CoV-2 infection  Acute hypoxic respiratory failure  Bilateral pulmonary infiltrates  Right calf DVT  Elevated liver enzymes  Obstructive sleep apnea, intolerant CPAP  GERD  CAD  Acute hyponatremia    THESE ARE NEW MEDICAL PROBLEMS TO ME.    Plan of Treatment    Continue Decadron.  Currently at 8 mg twice daily.  Consider reducing dose as CRP improves.  Refused remdesivir and baricitinib.    Oxygenation improving and now on low-flow oxygen.    Bilateral pulmonary infiltrates without evidence of bacterial infection.    On full dose Lovenox for right calf DVT.  Can switch to NOAC.  Will need at least 3 months treatment.  No history of underlying hypercoagulable disorder.    Liver enzymes remain elevated.  Unclear if all due to COVID-19.  Continue to monitor.    Acute hyponatremia per primary team.    Intolerant CPAP.  Recommend reevaluation outpatient.    Edward Santos MD  12/11/21  07:52 EST    While in the room and during my examination of the patient I wore gloves, gown, mask, eye protection and followed enhanced droplet/contact isolation protocol and precautions.  I washed my hands before and after this patient encounter.    Part of this note may be an electronic transcription/translation of spoken language to printed text using the Dragon Dictation System.

## 2021-12-12 ENCOUNTER — TRANSITIONAL CARE MANAGEMENT TELEPHONE ENCOUNTER (OUTPATIENT)
Dept: CALL CENTER | Facility: HOSPITAL | Age: 71
End: 2021-12-12

## 2021-12-12 NOTE — OUTREACH NOTE
Call Center TCM Note      Responses   [facility] patient discharged from? [place]   Does the patient have one of the following disease processes/diagnoses(primary or secondary)? COVID-19   COVID-19 underlying condition? None   TCM attempt successful? Yes   Call start time 1350   Call end time 1359   Discharge diagnosis COVID-19   Person spoke with today (if not patient) and relationship Wanda and patient.   Meds reviewed with patient/caregiver? Yes   Is the patient having any side effects they believe may be caused by any medication additions or changes? No   Does the patient have all medications ordered at discharge? Yes   Is the patient taking all medications as directed (includes completed medication regime)? Yes   Medication comments Pt is taking correct dose of Eliquis, however second script will need PA and wife will discuss with PCP    Does the patient have a primary care provider?  Yes   Comments regarding PCP HOSP DC FU appt ( COVID) 12/15/21 @ 1pm.    Does the patient have an appointment with their PCP or specialist within 7 days of discharge? Yes   Has the patient kept scheduled appointments due by today? N/A   Psychosocial issues? No   Did the patient receive a copy of their discharge instructions? Yes   Did the patient receive a copy of COVID-19 specific instructions? Yes   Nursing interventions Reviewed instructions with patient   What is the patient's perception of their health status since discharge? Improving   Does the patient have any of the following symptoms? None   Nursing Interventions Nurse provided patient education   Pulse Ox monitoring Intermittent   Pulse Ox device source Patient   O2 Sat comments 91-92 % RA   O2 Sat: education provided Sat levels,  Monitoring frequency,  When to seek care   O2 Sat education comments Advised to return to ER if O2 sats remain below 90%    Is the patient/caregiver able to teach back steps to recovery at home? Rest and rebuild strength, gradually  increase activity,  Eat a well-balance diet   If the patient is a current smoker, are they able to teach back resources for cessation? Not a smoker   Is the patient/caregiver able to teach back the hierarchy of who to call/visit for symptoms/problems? PCP, Specialist, Home health nurse, Urgent Care, ED, 911 Yes   TCM call completed? Yes   Wrap up additional comments Slowly improving. wife and Pt states voice still hoarse and will discuss with PCP if not improving.           Aura Lazcano RN    12/12/2021, 14:01 EST

## 2021-12-13 ENCOUNTER — READMISSION MANAGEMENT (OUTPATIENT)
Dept: CALL CENTER | Facility: HOSPITAL | Age: 71
End: 2021-12-13

## 2021-12-13 NOTE — OUTREACH NOTE
COVID-19 Week 1 Survey      Responses   [facility] patient discharged from? [place]   Does the patient have one of the following disease processes/diagnoses(primary or secondary)? COVID-19   COVID-19 underlying condition? None   Call Number Call 4   Week 1 Call successful? Yes   Call start time 1108   General alerts for this patient .   Discharge diagnosis COVID-19          Julia Zapata RN

## 2021-12-15 ENCOUNTER — OFFICE VISIT (OUTPATIENT)
Dept: FAMILY MEDICINE CLINIC | Facility: CLINIC | Age: 71
End: 2021-12-15

## 2021-12-15 VITALS
HEART RATE: 81 BPM | BODY MASS INDEX: 24.73 KG/M2 | OXYGEN SATURATION: 95 % | DIASTOLIC BLOOD PRESSURE: 76 MMHG | SYSTOLIC BLOOD PRESSURE: 126 MMHG | HEIGHT: 69 IN | TEMPERATURE: 97.5 F | WEIGHT: 167 LBS

## 2021-12-15 DIAGNOSIS — U07.1 PNEUMONIA DUE TO COVID-19 VIRUS: ICD-10-CM

## 2021-12-15 DIAGNOSIS — U07.1 COVID-19 VIRUS INFECTION: Primary | ICD-10-CM

## 2021-12-15 DIAGNOSIS — J12.82 PNEUMONIA DUE TO COVID-19 VIRUS: ICD-10-CM

## 2021-12-15 DIAGNOSIS — B37.0 THRUSH, ORAL: ICD-10-CM

## 2021-12-15 DIAGNOSIS — Z09 HOSPITAL DISCHARGE FOLLOW-UP: ICD-10-CM

## 2021-12-15 DIAGNOSIS — I82.463 ACUTE DEEP VEIN THROMBOSIS (DVT) OF CALF MUSCLE VEIN OF BOTH LOWER EXTREMITIES (HCC): ICD-10-CM

## 2021-12-15 DIAGNOSIS — K71.2: ICD-10-CM

## 2021-12-15 PROCEDURE — 99214 OFFICE O/P EST MOD 30 MIN: CPT | Performed by: FAMILY MEDICINE

## 2021-12-15 NOTE — PROGRESS NOTES
"  Subjective   Perez Bolaños is a 71 y.o. male who is here for   Chief Complaint   Patient presents with   • Hospital Follow Up Visit     florin/covid   .     History of Present Illness     Bill is here in follow-up with his COVID-19 infection.  He was admitted to [facility] for several days.  Required high levels of oxygen.  Avoided being intubated.  He was unvaccinated COVID-19.  Did receive the Regeneron antibody injections.    Also suffered from liver damage from the COVID-19 infection.  Also has DVT in both of his calves from COVID-19.  Currently Eliquis.  His fever is gone.  He no longer requires oxygen.  His appetite is coming back  He has also lost significant amount of weight      The following portions of the patient's history were reviewed and updated as appropriate: allergies, current medications, past family history, past medical history, past social history, past surgical history and problem list.    Review of Systems    Objective   Vitals:    12/15/21 1254   BP: 126/76   BP Location: Left arm   Patient Position: Sitting   Cuff Size: Adult   Pulse: 81   Temp: 97.5 °F (36.4 °C)   TempSrc: Temporal   SpO2: 95%   Weight: 75.8 kg (167 lb)   Height: 175.3 cm (69\")      Physical Exam  Cardiovascular:      Rate and Rhythm: Normal rate.   Pulmonary:      Effort: Pulmonary effort is normal.   Neurological:      Mental Status: He is alert.   Psychiatric:         Mood and Affect: Mood normal.     He has oral thrush on palate and tongue    US Venous Doppler Lower Extremity Bilateral (duplex) (12/07/2021 11:21)    Current outpatient and discharge medications have been reconciled for the patient.  Reviewed by: Nav Carmona MD    Assessment/Plan   Diagnoses and all orders for this visit:    1. COVID-19 virus infection (Primary)    2. Pneumonia due to COVID-19 virus  -     XR Chest 2 View; Future    3. Hospital discharge follow-up    4. Acute deep vein thrombosis (DVT) of calf muscle vein of " both lower extremities (HCC)  -     apixaban (ELIQUIS) 5 MG tablet tablet; Take 1 tablet by mouth Every 12 (Twelve) Hours. Indications: DVT/PE (active thrombosis)  Dispense: 60 tablet; Refill: 1  -     US Venous Doppler Lower Extremity Bilateral (duplex); Future    5. Thrush, oral  -     nystatin (MYCOSTATIN) 100,000 unit/mL suspension; Swish and swallow 5 mL 4 (Four) Times a Day for 7 days.  Dispense: 473 mL; Refill: 1    6. Acute toxic hepatitis    Will order follow-up chest x-ray.  Will be on Eliquis for total of 3 months.  We will reDoppler ultrasound his legs for DVT follow-up in 3 months  Nystatin swish and spit for his thrush  Hold his statin until liver tests are normal.  When he returns in 3 months we will recheck liver function studies    There are no Patient Instructions on file for this visit.    Medications Discontinued During This Encounter   Medication Reason   • apixaban (ELIQUIS) 5 MG tablet tablet Reorder        Return in about 3 months (around 3/15/2022).    Dr. Nav Carmona  [facility]  [place], [place].

## 2021-12-16 ENCOUNTER — HOSPITAL ENCOUNTER (OUTPATIENT)
Dept: GENERAL RADIOLOGY | Facility: HOSPITAL | Age: 71
Discharge: HOME | End: 2021-12-16
Admitting: FAMILY MEDICINE

## 2021-12-16 ENCOUNTER — READMISSION MANAGEMENT (OUTPATIENT)
Dept: CALL CENTER | Facility: HOSPITAL | Age: 71
End: 2021-12-16

## 2021-12-16 DIAGNOSIS — U07.1 PNEUMONIA DUE TO COVID-19 VIRUS: ICD-10-CM

## 2021-12-16 DIAGNOSIS — J12.82 PNEUMONIA DUE TO COVID-19 VIRUS: ICD-10-CM

## 2021-12-16 PROCEDURE — 71046 X-RAY EXAM CHEST 2 VIEWS: CPT

## 2021-12-16 NOTE — OUTREACH NOTE
COVID-19 Week 2 Survey      Responses   [facility] patient discharged from? [place]   Does the patient have one of the following disease processes/diagnoses(primary or secondary)? COVID-19   COVID-19 underlying condition? None   Call Number Call 1   COVID-19 Week 2: Call 1 attempt successful? Yes   Call start time [phone]   Call end time [phone]   Discharge diagnosis COVID-19   Is patient permission given to speak with other caregiver? Yes   List who call center can speak with Wanda spouse    Person spoke with today (if not patient) and relationship Wanda spouse    Meds reviewed with patient/caregiver? Yes   Comments regarding PCP HOSP DC FU appt was yesterday 12/15/21   What is the patient's perception of their health status since discharge? Improving   Does the patient have any of the following symptoms? --  [going up stairs can make pt SOB]   Pulse Ox monitoring Intermittent   Pulse Ox device source Patient   O2 Sat comments 95-96% RA   O2 Sat: education provided Sat levels,  Monitoring frequency,  When to seek care   Is the patient/caregiver able to teach back steps to recovery at home? Set small, achievable goals for return to baseline health,  Rest and rebuild strength, gradually increase activity   Is the patient/caregiver able to teach back the hierarchy of who to call/visit for symptoms/problems? PCP, Specialist, Home health nurse, Urgent Care, ED, 911 Yes   COVID-19 call completed? Yes          Renetta Tohmpson RN

## 2021-12-17 ENCOUNTER — TELEPHONE (OUTPATIENT)
Dept: FAMILY MEDICINE CLINIC | Facility: CLINIC | Age: 71
End: 2021-12-17

## 2021-12-17 NOTE — TELEPHONE ENCOUNTER
Will be repeat a chest x-ray during his follow-up in March.  All the inflammatory fluid will take some time to resolve any Covid pneumonia

## 2021-12-17 NOTE — DISCHARGE SUMMARY
Perez Bolaños  1950  [id number]    Hospitalists Discharge Summary    Date of Admission: 12/4/2021  Date of Discharge:  12/11/2021    Primary Discharge Diagnoses:  1.  Acute Hypoxic Respiratory Failure due to COVID-19 Pneumonia  2.  Transaminitis likely due to COVID  3.  Right Calf DVT    Secondary Discharge Diagnoses:  1.  GERD    History of Present Illness (taken from H&P):  Patient is a 72 y/o male who initially presented to the ED on 11/30/2021 complaining of fever, chills, and bodyaches and was found to have COVID 19. His oxygen levels were okay at that time and he was discharged and received Regeneron infusion outpatient on 12/1/2021. Patient states that initially he felt better, but today he became very weak and developed shortness of breath that was worsening. His temperature was 99.2F. He denies any n/v/c/d. He does report taking some acetaminophen, but denies taking more than recommended dose.  He is unvaccinated against covid.     Hospital Course:  Mr. Bolaños was admitted to the Med/Surg unit in enhanced isolation.  He was continued on supplemental O2 and his inflammatory markers trended.  He was continued on steroid therapy.  His O2 needs escalated.  Dimer was up and CT chest was negative for PE, but a DVT was found in his right calf.  I did ask Pulmonary to consult on him for consideration of Baracitinib.  However, given his current DVT, he was deemed to not be a candidate.  Transminitis persisted and felt to be due to COVID.  However, I held his Clomid and Pravachol at discharge due to this.  Walking oximetry performed on the day of discharge did not show a need for supplemental O2 to be continued.      PCP  Patient Care Team:  Nav Carmona MD as PCP - General (Family Medicine)  Stacia, Harvinder Awan MD as Consulting Physician (Gastroenterology)  Tyler Jean MD as Consulting Physician (Orthopedic Surgery)    Consults:   Consults     No orders found from 11/5/2021 to 12/5/2021.           Operations and Procedures Performed:       Walking Oximetry    Result Date: 12/11/2021  Narrative: Clara Vega, RRT     12/11/2021 10:38 AM Exercise Oximetry Patient Name:Perez Bolaños MRN: [id number] Date: 12/11/21         ROOM AIR BASELINE SpO2%  91 Heart Rate  85 Blood Pressure 115/75 EXERCISE ON ROOM AIR SpO2% EXERCISE ON O2 @  LPM SpO2% 1 MINUTE 91 1 MINUTE  2 MINUTES 90 2 MINUTES  3 MINUTES 90 3 MINUTES  4 MINUTES 89 4 MINUTES  5 MINUTES 90 5 MINUTES  6 MINUTES 90 6 MINUTES           Distance Walked  150 feet Distance Walked Dyspnea (Faustina Scale)  1 Dyspnea (Faustina Scale) Fatigue (Faustina Scale)  3 Fatigue (Faustina Scale) SpO2% Post Exercise  93 SpO2% Post Exercise HR Post Exercise  87 HR Post Exercise Time to Recovery  30 sec Time to Recovery Comments: Pt changed to head probe..test results changed dramatically..continued with head probe.  Patient denies SOA..full PPE utilized    XR Chest 2 View    Result Date: 12/16/2021  Narrative: CHEST X-RAY, 12/16/2021     HISTORY: 71-year-old male with recent hospitalization for COVID-19 pneumonia. He notes difficulty breathing and weakness with activity.  TECHNIQUE: PA lateral upright chest x-ray.  COMPARISON: *  CTA chest, 12/07/2021. Chest x-ray, 11/30/2021.  FINDINGS: Moderately dense patchy multifocal airspace infiltrates scattered throughout both lungs, greatest within both midlungs. Infiltrates have increased in severity and extent since the original admission chest x-ray of 11/30/2021 with no subsequent chest radiography.  Heart size and pulmonary vascularity are normal. No visible pleural effusion. ORIF old healed left clavicle fracture.      Impression: Moderately dense diffuse multifocal airspace infiltrates.  This report was finalized on 12/16/2021 1:26 PM by Dr. Gary Pettit MD.      CT Angiogram Chest With & Without Contrast    Result Date: 12/7/2021  Narrative: CT ANGIOGRAM CHEST W WO CONTRAST INDICATION: Hypoxemia, elevated d-dimer TECHNIQUE:  CT angiogram of the chest with IV contrast. 3-D reconstructions were obtained and reviewed.   Radiation dose reduction techniques included automated exposure control or exposure modulation based on body size. Count of known CT and cardiac nuc med studies performed in previous 12 months: 2. COMPARISON: 12/4/2021 FINDINGS: There are stable large round dense infiltrates throughout the lungs the aorta is normal in size. There is optimal opacification of the pulmonary arteries and there is no CT evidence of pulmonary embolus. There is no adenopathy. Upper abdominal images are unremarkable. Bones are unremarkable. At     Impression: 1. Negative for pulmonary embolus. 2. No change in bilateral groundglass infiltrates Signer Name: Vance Leonard MD  Signed: 12/7/2021 2:07 AM  Workstation Name: [email or web address]  Radiology [facility]    XR Chest 1 View    Result Date: 11/30/2021  Narrative: PROCEDURE: CR Chest 1 Vw COMPARISON:  No relevant comparison or correlation studies available at time of dictation. INDICATIONS: Evaluate for pneumonia Relevant clinical info: Cough, SOA, fever/chills x 1 week. Pt is COVID19 positive. Has hx of Mi/heart cath TECHNIQUE: Single AP  view of the chest FINDINGS:  Cardiomediastinal silhouette is within normal limits given projection.  Lungs are relatively well inflated but, in the right mid lung in the region of the inferior aspect of the scapula there is slight increased opacity in the lungs that is questionable for developing infiltrate. A similar corresponding areas seen in the left lung. No consolidation and no hemidiaphragm elevation. Osseous structures are intact. Hardware is seen partially in the left clavicle     Impression: Questionable subtle bilateral midlung infiltrates versus artifact, single view. Consider follow-up 2 view chest x-ray.  Signer Name: Brielle Johnson MD  Signed: 11/30/2021 7:28 PM  Workstation Name: [email or web address]  Radiology [facility] Venous  Doppler Lower Extremity Bilateral (duplex)    Result Date: 12/7/2021  Narrative: VENOUS DOPPLER ULTRASOUND, BILATERAL LOWER EXTREMITIES, 12/07/2021  HISTORY: 71-year-old male hospital inpatient with Covid 19 pneumonia and elevated d-dimer.  TECHNIQUE: Venous Doppler ultrasound examination of both legs was performed using grey-scale, spectral Doppler, and color flow Doppler ultrasound imaging.  FINDINGS: Examination shows bilateral calf vein thrombus. *  On the right, there is occlusive DVT within the mid and upper posterior tibial vein and within the mid and upper soleal vein. *  On the left, thrombus is present within the small saphenous vein.  There is no deep venous thrombosis or superficial venous thrombosis at, or above the knee bilaterally. Greater saphenous veins are patent.      Impression: 1.  Right calf vein DVT and left calf SVT as noted above. 2.  No evidence of DVT or SVT above the calf level on the right or left.  This report was finalized on 12/7/2021 11:50 AM by Dr. Gary Pettit MD.      CT Angiogram Chest    Result Date: 12/4/2021  Narrative: CTA Chest INDICATION: Persistent cough, shortness of air, fever and chills for one half weeks. Covid positive patient TECHNIQUE: CT angiogram of the chest with IV contrast. 3-D reconstructions were obtained and reviewed.   Radiation dose reduction techniques included automated exposure control or exposure modulation based on body size. Count of known CT and cardiac nuc med studies performed in previous 12 months: 1. COMPARISON: 11/30/2021 FINDINGS: There are large areas of groundglass infiltrate throughout the lungs. This involves the posterior left upper lobe and most of both lower lobes and the lingula. The infiltrates are much more extensive than on the previous examination. There is adequate opacification of the pulmonary arteries and there is no CT evidence of pulmonary embolus. Aorta is normal in size. It is not opacified. Upper abdominal images are  unremarkable. Bones are normal.     Impression: No CT evidence of pulmonary embolus Bilateral large groundglass infiltrates suggesting Covid 19 pneumonia. Infiltrates are more extensive than on the old study from 11/30/2021 Signer Name: Vance Leonard MD  Signed: 12/4/2021 10:17 PM  Workstation Name: [facility]    CT Angiogram Chest    Result Date: 11/30/2021  Narrative: CTA Chest INDICATION: Elevated d-dimer and low oxygen saturation. Fever and chills for one week. Cough TECHNIQUE: CT angiogram of the chest with IV contrast. 3-D reconstructions were obtained and reviewed.   Radiation dose reduction techniques included automated exposure control or exposure modulation based on body size. Count of known CT and cardiac nuc med studies performed in previous 12 months: 0. COMPARISON: None available. FINDINGS: There are several patchy groundglass infiltrates throughout the lungs suggestive of Covid 19 pneumonia. Airways are patent. Thyroid gland is normal. Aorta is normal in appearance. There is optimal opacification of the pulmonary arteries. There is no CT evidence of pulmonary embolus. Upper abdominal images are unremarkable. Bones are unremarkable.     Impression: Patchy bilateral groundglass infiltrates typical of Covid 19 pneumonia No CT evidence of pulmonary embolus Signer Name: Vance Leonard MD  Signed: 11/30/2021 8:31 PM  Workstation Name: [facility]      Allergies:  is allergic to aspirin, nsaids, hydrocodone, and nitroglycerin.    Moris  not reviewed    Discharge Medications:     Discharge Medications      Continue These Medications      Instructions Start Date   CHLORPHENIRAMINE MALEATE PO   4 mg, Oral, As Needed      ferrous gluconate 324 MG tablet  Commonly known as: FERGON   324 mg, Oral, Daily With Breakfast      magnesium oxide 400 MG tablet  Commonly known as: MAG-OX   120 mg, Oral, Daily      multivitamin with minerals tablet  tablet   1 tablet, Oral, Daily      pantoprazole 40 MG EC tablet  Commonly known as: PROTONIX   TAKE ONE TABLET BY MOUTH DAILY      PROBIOTIC ADVANCED PO   1 tablet, Oral, Every Morning      sildenafil 20 MG tablet  Commonly known as: REVATIO   20 mg, Oral, Daily PRN         Stop These Medications    clomiPHENE 50 MG tablet  Commonly known as: CLOMID     dexamethasone 4 MG tablet  Commonly known as: DECADRON     pravastatin 20 MG tablet  Commonly known as: PRAVACHOL        ASK your doctor about these medications      Instructions Start Date   dexamethasone 6 MG tablet  Commonly known as: DECADRON  Ask about: Should I take this medication?   6 mg, Oral, Daily With Breakfast             Last Lab Results:   Lab Results (most recent)     Procedure Component Value Units Date/Time    C-reactive Protein [[id number]]  (Abnormal) Collected: 12/11/21 0652    Specimen: Blood Updated: 12/11/21 1254     C-Reactive Protein 1.10 mg/dL     Procalcitonin [[id number]]  (Normal) Collected: 12/11/21 0652    Specimen: Blood Updated: 12/11/21 0818     Procalcitonin 0.10 ng/mL     Narrative:      Results may be falsely decreased if patient taking Biotin.     Comprehensive Metabolic Panel [[id number]]  (Abnormal) Collected: 12/11/21 0652    Specimen: Blood Updated: 12/11/21 0751     Glucose 166 mg/dL      BUN 28 mg/dL      Creatinine 0.89 mg/dL      Sodium 131 mmol/L      Potassium 4.7 mmol/L      Chloride 98 mmol/L      CO2 20.4 mmol/L      Calcium 9.0 mg/dL      Total Protein 5.9 g/dL      Albumin 2.90 g/dL      ALT (SGPT) 538 U/L      AST (SGOT) 110 U/L      Alkaline Phosphatase 84 U/L      Total Bilirubin 0.5 mg/dL      eGFR Non African Amer 84 mL/min/1.73      Globulin 3.0 gm/dL      A/G Ratio 1.0 g/dL      BUN/Creatinine Ratio 31.5     Anion Gap 12.6 mmol/L     Narrative:      GFR Normal >60  Chronic Kidney Disease <60  Kidney Failure <15      Ferritin [[id number]]  (Abnormal) Collected: 12/11/21 0652    Specimen: Blood Updated: 12/11/21  0750     Ferritin 481.00 ng/mL     Narrative:      Results may be falsely decreased if patient taking Biotin.      C-reactive Protein [[id number]]  (Abnormal) Collected: 12/10/21 0540    Specimen: Blood Updated: 12/10/21 1135     C-Reactive Protein 2.20 mg/dL     Ferritin [[id number]]  (Abnormal) Collected: 12/10/21 0540    Specimen: Blood Updated: 12/10/21 0655     Ferritin 487.00 ng/mL     Narrative:      Results may be falsely decreased if patient taking Biotin.      Comprehensive Metabolic Panel [[id number]]  (Abnormal) Collected: 12/10/21 0540    Specimen: Blood Updated: 12/10/21 0655     Glucose 166 mg/dL      BUN 28 mg/dL      Creatinine 0.89 mg/dL      Sodium 134 mmol/L      Potassium 4.7 mmol/L      Chloride 101 mmol/L      CO2 19.2 mmol/L      Calcium 8.8 mg/dL      Total Protein 6.0 g/dL      Albumin 2.90 g/dL      ALT (SGPT) 532 U/L      AST (SGOT) 107 U/L      Alkaline Phosphatase 78 U/L      Total Bilirubin 0.5 mg/dL      eGFR Non African Amer 84 mL/min/1.73      Globulin 3.1 gm/dL      A/G Ratio 0.9 g/dL      BUN/Creatinine Ratio 31.5     Anion Gap 13.8 mmol/L     Narrative:      GFR Normal >60  Chronic Kidney Disease <60  Kidney Failure <15      Procalcitonin [[id number]]  (Normal) Collected: 12/08/21 0410    Specimen: Blood Updated: 12/08/21 0510     Procalcitonin 0.16 ng/mL     Narrative:      Results may be falsely decreased if patient taking Biotin.     CBC & Differential [[id number]]  (Abnormal) Collected: 12/08/21 0410    Specimen: Blood Updated: 12/08/21 0437    Narrative:      The following orders were created for panel order CBC & Differential.  Procedure                               Abnormality         Status                     ---------                               -----------         ------                     CBC Auto Differential[[id number]]        Abnormal            Final result                 Please view results for these tests on the individual orders.    CBC Auto Differential  [[id number]]  (Abnormal) Collected: 12/08/21 0410    Specimen: Blood Updated: 12/08/21 0437     WBC 11.61 10*3/mm3      RBC 4.83 10*6/mm3      Hemoglobin 13.5 g/dL      Hematocrit 41.6 %      MCV 86.1 fL      MCH 28.0 pg      MCHC 32.5 g/dL      RDW 15.9 %      RDW-SD 50.0 fl      MPV 10.0 fL      Platelets 275 10*3/mm3      Neutrophil % 92.1 %      Lymphocyte % 3.8 %      Monocyte % 2.9 %      Eosinophil % 0.1 %      Basophil % 0.2 %      Immature Grans % 0.9 %      Neutrophils, Absolute 10.69 10*3/mm3      Lymphocytes, Absolute 0.44 10*3/mm3      Monocytes, Absolute 0.34 10*3/mm3      Eosinophils, Absolute 0.01 10*3/mm3      Basophils, Absolute 0.02 10*3/mm3      Immature Grans, Absolute 0.11 10*3/mm3      nRBC 0.0 /100 WBC     D-dimer, Quantitative [[id number]]  (Abnormal) Collected: 12/06/21 0417    Specimen: Blood Updated: 12/06/21 0929     D-Dimer, Quantitative >20.00 MCGFEU/mL     Narrative:      Can be elevated in, but is not diagnostic for deep vein thrombosis (DVT) or pulmonary embolis (PE).  It is also elevated in other medical conditions.  Clinical correlation is required.  The negative cut-off value for the D-Dimer is 0.50 mcg FEU/mL for DVT and PE.      Hepatitis Panel, Acute [[id number]]  (Normal) Collected: 12/05/21 0528    Specimen: Blood Updated: 12/05/21 1306     Hepatitis B Surface Ag Non-Reactive     Hep A IgM Non-Reactive     Hep B C IgM Non-Reactive     Hepatitis C Ab Non-Reactive    Narrative:      Results may be falsely decreased if patient taking Biotin.     Hemoglobin A1c [[id number]]  (Abnormal) Collected: 12/05/21 0528    Specimen: Blood Updated: 12/05/21 0647     Hemoglobin A1C 6.60 %     Narrative:      Hemoglobin A1C Ranges:    Increased Risk for Diabetes  5.7% to 6.4%  Diabetes                     >= 6.5%  Diabetic Goal                < 7.0%    CBC Auto Differential [[id number]]  (Abnormal) Collected: 12/05/21 0528    Specimen: Blood Updated: 12/05/21 0631     WBC 8.50 10*3/mm3      RBC  4.40 10*6/mm3      Hemoglobin 12.4 g/dL      Hematocrit 38.1 %      MCV 86.6 fL      MCH 28.2 pg      MCHC 32.5 g/dL      RDW 16.5 %      RDW-SD 52.5 fl      MPV 9.9 fL      Platelets 268 10*3/mm3      Neutrophil % 90.9 %      Lymphocyte % 5.2 %      Monocyte % 3.4 %      Eosinophil % 0.0 %      Basophil % 0.0 %      Immature Grans % 0.5 %      Neutrophils, Absolute 7.73 10*3/mm3      Lymphocytes, Absolute 0.44 10*3/mm3      Monocytes, Absolute 0.29 10*3/mm3      Eosinophils, Absolute 0.00 10*3/mm3      Basophils, Absolute 0.00 10*3/mm3      Immature Grans, Absolute 0.04 10*3/mm3      nRBC 0.0 /100 WBC     D-dimer, Quantitative [[id number]]  (Abnormal) Collected: 12/04/21 2034    Specimen: Blood Updated: 12/04/21 2117     D-Dimer, Quantitative 4.13 MCGFEU/mL     Narrative:      Can be elevated in, but is not diagnostic for deep vein thrombosis (DVT) or pulmonary embolis (PE).  It is also elevated in other medical conditions.  Clinical correlation is required.  The negative cut-off value for the D-Dimer is 0.50 mcg FEU/mL for DVT and PE.      BNP [[id number]]  (Normal) Collected: 12/04/21 2034    Specimen: Blood Updated: 12/04/21 2105     proBNP 521.2 pg/mL     Narrative:      Among patients with dyspnea, NT-proBNP is highly sensitive for the detection of acute congestive heart failure. In addition NT-proBNP of <300 pg/ml effectively rules out acute congestive heart failure with 99% negative predictive value.    Results may be falsely decreased if patient taking Biotin.      Troponin [[id number]]  (Normal) Collected: 12/04/21 2034    Specimen: Blood Updated: 12/04/21 2102     Troponin T <0.010 ng/mL     Narrative:      Troponin T Reference Range:  <= 0.03 ng/mL-   Negative for AMI  >0.03 ng/mL-     Abnormal for myocardial necrosis.  Clinicians would have to utilize clinical acumen, EKG, Troponin and serial changes to determine if it is an Acute Myocardial Infarction or myocardial injury due to an underlying chronic  condition.       Results may be falsely decreased if patient taking Biotin.      CBC & Differential [[id number]]  (Abnormal) Collected: 12/04/21 2034    Specimen: Blood Updated: 12/04/21 2041    Narrative:      The following orders were created for panel order CBC & Differential.  Procedure                               Abnormality         Status                     ---------                               -----------         ------                     CBC Auto Differential[[id number]]        Abnormal            Final result                 Please view results for these tests on the individual orders.        Imaging Results (Most Recent)     Procedure Component Value Units Date/Time    US Venous Doppler Lower Extremity Bilateral (duplex) [[id number]] Collected: 12/07/21 1146     Updated: 12/07/21 1152    Narrative:      VENOUS DOPPLER ULTRASOUND, BILATERAL LOWER EXTREMITIES, 12/07/2021     HISTORY:   71-year-old male hospital inpatient with Covid 19 pneumonia and elevated  d-dimer.     TECHNIQUE:   Venous Doppler ultrasound examination of both legs was performed using  grey-scale, spectral Doppler, and color flow Doppler ultrasound imaging.     FINDINGS:   Examination shows bilateral calf vein thrombus.  *  On the right, there is occlusive DVT within the mid and upper  posterior tibial vein and within the mid and upper soleal vein.  *  On the left, thrombus is present within the small saphenous vein.     There is no deep venous thrombosis or superficial venous thrombosis at,  or above the knee bilaterally. Greater saphenous veins are patent.       Impression:      1.  Right calf vein DVT and left calf SVT as noted above.  2.  No evidence of DVT or SVT above the calf level on the right or left.     This report was finalized on 12/7/2021 11:50 AM by Dr. Gary Pettit MD.       CT Angiogram Chest With & Without Contrast [[id number]] Collected: 12/07/21 0207     Updated: 12/07/21 0209    Narrative:      CT ANGIOGRAM  CHEST W WO CONTRAST    INDICATION:   Hypoxemia, elevated d-dimer    TECHNIQUE:   CT angiogram of the chest with IV contrast. 3-D reconstructions were obtained and reviewed.   Radiation dose reduction techniques included automated exposure control or exposure modulation based on body size. Count of known CT and cardiac nuc med studies  performed in previous 12 months: 2.     COMPARISON:   12/4/2021    FINDINGS:   There are stable large round dense infiltrates throughout the lungs the aorta is normal in size. There is optimal opacification of the pulmonary arteries and there is no CT evidence of pulmonary embolus. There is no adenopathy. Upper abdominal images are  unremarkable. Bones are unremarkable.    At      Impression:      1. Negative for pulmonary embolus.    2. No change in bilateral groundglass infiltrates    Signer Name: Vance Leonard MD   Signed: 12/7/2021 2:07 AM   Workstation Name: [email or web address]    [facility]    CT Angiogram Chest [[id number]] Collected: 12/04/21 2217     Updated: 12/04/21 2219    Narrative:      CTA Chest    INDICATION:   Persistent cough, shortness of air, fever and chills for one half weeks. Covid positive patient    TECHNIQUE:   CT angiogram of the chest with IV contrast. 3-D reconstructions were obtained and reviewed.   Radiation dose reduction techniques included automated exposure control or exposure modulation based on body size. Count of known CT and cardiac nuc med studies  performed in previous 12 months: 1.     COMPARISON:   11/30/2021    FINDINGS:   There are large areas of groundglass infiltrate throughout the lungs. This involves the posterior left upper lobe and most of both lower lobes and the lingula. The infiltrates are much more extensive than on the previous examination. There is adequate  opacification of the pulmonary arteries and there is no CT evidence of pulmonary embolus. Aorta is normal in size. It is not opacified. Upper abdominal images  are unremarkable. Bones are normal.          Impression:      No CT evidence of pulmonary embolus    Bilateral large groundglass infiltrates suggesting Covid 19 pneumonia. Infiltrates are more extensive than on the old study from 11/30/2021    Signer Name: Vance Leonard MD   Signed: 12/4/2021 10:17 PM   [facility]station [facility]: [email or web address]    [facility]          PROCEDURES      Condition on Discharge:  Stable    Physical Exam at Discharge  Vital Signs  Vitals:    12/11/21 1134   BP: 124/72   Pulse: 70   Resp: 18   Temp: 97.7 °F (36.5 °C)   SpO2: 95%       Physical Exam:  Physical Exam   Constitutional: Patient appears well-developed and well-nourished and in no acute distress   Pulmonary/Chest: No respiratory distress.   Neurological: Cranial nerves II-XII are grossly intact with no focal deficits.  Psych: Pleasant affect.    Discharge Disposition  Home    Visiting Nurse:    No     Home PT/OT:  No     Home Safety Evaluation:  No     DME  None    Discharge Diet:    Regular    Activity at Discharge:  As tolerated    Follow-up Appointments  Future Appointments   Date Time Provider Department Center   3/17/2022 11:00 AM Nav Carmona MD [facility]   3/28/2022  9:30 AM [facility]   4/1/2022 11:00 AM Nav Carmona MD [facility]     Additional Instructions for the Follow-ups that You Need to Schedule     Discharge Follow-up with PCP   As directed       Currently Documented PCP:    Nav Carmona MD    PCP Phone Number:    [phone]     Follow Up Details: 1 week.  Needs repeat LFT's               Test Results Pending at Discharge  None     Gt Rogel MD  12/17/21  10:24 EST     <30 minutes

## 2021-12-17 NOTE — TELEPHONE ENCOUNTER
PATIENT'S WIFE FERNIE CALLED IN REGARDS TO CHEST X RAYS DONE YESTERDAY. SHE IS CONCERNED ABOUT THE FINDINGS IN MY CHART    PLEASE CALL [phone]

## 2021-12-23 ENCOUNTER — READMISSION MANAGEMENT (OUTPATIENT)
Dept: CALL CENTER | Facility: HOSPITAL | Age: 71
End: 2021-12-23

## 2021-12-23 NOTE — OUTREACH NOTE
COVID-19 Week 3 Survey      Responses   [facility] patient discharged from? [place]   Does the patient have one of the following disease processes/diagnoses(primary or secondary)? COVID-19   COVID-19 underlying condition? None   Call Number Call 1   COVID-19 Week 3: Call 1 attempt successful? Yes   Call start time [phone]   Call end time [phone]   Discharge diagnosis COVID-19   Person spoke with today (if not patient) and relationship Wanda spouse and patient   Meds reviewed with patient/caregiver? Yes   Is the patient having any side effects they believe may be caused by any medication additions or changes? No   Does the patient have all medications ordered at discharge? Yes   Is the patient taking all medications as directed (includes completed medication regime)? Yes   Has the patient kept scheduled appointments due by today? Yes   Nursing interventions Reviewed instructions with patient   What is the patient's perception of their health status since discharge? Improving   Does the patient have any of the following symptoms? Shortness of breath  [SOB with activity]   Pulse Ox monitoring Intermittent   COVID-19 call completed? Yes          Cinthya Casarez RN

## 2021-12-30 ENCOUNTER — READMISSION MANAGEMENT (OUTPATIENT)
Dept: CALL CENTER | Facility: HOSPITAL | Age: 71
End: 2021-12-30

## 2021-12-30 NOTE — OUTREACH NOTE
COVID-19 Week 4 Survey      Responses   [facility] patient discharged from? [place]   Does the patient have one of the following disease processes/diagnoses(primary or secondary)? COVID-19   COVID-19 underlying condition? AMI   Call Number Call 1   COVID-19 Week 4: Call 1 attempt successful? No   Discharge diagnosis COVID-19          Rinku Victoria RN  
WDL

## 2022-01-04 ENCOUNTER — HOSPITAL ENCOUNTER (OUTPATIENT)
Dept: GENERAL RADIOLOGY | Facility: HOSPITAL | Age: 72
Discharge: HOME | End: 2022-01-04
Admitting: FAMILY MEDICINE

## 2022-01-04 ENCOUNTER — OFFICE VISIT (OUTPATIENT)
Dept: FAMILY MEDICINE CLINIC | Facility: CLINIC | Age: 72
End: 2022-01-04

## 2022-01-04 VITALS
OXYGEN SATURATION: 97 % | SYSTOLIC BLOOD PRESSURE: 138 MMHG | BODY MASS INDEX: 26.36 KG/M2 | DIASTOLIC BLOOD PRESSURE: 74 MMHG | TEMPERATURE: 98 F | WEIGHT: 178 LBS | HEIGHT: 69 IN | HEART RATE: 96 BPM

## 2022-01-04 DIAGNOSIS — D89.831 CYTOKINE RELEASE SYNDROME, GRADE 1: ICD-10-CM

## 2022-01-04 DIAGNOSIS — J12.82 PNEUMONIA DUE TO COVID-19 VIRUS: ICD-10-CM

## 2022-01-04 DIAGNOSIS — R79.89 ELEVATED LFTS: ICD-10-CM

## 2022-01-04 DIAGNOSIS — R51.9 NEW ONSET OF HEADACHES AFTER AGE 50: Primary | ICD-10-CM

## 2022-01-04 DIAGNOSIS — U07.1 PNEUMONIA DUE TO COVID-19 VIRUS: ICD-10-CM

## 2022-01-04 PROCEDURE — 71046 X-RAY EXAM CHEST 2 VIEWS: CPT

## 2022-01-04 PROCEDURE — 99214 OFFICE O/P EST MOD 30 MIN: CPT | Performed by: FAMILY MEDICINE

## 2022-01-04 NOTE — PROGRESS NOTES
"  Subjective   Perez Bolaños is a 71 y.o. male who is here for   Chief Complaint   Patient presents with   • Headache     only when wakes up/ wakes him up during sleep    • Neck Pain     2 weeks- back of head    .     History of Present Illness   Comes in with new onset posterior headaches.  Ongoing for 2 to 3 weeks.  Since his hospital stay for COVID-19.  Begins in the posterior neck radiates and into the occipital area.  No nausea or vomiting.  No change in vision.  No other neurological symptoms.  No facial droop or slurred speech.  He did suffer DVTs in both legs when his COVID-19 infection.  So there is some concern for a sinus thrombosis        The following portions of the patient's history were reviewed and updated as appropriate: allergies, current medications, past family history, past medical history, past social history, past surgical history and problem list.    Review of Systems    Objective   Vitals:    01/04/22 1405   BP: 138/74   BP Location: Left arm   Patient Position: Sitting   Cuff Size: Adult   Pulse: 96   Temp: 98 °F (36.7 °C)   TempSrc: Temporal   SpO2: 97%   Weight: 80.7 kg (178 lb)   Height: 175.3 cm (69\")      Physical Exam  Neck:     Cardiovascular:      Rate and Rhythm: Normal rate.   Pulmonary:      Effort: Pulmonary effort is normal.   Musculoskeletal:      Cervical back: Muscular tenderness present.   Neurological:      General: No focal deficit present.      Mental Status: He is alert.         Assessment/Plan   Diagnoses and all orders for this visit:    1. New onset of headaches after age 50 (Primary)  -     CT angiogram Head; Future  -     Renal Function Panel    2. Cytokine release syndrome, grade 1  -     Renal Function Panel    3. Elevated LFTs  -     Renal Function Panel  -     Hepatic Function Panel    4. Pneumonia due to COVID-19 virus  -     XR Chest 2 View; Future  -     Renal Function Panel    CTA of brain to rule out intracranial clot  We will check his renal function " for the IV contrast    While we are drawing blood we will repeat his LFTs which are elevated due to his COVID-19.    Also well repeat chest x-ray to see if his Covid pneumonia is clearing      There are no Patient Instructions on file for this visit.    There are no discontinued medications.     Return for Next scheduled follow up.    Dr. Nav Carmona  [place], [place]

## 2022-01-05 LAB
ALBUMIN SERPL-MCNC: 4 G/DL (ref 3.7–4.7)
ALP SERPL-CCNC: 73 IU/L (ref 44–121)
ALT SERPL-CCNC: 42 IU/L (ref 0–44)
AST SERPL-CCNC: 24 IU/L (ref 0–40)
BILIRUB DIRECT SERPL-MCNC: <0.1 MG/DL (ref 0–0.4)
BILIRUB SERPL-MCNC: <0.2 MG/DL (ref 0–1.2)
BUN SERPL-MCNC: 11 MG/DL (ref 8–27)
BUN/CREAT SERPL: 10 (ref 10–24)
CALCIUM SERPL-MCNC: 9.3 MG/DL (ref 8.6–10.2)
CHLORIDE SERPL-SCNC: 102 MMOL/L (ref 96–106)
CO2 SERPL-SCNC: 22 MMOL/L (ref 20–29)
CREAT SERPL-MCNC: 1.09 MG/DL (ref 0.76–1.27)
GLUCOSE SERPL-MCNC: 148 MG/DL (ref 65–99)
PHOSPHATE SERPL-MCNC: 3.6 MG/DL (ref 2.8–4.1)
POTASSIUM SERPL-SCNC: 4.3 MMOL/L (ref 3.5–5.2)
PROT SERPL-MCNC: 6.5 G/DL (ref 6–8.5)
SODIUM SERPL-SCNC: 138 MMOL/L (ref 134–144)

## 2022-01-13 ENCOUNTER — HOSPITAL ENCOUNTER (OUTPATIENT)
Dept: CT IMAGING | Facility: HOSPITAL | Age: 72
Discharge: HOME | End: 2022-01-13
Admitting: FAMILY MEDICINE

## 2022-01-13 DIAGNOSIS — R51.9 NEW ONSET OF HEADACHES AFTER AGE 50: ICD-10-CM

## 2022-01-13 PROCEDURE — 0 IOPAMIDOL PER 1 ML: Performed by: FAMILY MEDICINE

## 2022-01-13 PROCEDURE — 70496 CT ANGIOGRAPHY HEAD: CPT

## 2022-01-13 RX ADMIN — IOPAMIDOL 100 ML: 755 INJECTION, SOLUTION INTRAVENOUS at 14:47

## 2022-03-07 ENCOUNTER — HOSPITAL ENCOUNTER (OUTPATIENT)
Dept: ULTRASOUND IMAGING | Facility: HOSPITAL | Age: 72
Discharge: HOME | End: 2022-03-07
Admitting: FAMILY MEDICINE

## 2022-03-07 DIAGNOSIS — I82.463 ACUTE DEEP VEIN THROMBOSIS (DVT) OF CALF MUSCLE VEIN OF BOTH LOWER EXTREMITIES: ICD-10-CM

## 2022-03-07 PROCEDURE — 93970 EXTREMITY STUDY: CPT

## 2022-03-08 ENCOUNTER — TELEPHONE (OUTPATIENT)
Dept: FAMILY MEDICINE CLINIC | Facility: CLINIC | Age: 72
End: 2022-03-08

## 2022-03-23 ENCOUNTER — TELEPHONE (OUTPATIENT)
Dept: FAMILY MEDICINE CLINIC | Facility: CLINIC | Age: 72
End: 2022-03-23

## 2022-03-23 DIAGNOSIS — I82.463 ACUTE DEEP VEIN THROMBOSIS (DVT) OF CALF MUSCLE VEIN OF BOTH LOWER EXTREMITIES: ICD-10-CM

## 2022-03-23 NOTE — TELEPHONE ENCOUNTER
finish up this last few pills of Eliquis then stop.  Your Doppler ultrasound a few weeks ago the DVTs are all gone.  No need to continue on with anticoagulation.

## 2022-03-23 NOTE — TELEPHONE ENCOUNTER
Caller: Wanda Bolaños    Relationship: Emergency Contact    Best call back number: [phone]    What was the call regarding: PATIENT HAS SIX DAYS LEFT OF HIS ELIQUIS, PLEASE ADVISE IF PATIENT STILL NEEDS TO BE TAKING THIS MEDICATION OR IF HE SHOULD GET A REFILL BEFORE HIS APPT ON 4/1 WITH DR MARLEY.     Do you require a callback: YES

## 2022-04-01 ENCOUNTER — OFFICE VISIT (OUTPATIENT)
Dept: FAMILY MEDICINE CLINIC | Facility: CLINIC | Age: 72
End: 2022-04-01

## 2022-04-01 VITALS
OXYGEN SATURATION: 100 % | SYSTOLIC BLOOD PRESSURE: 132 MMHG | WEIGHT: 182 LBS | DIASTOLIC BLOOD PRESSURE: 80 MMHG | BODY MASS INDEX: 26.96 KG/M2 | HEART RATE: 64 BPM | HEIGHT: 69 IN | TEMPERATURE: 97.3 F

## 2022-04-01 DIAGNOSIS — Z87.11 HISTORY OF GASTRIC ULCER: ICD-10-CM

## 2022-04-01 DIAGNOSIS — I82.463 ACUTE DEEP VEIN THROMBOSIS (DVT) OF CALF MUSCLE VEIN OF BOTH LOWER EXTREMITIES: Primary | ICD-10-CM

## 2022-04-01 PROBLEM — B37.0 THRUSH, ORAL: Status: RESOLVED | Noted: 2021-12-15 | Resolved: 2022-04-01

## 2022-04-01 PROBLEM — R51.9 NEW ONSET OF HEADACHES AFTER AGE 50: Status: RESOLVED | Noted: 2022-01-04 | Resolved: 2022-04-01

## 2022-04-01 PROBLEM — Z00.00 ROUTINE ADULT HEALTH MAINTENANCE: Status: RESOLVED | Noted: 2018-03-26 | Resolved: 2022-04-01

## 2022-04-01 PROBLEM — K71.2 ACUTE TOXIC HEPATITIS: Status: RESOLVED | Noted: 2021-12-15 | Resolved: 2022-04-01

## 2022-04-01 PROCEDURE — 99213 OFFICE O/P EST LOW 20 MIN: CPT | Performed by: FAMILY MEDICINE

## 2022-04-01 RX ORDER — ACETAMINOPHEN 500 MG
500 TABLET ORAL EVERY 6 HOURS PRN
COMMUNITY

## 2022-04-01 NOTE — PROGRESS NOTES
"  Subjective   Perez Bolaños is a 71 y.o. male who is here for   Chief Complaint   Patient presents with   • lab results      Follow up   • Anemia   .     History of Present Illness   Mr. Bolaños comes back in follow-up.  He had a significant COVID-19 infection with bilateral pneumonias and peripheral DVTs.  We repeated his leg Doppler and the leg DVT is all gone.  Therefore we can stop his Eliquis.    He has significant elevated liver function studies due to COVID-19 infection.  They to also come back to normal.    His appetite and energy have returned.    Last visit here he was having new onset headaches.  Due to recent history of COVID-19 we performed a CTA of head which was all normal.  His headaches are now gone    Reviewed his iron deficiency anemia due to upper GI bleed.  His hemoglobin is back to normal.  And we will stop his iron        The following portions of the patient's history were reviewed and updated as appropriate: allergies, current medications, past family history, past medical history, past social history, past surgical history and problem list.    Review of Systems    Objective   Vitals:    04/01/22 1032   BP: 132/80   BP Location: Left arm   Patient Position: Sitting   Cuff Size: Adult   Pulse: 64   Temp: 97.3 °F (36.3 °C)   SpO2: 100%   Weight: 82.6 kg (182 lb)   Height: 175.3 cm (69\")      Physical Exam  Vitals reviewed.   Cardiovascular:      Rate and Rhythm: Normal rate.   Pulmonary:      Effort: Pulmonary effort is normal.   Neurological:      Mental Status: He is alert.       US Venous Doppler Lower Extremity Bilateral (duplex) (03/07/2022 14:19)  CT Angiogram Head (01/13/2022 17:24)    Assessment/Plan   Diagnoses and all orders for this visit:    1. Acute deep vein thrombosis (DVT) of calf muscle vein of both lower extremities (HCC) (Primary)    2. History of gastric ulcer      There are no Patient Instructions on file for this visit.    Medications Discontinued During This Encounter " Spoke with Linette in the transfer center at [facility], [phone], who stated they are still on surge and have no beds available. Linette requested update on patient condition, gave updated labs and medications as requested.     Linette states she will call when   Medication Reason   • apixaban (ELIQUIS) 5 MG tablet tablet *Therapy completed   • ferrous gluconate (FERGON) 324 MG tablet *Therapy completed        No follow-ups on file.    Dr. Nav Carmona  [place], [place]

## 2022-08-17 ENCOUNTER — OFFICE VISIT (OUTPATIENT)
Dept: FAMILY MEDICINE CLINIC | Facility: CLINIC | Age: 72
End: 2022-08-17

## 2022-08-17 VITALS
BODY MASS INDEX: 26.96 KG/M2 | TEMPERATURE: 98.2 F | SYSTOLIC BLOOD PRESSURE: 148 MMHG | DIASTOLIC BLOOD PRESSURE: 80 MMHG | OXYGEN SATURATION: 95 % | HEART RATE: 75 BPM | HEIGHT: 69 IN | WEIGHT: 182 LBS

## 2022-08-17 DIAGNOSIS — M65.30 TRIGGER FINGER OF LEFT HAND, UNSPECIFIED FINGER: Primary | ICD-10-CM

## 2022-08-17 DIAGNOSIS — M65.30 TRIGGER FINGER OF RIGHT HAND, UNSPECIFIED FINGER: ICD-10-CM

## 2022-08-17 DIAGNOSIS — R29.898 TMJ CLICK: ICD-10-CM

## 2022-08-17 PROCEDURE — 99213 OFFICE O/P EST LOW 20 MIN: CPT | Performed by: FAMILY MEDICINE

## 2022-08-17 RX ORDER — PRAVASTATIN SODIUM 20 MG
20 TABLET ORAL DAILY
COMMUNITY
End: 2022-10-05 | Stop reason: SDUPTHER

## 2022-08-17 NOTE — PROGRESS NOTES
"  Subjective   Perez Bolaños is a 72 y.o. male who is here for   Chief Complaint   Patient presents with   • Jaw Pain     No pain, jaw popping every time he eats x 2 weeks.    .     History of Present Illness   Bills fingers are catching.  Been going on for several weeks now.  On the right hand its his middle finger flexor tendon and on the left hand its his ring finger    Also he is having popping in both TMJ, no pain    The following portions of the patient's history were reviewed and updated as appropriate: allergies, current medications, past medical history, past social history, past surgical history and problem list.    Review of Systems    Objective   Vitals:    08/17/22 1455   BP: 148/80   BP Location: Left arm   Patient Position: Sitting   Cuff Size: Adult   Pulse: 75   Temp: 98.2 °F (36.8 °C)   SpO2: 95%   Weight: 82.6 kg (182 lb)   Height: 175.3 cm (69\")      Physical Exam  HENT:      Head:      Jaw: No swelling or pain on movement.     Patient is able to flex and close all 5 digits well.  But is unable to extend the right middle finger and left ring finger due to a trigger finger    Assessment & Plan   Diagnoses and all orders for this visit:    1. Trigger finger of left hand, unspecified finger (Primary)    2. Trigger finger of right hand, unspecified finger    3. TMJ click    Voltaren gel, to both trigger fingers and TMJ 2-3 times a day.  Wife is asking about chiropractic care for TMJ.  I will probably be a good idea  Or perhaps seeing his dentist      There are no Patient Instructions on file for this visit.    There are no discontinued medications.     No follow-ups on file.    Dr. Nav Carmona  [place], [place]    "

## 2022-08-22 DIAGNOSIS — Z87.11 HISTORY OF GASTRIC ULCER: ICD-10-CM

## 2022-08-23 RX ORDER — PANTOPRAZOLE SODIUM 40 MG/1
TABLET, DELAYED RELEASE ORAL
Qty: 90 TABLET | Refills: 3 | OUTPATIENT
Start: 2022-08-23

## 2022-08-25 DIAGNOSIS — Z87.11 HISTORY OF GASTRIC ULCER: ICD-10-CM

## 2022-08-26 RX ORDER — PANTOPRAZOLE SODIUM 40 MG/1
TABLET, DELAYED RELEASE ORAL
Qty: 90 TABLET | Refills: 3 | Status: SHIPPED | OUTPATIENT
Start: 2022-08-26 | End: 2023-04-03 | Stop reason: SDUPTHER

## 2022-09-26 DIAGNOSIS — E55.9 VITAMIN D DEFICIENCY: ICD-10-CM

## 2022-09-26 DIAGNOSIS — Z51.81 MEDICATION MONITORING ENCOUNTER: ICD-10-CM

## 2022-09-26 DIAGNOSIS — E78.2 MIXED HYPERLIPIDEMIA: Primary | ICD-10-CM

## 2022-09-26 DIAGNOSIS — R94.6 ABNORMAL RESULTS OF THYROID FUNCTION STUDIES: ICD-10-CM

## 2022-09-26 DIAGNOSIS — Z12.5 SCREENING FOR PROSTATE CANCER: ICD-10-CM

## 2022-09-26 DIAGNOSIS — R79.89 LOW TESTOSTERONE LEVEL IN MALE: ICD-10-CM

## 2022-09-30 LAB
25(OH)D3+25(OH)D2 SERPL-MCNC: 57.4 NG/ML (ref 30–100)
ALT SERPL-CCNC: 20 U/L (ref 1–41)
APPEARANCE UR: CLEAR
BILIRUB UR QL STRIP: NEGATIVE
BUN SERPL-MCNC: 21 MG/DL (ref 8–23)
BUN/CREAT SERPL: 17.9 (ref 7–25)
CALCIUM SERPL-MCNC: 9.1 MG/DL (ref 8.6–10.5)
CHLORIDE SERPL-SCNC: 104 MMOL/L (ref 98–107)
CHOLEST SERPL-MCNC: 145 MG/DL (ref 0–200)
CO2 SERPL-SCNC: 25.5 MMOL/L (ref 22–29)
COLOR UR: YELLOW
CREAT SERPL-MCNC: 1.17 MG/DL (ref 0.76–1.27)
EGFRCR SERPLBLD CKD-EPI 2021: 65.4 ML/MIN/1.73
ERYTHROCYTE [DISTWIDTH] IN BLOOD BY AUTOMATED COUNT: 13 % (ref 12.3–15.4)
GLUCOSE SERPL-MCNC: 103 MG/DL (ref 65–99)
GLUCOSE UR QL STRIP: NEGATIVE
HCT VFR BLD AUTO: 41.3 % (ref 37.5–51)
HDLC SERPL-MCNC: 60 MG/DL (ref 40–60)
HGB BLD-MCNC: 13.7 G/DL (ref 13–17.7)
HGB UR QL STRIP: NEGATIVE
KETONES UR QL STRIP: NEGATIVE
LDLC SERPL CALC-MCNC: 72 MG/DL (ref 0–100)
LEUKOCYTE ESTERASE UR QL STRIP: NEGATIVE
MCH RBC QN AUTO: 30.5 PG (ref 26.6–33)
MCHC RBC AUTO-ENTMCNC: 33.2 G/DL (ref 31.5–35.7)
MCV RBC AUTO: 92 FL (ref 79–97)
NITRITE UR QL STRIP: NEGATIVE
PH UR STRIP: 6.5 [PH] (ref 5–8)
PLATELET # BLD AUTO: 209 10*3/MM3 (ref 140–450)
POTASSIUM SERPL-SCNC: 4.2 MMOL/L (ref 3.5–5.2)
PROT UR QL STRIP: NEGATIVE
PSA SERPL-MCNC: 0.65 NG/ML (ref 0–4)
RBC # BLD AUTO: 4.49 10*6/MM3 (ref 4.14–5.8)
SODIUM SERPL-SCNC: 139 MMOL/L (ref 136–145)
SP GR UR STRIP: 1.02 (ref 1–1.03)
TESTOST FREE SERPL-MCNC: 11.6 PG/ML (ref 6.6–18.1)
TESTOST SERPL-MCNC: 692 NG/DL (ref 264–916)
TRIGL SERPL-MCNC: 61 MG/DL (ref 0–150)
TSH SERPL DL<=0.005 MIU/L-ACNC: 1.55 UIU/ML (ref 0.27–4.2)
UROBILINOGEN UR STRIP-MCNC: NORMAL MG/DL
VLDLC SERPL CALC-MCNC: 13 MG/DL (ref 5–40)
WBC # BLD AUTO: 6.61 10*3/MM3 (ref 3.4–10.8)

## 2022-10-05 ENCOUNTER — OFFICE VISIT (OUTPATIENT)
Dept: FAMILY MEDICINE CLINIC | Facility: CLINIC | Age: 72
End: 2022-10-05

## 2022-10-05 VITALS
BODY MASS INDEX: 26.66 KG/M2 | TEMPERATURE: 98.4 F | WEIGHT: 180 LBS | OXYGEN SATURATION: 97 % | DIASTOLIC BLOOD PRESSURE: 76 MMHG | HEIGHT: 69 IN | SYSTOLIC BLOOD PRESSURE: 120 MMHG | HEART RATE: 60 BPM

## 2022-10-05 DIAGNOSIS — E78.2 MIXED HYPERLIPIDEMIA: ICD-10-CM

## 2022-10-05 DIAGNOSIS — E55.9 VITAMIN D DEFICIENCY: ICD-10-CM

## 2022-10-05 DIAGNOSIS — Z87.11 HISTORY OF GASTRIC ULCER: ICD-10-CM

## 2022-10-05 DIAGNOSIS — R79.89 LOW TESTOSTERONE IN MALE: ICD-10-CM

## 2022-10-05 DIAGNOSIS — Z12.11 SCREENING FOR COLON CANCER: ICD-10-CM

## 2022-10-05 DIAGNOSIS — Z79.899 ENCOUNTER FOR MONITORING STATIN THERAPY: ICD-10-CM

## 2022-10-05 DIAGNOSIS — Z12.5 SCREENING FOR PROSTATE CANCER: ICD-10-CM

## 2022-10-05 DIAGNOSIS — Z51.81 ENCOUNTER FOR MONITORING STATIN THERAPY: ICD-10-CM

## 2022-10-05 DIAGNOSIS — Z00.00 MEDICARE ANNUAL WELLNESS VISIT, SUBSEQUENT: Primary | ICD-10-CM

## 2022-10-05 PROCEDURE — 1170F FXNL STATUS ASSESSED: CPT | Performed by: FAMILY MEDICINE

## 2022-10-05 PROCEDURE — G0439 PPPS, SUBSEQ VISIT: HCPCS | Performed by: FAMILY MEDICINE

## 2022-10-05 PROCEDURE — 1160F RVW MEDS BY RX/DR IN RCRD: CPT | Performed by: FAMILY MEDICINE

## 2022-10-05 RX ORDER — PRAVASTATIN SODIUM 20 MG
20 TABLET ORAL DAILY
Qty: 90 TABLET | Refills: 3 | Status: SHIPPED | OUTPATIENT
Start: 2022-10-05

## 2022-10-05 NOTE — PROGRESS NOTES
The ABCs of the Annual Wellness Visit  Subsequent Medicare Wellness Visit    Chief Complaint   Patient presents with   • Medicare Wellness-subsequent      Subjective    History of Present Illness:  Perez Bolaños is a 72 y.o. male who presents for a Subsequent Medicare Wellness Visit.    The following portions of the patient's history were reviewed and   updated as appropriate: allergies, current medications, past family history, past medical history, past social history, past surgical history and problem list.    Compared to one year ago, the patient feels his physical   health is the same.    Compared to one year ago, the patient feels his mental   health is the same.    Recent Hospitalizations:  This patient has had a [facility] admission record on file within the last 365 days.    Current Medical Providers:  Patient Care Team:  Nav Carmona MD as PCP - General (Family Medicine)  Stacia, Harvinder Awan MD as Consulting Physician (Gastroenterology)  Tyler Jean MD as Consulting Physician (Orthopedic Surgery)    Outpatient Medications Prior to Visit   Medication Sig Dispense Refill   • acetaminophen (TYLENOL) 500 MG tablet Take 500 mg by mouth Every 6 (Six) Hours As Needed for Mild Pain .     • CHLORPHENIRAMINE MALEATE PO Take 4 mg by mouth As Needed.     • magnesium oxide (MAG-OX) 400 MG tablet Take 120 mg by mouth Daily.     • Multiple Vitamins-Minerals (MULTIVITAMIN WITH MINERALS) tablet tablet Take 1 tablet by mouth Daily.     • NON FORMULARY 500 mg Every Night. L- tryptophan     • pantoprazole (PROTONIX) 40 MG EC tablet TAKE 1 TABLET BY MOUTH  DAILY 90 tablet 3   • Probiotic Product (PROBIOTIC ADVANCED PO) Take 1 tablet by mouth Every Morning.     • sildenafil (REVATIO) 20 MG tablet Take 1 tablet by mouth Daily As Needed (ED). 30 tablet 5   • clomiPHENE (CLOMID) 50 MG tablet TAKE 1 TABLET BY MOUTH  DAILY 90 tablet 1   • pravastatin (PRAVACHOL) 20 MG tablet Take 20 mg by mouth Daily.   "     No facility-administered medications prior to visit.       No opioid medication identified on active medication list. I have reviewed chart for other potential  high risk medication/s and harmful drug interactions in the elderly.          Aspirin is not on active medication list.  Aspirin use is not indicated based on review of current medical condition/s. Risk of harm outweighs potential benefits.  .    Patient Active Problem List   Diagnosis   • Sleep apnea   • MI (mitral incompetence)   • Seasonal allergies   • Mixed hyperlipidemia   • Arthritis   • Screening for prostate cancer   • Encounter for monitoring statin therapy   • Vitamin D deficiency   • Medicare annual wellness visit, subsequent   • Umbilical hernia without obstruction and without gangrene   • History of gastric ulcer   • COVID-19 virus infection   • Pneumonia due to COVID-19 virus   • Cytokine release syndrome, grade 1   • Acute deep vein thrombosis (DVT) of calf muscle vein of both lower extremities (HCC)   • Elevated LFTs   • Screening for colon cancer     Advance Care Planning  Advance Directive is not on file.  ACP discussion was held with the patient during this visit. Patient has an advance directive (not in EMR), copy requested.          Objective    Vitals:    10/05/22 1338   BP: 120/76   BP Location: Left arm   Patient Position: Sitting   Cuff Size: Adult   Pulse: 60   Temp: 98.4 °F (36.9 °C)   SpO2: 97%   Weight: 81.6 kg (180 lb)   Height: 175.3 cm (69\")     Estimated body mass index is 26.58 kg/m² as calculated from the following:    Height as of this encounter: 175.3 cm (69\").    Weight as of this encounter: 81.6 kg (180 lb).    BMI is >= 25 and <30. (Overweight) The following options were offered after discussion;: nutrition counseling/recommendations      Does the patient have evidence of cognitive impairment? No    Physical Exam  Vitals reviewed.   Cardiovascular:      Rate and Rhythm: Normal rate.   Pulmonary:      Effort: " Pulmonary effort is normal.   Neurological:      Mental Status: He is alert.       Lab Results   Component Value Date    CHLPL 145 09/27/2022    TRIG 61 09/27/2022    HDL 60 09/27/2022    LDL 72 09/27/2022    VLDL 13 09/27/2022            HEALTH RISK ASSESSMENT    Smoking Status:  Social History     Tobacco Use   Smoking Status Never Smoker   Smokeless Tobacco Never Used     Alcohol Consumption:  Social History     Substance and Sexual Activity   Alcohol Use Yes   • Alcohol/week: 2.0 - 3.0 standard drinks   • Types: 2 - 3 Cans of beer per week    Comment: weekend     Fall Risk Screen:    STEADI Fall Risk Assessment was completed, and patient is at LOW risk for falls.Assessment completed on:10/5/2022    Depression Screening:  PHQ-2/PHQ-9 Depression Screening 10/5/2022   Retired PHQ-9 Total Score -   Retired Total Score -   Little Interest or Pleasure in Doing Things 0-->not at all   Feeling Down, Depressed or Hopeless 0-->not at all   PHQ-9: Brief Depression Severity Measure Score 0       Health Habits and Functional and Cognitive Screening:  Functional & Cognitive Status 10/5/2022   Do you have difficulty preparing food and eating? No   Do you have difficulty bathing yourself, getting dressed or grooming yourself? No   Do you have difficulty using the toilet? No   Do you have difficulty moving around from place to place? No   Do you have trouble with steps or getting out of a bed or a chair? No   Current Diet Well Balanced Diet   Dental Exam Up to date   Eye Exam Not up to date   Exercise (times per week) 5 times per week   Current Exercises Include Light Weights;Treadmill   Current Exercise Activities Include -   Do you need help using the phone?  No   Are you deaf or do you have serious difficulty hearing?  Yes   Do you need help with transportation? No   Do you need help shopping? No   Do you need help preparing meals?  No   Do you need help with housework?  No   Do you need help with laundry? No   Do you need  help taking your medications? No   Do you need help managing money? No   Do you ever drive or ride in a car without wearing a seat belt? No   Have you felt unusual stress, anger or loneliness in the last month? No   Who do you live with? Spouse   If you need help, do you have trouble finding someone available to you? No   Have you been bothered in the last four weeks by sexual problems? No   Do you have difficulty concentrating, remembering or making decisions? No       Age-appropriate Screening Schedule:  Refer to the list below for future screening recommendations based on patient's age, sex and/or medical conditions. Orders for these recommended tests are listed in the plan section. The patient has been provided with a written plan.    Health Maintenance   Topic Date Due   • INFLUENZA VACCINE  03/31/2023 (Originally 8/1/2022)   • ZOSTER VACCINE (1 of 2) 10/05/2032 (Originally 4/13/1998)   • LIPID PANEL  09/27/2023   • TDAP/TD VACCINES (2 - Tdap) 08/19/2028              Assessment & Plan   CMS Preventative Services Quick Reference  Risk Factors Identified During Encounter  None Identified  The above risks/problems have been discussed with the patient.  Follow up actions/plans if indicated are seen below in the Assessment/Plan Section.  Pertinent information has been shared with the patient in the After Visit Summary.    Diagnoses and all orders for this visit:    1. Medicare annual wellness visit, subsequent (Primary)    2. Screening for colon cancer  -     Ambulatory Referral For Screening Colonoscopy    3. Mixed hyperlipidemia  -     pravastatin (PRAVACHOL) 20 MG tablet; Take 1 tablet by mouth Daily. Indications: High Amount of Fats in the Blood  Dispense: 90 tablet; Refill: 3  -     Lipid Panel; Future  -     TSH; Future    4. Screening for prostate cancer  -     PSA Screen; Future    5. Vitamin D deficiency  -     Vitamin D 25 Hydroxy; Future    6. History of gastric ulcer  -     CBC (No Diff); Future  -      Comprehensive Metabolic Panel; Future    7. Encounter for monitoring statin therapy  -     Comprehensive Metabolic Panel; Future    8. Low testosterone in male  -     Testosterone, Free, Total; Future    Other orders  -     clomiPHENE (CLOMID) 50 MG tablet; Take 1 tablet by mouth Daily.  Dispense: 90 tablet; Refill: 3    Doing very well.  Labs all in good shape.  Healed up nicely from his upper GI bleed.  Will send him back to see Dr. Titi ty for his routine screening colonoscopy  Refill meds for a year  She request no vaccines    Follow Up:   Return in about 1 year (around 10/5/2023) for Medicare Wellness visit.     An After Visit Summary and PPPS were made available to the patient.

## 2022-10-06 ENCOUNTER — TELEPHONE (OUTPATIENT)
Dept: GASTROENTEROLOGY | Facility: CLINIC | Age: 72
End: 2022-10-06

## 2022-10-06 NOTE — TELEPHONE ENCOUNTER
----- Message from Chana Brar sent at 10/6/2022 10:11 AM EDT -----  Contact: [phone]  Contact patient for oa [email or web address]

## 2022-11-08 ENCOUNTER — PRE-PROCEDURE SCREENING (OUTPATIENT)
Dept: GASTROENTEROLOGY | Facility: CLINIC | Age: 72
End: 2022-11-08

## 2022-11-08 NOTE — TELEPHONE ENCOUNTER
Last scope 11/16 --Personal history of polyps--No family history of polyps or colon cancer--No blood thinners--Medications:                  acetaminophen (TYLENOL) 500 MG tablet  CHLORPHENIRAMINE MALEATE PO  clomiPHENE (CLOMID) 50 MG tablet  magnesium oxide (MAG-OX) 400 MG tablet  Multiple Vitamins-Minerals (MULTIVITAMIN WITH MINERALS) tablet tablet  NON FORMULARY  pantoprazole (PROTONIX) 40 MG EC tablet  pravastatin (PRAVACHOL) 20 MG tablet  Probiotic Product (PROBIOTIC ADVANCED PO)  sildenafil (REVATIO) 20 MG tablet\        Pt verbalized and understood that it would be few weeks before been schedule

## 2022-11-10 ENCOUNTER — PREP FOR SURGERY (OUTPATIENT)
Dept: OTHER | Facility: HOSPITAL | Age: 72
End: 2022-11-10

## 2022-11-10 DIAGNOSIS — K63.5 COLON POLYPS: Primary | ICD-10-CM

## 2022-11-30 ENCOUNTER — TELEPHONE (OUTPATIENT)
Dept: GASTROENTEROLOGY | Facility: CLINIC | Age: 72
End: 2022-11-30

## 2023-02-06 ENCOUNTER — TELEPHONE (OUTPATIENT)
Dept: FAMILY MEDICINE CLINIC | Facility: CLINIC | Age: 73
End: 2023-02-06

## 2023-02-06 NOTE — TELEPHONE ENCOUNTER
Caller: Wanda Bolaños    Relationship: Emergency Contact    Best call back number: [phone]    What was the call regarding: WANDA IS CALLING TO LET DR MARLEY KNOW THAT PATIENT'S HOME DELIVERY PHARMACY HAS CHANGED TO     [facility] - [address] [phone] [facility] [phone]

## 2023-04-03 DIAGNOSIS — Z87.11 HISTORY OF GASTRIC ULCER: ICD-10-CM

## 2023-04-03 RX ORDER — PANTOPRAZOLE SODIUM 40 MG/1
40 TABLET, DELAYED RELEASE ORAL DAILY
Qty: 90 TABLET | Refills: 0 | Status: SHIPPED | OUTPATIENT
Start: 2023-04-03 | End: 2023-04-17 | Stop reason: SDUPTHER

## 2023-04-03 NOTE — TELEPHONE ENCOUNTER
Caller: Wanda Bolaños    Relationship: Emergency Contact    Best call back number:     Requested Prescriptions:   Requested Prescriptions     Pending Prescriptions Disp Refills   • pantoprazole (PROTONIX) 40 MG EC tablet 90 tablet 3     Sig: Take 1 tablet by mouth Daily.        Pharmacy where request should be sent: [facility] - [address] [phone] [facility] [phone]      Last office visit with prescribing clinician: 10/5/2022   Last telemedicine visit with prescribing clinician: Visit date not found   Next office visit with prescribing clinician: 10/9/2023     Additional details provided by patient:     Does the patient have less than a 3 day supply:  [] Yes  [x] No    Would you like a call back once the refill request has been completed: [] Yes [] No    If the office needs to give you a call back, can they leave a voicemail: [] Yes [] No    Chana Esparza   04/03/23 11:49 EDT

## 2023-04-17 DIAGNOSIS — Z87.11 HISTORY OF GASTRIC ULCER: ICD-10-CM

## 2023-04-17 RX ORDER — PANTOPRAZOLE SODIUM 40 MG/1
40 TABLET, DELAYED RELEASE ORAL DAILY
Qty: 90 TABLET | Refills: 1 | Status: SHIPPED | OUTPATIENT
Start: 2023-04-17

## 2023-04-17 NOTE — TELEPHONE ENCOUNTER
Caller: Wanda Bolaños    Relationship: Emergency Contact    Best call back number:[phone] Prescriptions:   Requested Prescriptions     Pending Prescriptions Disp Refills   • pantoprazole (PROTONIX) 40 MG EC tablet 90 tablet 0     Sig: Take 1 tablet by mouth Daily.        Pharmacy where request should be sent: [facility] - [place] [address] [facility] [phone] [facility] [phone] [facility]     Last office visit with prescribing clinician: 10/5/2022   Last telemedicine visit with prescribing clinician: 10/2/2023   Next office visit with prescribing clinician: 10/9/2023     Additional details provided by patient: PLEASE SEND ALL MATIENANCE MEDICATION THROUGH [facility]  Does the patient have less than a 3 day supply:  [] Yes  [x] No    Would you like a call back once the refill request has been completed: [x] Yes [] No    If the office needs to give you a call back, can they leave a voicemail: [x] Yes [] No    Chana Blackburn Rep   04/17/23 14:54 EDT

## 2023-06-13 ENCOUNTER — TELEPHONE (OUTPATIENT)
Dept: GASTROENTEROLOGY | Facility: CLINIC | Age: 73
End: 2023-06-13
Payer: MEDICARE

## 2023-06-13 PROBLEM — K63.5 COLON POLYPS: Status: ACTIVE | Noted: 2023-06-13

## 2023-06-13 NOTE — TELEPHONE ENCOUNTER
OK FOR HUB TO READ  PT IS [facility] FOR A COLONOSCOPY ON 12/1/23  MAILED [facility] PREP [facility] W/ DESTINY

## 2023-08-24 ENCOUNTER — OFFICE VISIT (OUTPATIENT)
Dept: FAMILY MEDICINE CLINIC | Facility: CLINIC | Age: 73
End: 2023-08-24
Payer: MEDICARE

## 2023-08-24 ENCOUNTER — HOSPITAL ENCOUNTER (OUTPATIENT)
Dept: GENERAL RADIOLOGY | Facility: HOSPITAL | Age: 73
Discharge: HOME | End: 2023-08-24
Admitting: FAMILY MEDICINE
Payer: MEDICARE

## 2023-08-24 VITALS
HEIGHT: 69 IN | BODY MASS INDEX: 27.11 KG/M2 | SYSTOLIC BLOOD PRESSURE: 122 MMHG | TEMPERATURE: 97.3 F | OXYGEN SATURATION: 97 % | WEIGHT: 183 LBS | HEART RATE: 90 BPM | DIASTOLIC BLOOD PRESSURE: 78 MMHG

## 2023-08-24 DIAGNOSIS — M25.552 CHRONIC LEFT HIP PAIN: Primary | ICD-10-CM

## 2023-08-24 DIAGNOSIS — G89.29 CHRONIC LEFT HIP PAIN: Primary | ICD-10-CM

## 2023-08-24 DIAGNOSIS — M25.552 CHRONIC LEFT HIP PAIN: ICD-10-CM

## 2023-08-24 DIAGNOSIS — G89.29 CHRONIC LEFT HIP PAIN: ICD-10-CM

## 2023-08-24 PROCEDURE — 1160F RVW MEDS BY RX/DR IN RCRD: CPT | Performed by: FAMILY MEDICINE

## 2023-08-24 PROCEDURE — 73521 X-RAY EXAM HIPS BI 2 VIEWS: CPT

## 2023-08-24 PROCEDURE — 1159F MED LIST DOCD IN RCRD: CPT | Performed by: FAMILY MEDICINE

## 2023-08-24 PROCEDURE — 99213 OFFICE O/P EST LOW 20 MIN: CPT | Performed by: FAMILY MEDICINE

## 2023-08-24 NOTE — PROGRESS NOTES
"  Subjective   Perez Bolaños is a 73 y.o. male who is here for   Chief Complaint   Patient presents with    Hip Pain   .   Answers submitted by the patient for this visit:  Primary Reason for Visit (Submitted on 8/24/2023)  What is the primary reason for your visit?: Other  Other (Submitted on 8/24/2023)  Please describe your symptoms.: Frequent hip pain  Have you had these symptoms before?: Yes  How long have you been having these symptoms?: Greater than 2 weeks  Please list any medications you are currently taking for this condition.: Tylenol  Please describe any probable cause for these symptoms. : Age    Hip Pain   Bill's been having progressive bilateral hip pain for years.  The left is progressing over the past few months.  Having anterior hip pain in the inguinal area.  No fall or injury      The following portions of the patient's history were reviewed and updated as appropriate: allergies, current medications, past family history, past medical history, past social history, past surgical history, and problem list.    Review of Systems    Objective   Vitals:    08/24/23 1053   BP: 122/78   Pulse: 90   Temp: 97.3 øF (36.3 øC)   SpO2: 97%   Weight: 83 kg (183 lb)   Height: 175.3 cm (69.02\")      Physical Exam  Musculoskeletal:      Right hip: Normal.      Left hip: Tenderness present.        Legs:        Assessment & Plan   Diagnoses and all orders for this visit:    1. Chronic left hip pain (Primary)  -     XR Hips Bilateral With or Without Pelvis 2 View; Future  -     XR Hip With or Without Pelvis 2 - 3 View Left; Future  -     Ambulatory Referral to Orthopedic Surgery    We will check x-rays of both hips  Suggest Osteo Bi-Flex  Continue Tylenol for pain.  Bilateral take anti-inflammatories due to history of gastric ulcer  Referral into orthopedics  There are no Patient Instructions on file for this visit.    There are no discontinued medications.     Return in about 6 months (around 2/24/2024) for Medicare " Wellness visit.    Dr. Nav Carmona  [place], [place]

## 2023-08-30 DIAGNOSIS — Z87.11 HISTORY OF GASTRIC ULCER: ICD-10-CM

## 2023-08-31 RX ORDER — PANTOPRAZOLE SODIUM 40 MG/1
40 TABLET, DELAYED RELEASE ORAL DAILY
Qty: 90 TABLET | Refills: 3 | Status: SHIPPED | OUTPATIENT
Start: 2023-08-31

## 2023-09-05 ENCOUNTER — TELEPHONE (OUTPATIENT)
Dept: FAMILY MEDICINE CLINIC | Facility: CLINIC | Age: 73
End: 2023-09-05

## 2023-09-05 NOTE — TELEPHONE ENCOUNTER
Caller: Wanda Bolaños    Relationship: Emergency Contact    Best call back number: [phone]    What is the medical concern/diagnosis: HIP PAIN    What is the provider, practice or medical service name: [facility]    What is the office phone number: [phone]    Any additional details: PATIENTS WIFE STATED PATIENT WOULD LIKE TO REQUEST A REFERRAL BE SENT TO THIS DOCTOR INSTEAD OF THE PREVIOUS REFERRAL SENT OUT.    HE WOULD ALSO LIKE TO REQUEST HIS XRAYS TO BE SENT AS WELL.    PLEASE CALL ONCE SENT.

## 2023-10-09 ENCOUNTER — TELEPHONE (OUTPATIENT)
Dept: GASTROENTEROLOGY | Facility: CLINIC | Age: 73
End: 2023-10-09
Payer: MEDICARE

## 2023-10-09 NOTE — TELEPHONE ENCOUNTER
Caller: Wanda Bolaños    Relationship to patient: Emergency Contact    Best call back number: [phone]    Chief complaint: CANCEL SCOPE    Type of visit: SCOPE    When is the original appointment: 10.18.23     Additional notes:  PTS WIFE  CALLED TO CANCEL SCOPE. PTS WIFE STATES THEY ARE CANCELLING FOR FINANCIAL REASONS/LARGE CO PAY. CAN'T DO IT NOW

## 2023-10-09 NOTE — TELEPHONE ENCOUNTER
Caller: Wanda Bolaños    Relationship to patient: Emergency Contact    Best call back number: [phone]    Chief complaint: CANCEL SCOPE    Type of visit: SCOPE    When is the original appointment: 10.18.23     Additional notes:  PTS WIFE  CALLED TO CANCEL SCOPE. PTS WIFE STATES THEY ARE CANCELLING FOR FINANCIAL REASONS/LARGE CO PAY. CAN'T DO IT NOW

## 2023-10-16 DIAGNOSIS — Z12.5 SCREENING FOR PROSTATE CANCER: ICD-10-CM

## 2023-10-16 DIAGNOSIS — Z00.00 MEDICARE ANNUAL WELLNESS VISIT, SUBSEQUENT: ICD-10-CM

## 2023-10-16 DIAGNOSIS — E55.9 VITAMIN D DEFICIENCY: ICD-10-CM

## 2023-10-16 DIAGNOSIS — R79.89 LOW TESTOSTERONE IN MALE: ICD-10-CM

## 2023-10-16 DIAGNOSIS — E78.2 MIXED HYPERLIPIDEMIA: Primary | ICD-10-CM

## 2023-10-18 DIAGNOSIS — R79.89 LOW TESTOSTERONE IN MALE: ICD-10-CM

## 2023-10-18 DIAGNOSIS — Z12.5 SCREENING FOR PROSTATE CANCER: ICD-10-CM

## 2023-10-18 DIAGNOSIS — E78.2 MIXED HYPERLIPIDEMIA: ICD-10-CM

## 2023-10-18 DIAGNOSIS — Z00.00 MEDICARE ANNUAL WELLNESS VISIT, SUBSEQUENT: ICD-10-CM

## 2023-10-18 DIAGNOSIS — E55.9 VITAMIN D DEFICIENCY: ICD-10-CM

## 2023-10-28 LAB
25(OH)D3+25(OH)D2 SERPL-MCNC: 64 NG/ML (ref 30–100)
ALBUMIN SERPL-MCNC: 4.3 G/DL (ref 3.5–5.2)
ALBUMIN/GLOB SERPL: 2.3 G/DL
ALP SERPL-CCNC: 54 U/L (ref 39–117)
ALT SERPL-CCNC: 24 U/L (ref 1–41)
AST SERPL-CCNC: 23 U/L (ref 1–40)
BILIRUB SERPL-MCNC: 0.3 MG/DL (ref 0–1.2)
BUN SERPL-MCNC: 17 MG/DL (ref 8–23)
BUN/CREAT SERPL: 14 (ref 7–25)
CALCIUM SERPL-MCNC: 9.4 MG/DL (ref 8.6–10.5)
CHLORIDE SERPL-SCNC: 106 MMOL/L (ref 98–107)
CHOLEST SERPL-MCNC: 134 MG/DL (ref 0–200)
CO2 SERPL-SCNC: 27.8 MMOL/L (ref 22–29)
CREAT SERPL-MCNC: 1.21 MG/DL (ref 0.76–1.27)
EGFRCR SERPLBLD CKD-EPI 2021: 62.4 ML/MIN/1.73
ERYTHROCYTE [DISTWIDTH] IN BLOOD BY AUTOMATED COUNT: 12.2 % (ref 12.3–15.4)
GLOBULIN SER CALC-MCNC: 1.9 GM/DL
GLUCOSE SERPL-MCNC: 95 MG/DL (ref 65–99)
HCT VFR BLD AUTO: 42.4 % (ref 37.5–51)
HDLC SERPL-MCNC: 51 MG/DL (ref 40–60)
HGB BLD-MCNC: 14.3 G/DL (ref 13–17.7)
LDLC SERPL CALC-MCNC: 67 MG/DL (ref 0–100)
MCH RBC QN AUTO: 31 PG (ref 26.6–33)
MCHC RBC AUTO-ENTMCNC: 33.7 G/DL (ref 31.5–35.7)
MCV RBC AUTO: 91.8 FL (ref 79–97)
PLATELET # BLD AUTO: 227 10*3/MM3 (ref 140–450)
POTASSIUM SERPL-SCNC: 5 MMOL/L (ref 3.5–5.2)
PROT SERPL-MCNC: 6.2 G/DL (ref 6–8.5)
PSA SERPL-MCNC: 0.41 NG/ML (ref 0–4)
RBC # BLD AUTO: 4.62 10*6/MM3 (ref 4.14–5.8)
SODIUM SERPL-SCNC: 141 MMOL/L (ref 136–145)
TESTOST FREE SERPL-MCNC: 8.5 PG/ML (ref 6.6–18.1)
TESTOST SERPL-MCNC: 638.8 NG/DL (ref 264–916)
TRIGL SERPL-MCNC: 85 MG/DL (ref 0–150)
TSH SERPL DL<=0.005 MIU/L-ACNC: 0.85 UIU/ML (ref 0.27–4.2)
VLDLC SERPL CALC-MCNC: 16 MG/DL (ref 5–40)
WBC # BLD AUTO: 7.53 10*3/MM3 (ref 3.4–10.8)

## 2023-11-01 ENCOUNTER — OFFICE VISIT (OUTPATIENT)
Dept: FAMILY MEDICINE CLINIC | Facility: CLINIC | Age: 73
End: 2023-11-01
Payer: MEDICARE

## 2023-11-01 VITALS
HEART RATE: 60 BPM | DIASTOLIC BLOOD PRESSURE: 80 MMHG | WEIGHT: 185 LBS | SYSTOLIC BLOOD PRESSURE: 130 MMHG | TEMPERATURE: 97.8 F | BODY MASS INDEX: 27.4 KG/M2 | HEIGHT: 69 IN | OXYGEN SATURATION: 98 %

## 2023-11-01 DIAGNOSIS — Z87.11 HISTORY OF GASTRIC ULCER: ICD-10-CM

## 2023-11-01 DIAGNOSIS — Z12.5 SCREENING FOR PROSTATE CANCER: ICD-10-CM

## 2023-11-01 DIAGNOSIS — E78.2 MIXED HYPERLIPIDEMIA: ICD-10-CM

## 2023-11-01 DIAGNOSIS — Z00.00 MEDICARE ANNUAL WELLNESS VISIT, SUBSEQUENT: Primary | ICD-10-CM

## 2023-11-01 PROBLEM — R79.89 ELEVATED LFTS: Status: RESOLVED | Noted: 2022-01-04 | Resolved: 2023-11-01

## 2023-11-01 PROBLEM — J12.82 PNEUMONIA DUE TO COVID-19 VIRUS: Status: RESOLVED | Noted: 2021-12-04 | Resolved: 2023-11-01

## 2023-11-01 PROBLEM — U07.1 COVID-19 VIRUS INFECTION: Status: RESOLVED | Noted: 2021-12-02 | Resolved: 2023-11-01

## 2023-11-01 PROBLEM — D89.831 CYTOKINE RELEASE SYNDROME, GRADE 1: Status: RESOLVED | Noted: 2021-12-11 | Resolved: 2023-11-01

## 2023-11-01 PROBLEM — U07.1 PNEUMONIA DUE TO COVID-19 VIRUS: Status: RESOLVED | Noted: 2021-12-04 | Resolved: 2023-11-01

## 2023-11-01 PROCEDURE — 1160F RVW MEDS BY RX/DR IN RCRD: CPT | Performed by: FAMILY MEDICINE

## 2023-11-01 PROCEDURE — 1170F FXNL STATUS ASSESSED: CPT | Performed by: FAMILY MEDICINE

## 2023-11-01 PROCEDURE — 1159F MED LIST DOCD IN RCRD: CPT | Performed by: FAMILY MEDICINE

## 2023-11-01 PROCEDURE — G0439 PPPS, SUBSEQ VISIT: HCPCS | Performed by: FAMILY MEDICINE

## 2023-11-01 RX ORDER — SODIUM PHOSPHATE,MONO-DIBASIC 19G-7G/118
ENEMA (ML) RECTAL DAILY
COMMUNITY

## 2023-11-01 RX ORDER — PRAVASTATIN SODIUM 20 MG
20 TABLET ORAL DAILY
Qty: 90 TABLET | Refills: 3 | Status: SHIPPED | OUTPATIENT
Start: 2023-11-01

## 2024-05-01 NOTE — TELEPHONE ENCOUNTER
Caller: Wanda Bolaños    Relationship: Emergency Contact    Best call back number: [phone]     Requested Prescriptions:   Requested Prescriptions     Pending Prescriptions Disp Refills    clomiPHENE (CLOMID) 50 MG tablet 90 tablet 3     Sig: Take 1 tablet by mouth Daily.        Pharmacy where request should be sent: [facility] [address] [phone] [facility] [phone]      Last office visit with prescribing clinician: 11/1/2023   Last telemedicine visit with prescribing clinician: Visit date not found   Next office visit with prescribing clinician: 11/8/2024     Additional details provided by patient: PLEASE PRESCRIBE THE GENERIC BRAND ONLY    Does the patient have less than a 3 day supply:  [] Yes  [x] No    Would you like a call back once the refill request has been completed: [] Yes [x] No      Chana Huynh Rep   05/01/24 11:56 EDT

## 2024-08-10 DIAGNOSIS — Z87.11 HISTORY OF GASTRIC ULCER: ICD-10-CM

## 2024-08-11 RX ORDER — PANTOPRAZOLE SODIUM 40 MG/1
40 TABLET, DELAYED RELEASE ORAL DAILY
Qty: 90 TABLET | Refills: 0 | Status: SHIPPED | OUTPATIENT
Start: 2024-08-11

## 2024-09-16 ENCOUNTER — TELEPHONE (OUTPATIENT)
Dept: FAMILY MEDICINE CLINIC | Facility: CLINIC | Age: 74
End: 2024-09-16
Payer: MEDICARE

## 2024-09-16 RX ORDER — CLOMIPHENE CITRATE 50 MG/1
50 TABLET ORAL DAILY
Qty: 90 TABLET | Refills: 0 | Status: SHIPPED | OUTPATIENT
Start: 2024-09-16

## 2024-10-12 DIAGNOSIS — Z87.11 HISTORY OF GASTRIC ULCER: ICD-10-CM

## 2024-10-14 RX ORDER — PANTOPRAZOLE SODIUM 40 MG/1
40 TABLET, DELAYED RELEASE ORAL DAILY
Qty: 90 TABLET | Refills: 3 | Status: SHIPPED | OUTPATIENT
Start: 2024-10-14

## 2024-11-08 ENCOUNTER — OFFICE VISIT (OUTPATIENT)
Dept: FAMILY MEDICINE CLINIC | Facility: CLINIC | Age: 74
End: 2024-11-08
Payer: MEDICARE

## 2024-11-08 VITALS
WEIGHT: 177.1 LBS | TEMPERATURE: 97.1 F | DIASTOLIC BLOOD PRESSURE: 80 MMHG | OXYGEN SATURATION: 95 % | BODY MASS INDEX: 26.23 KG/M2 | HEART RATE: 52 BPM | SYSTOLIC BLOOD PRESSURE: 130 MMHG | HEIGHT: 69 IN

## 2024-11-08 DIAGNOSIS — Z87.11 HISTORY OF GASTRIC ULCER: ICD-10-CM

## 2024-11-08 DIAGNOSIS — R79.89 LOW TESTOSTERONE IN MALE: ICD-10-CM

## 2024-11-08 DIAGNOSIS — Z00.00 MEDICARE ANNUAL WELLNESS VISIT, SUBSEQUENT: Primary | ICD-10-CM

## 2024-11-08 DIAGNOSIS — E78.2 MODERATE MIXED HYPERLIPIDEMIA NOT REQUIRING STATIN THERAPY: ICD-10-CM

## 2024-11-08 DIAGNOSIS — E55.9 VITAMIN D DEFICIENCY: ICD-10-CM

## 2024-11-08 PROCEDURE — 1125F AMNT PAIN NOTED PAIN PRSNT: CPT | Performed by: FAMILY MEDICINE

## 2024-11-08 PROCEDURE — 1159F MED LIST DOCD IN RCRD: CPT | Performed by: FAMILY MEDICINE

## 2024-11-08 PROCEDURE — 1160F RVW MEDS BY RX/DR IN RCRD: CPT | Performed by: FAMILY MEDICINE

## 2024-11-08 PROCEDURE — G0439 PPPS, SUBSEQ VISIT: HCPCS | Performed by: FAMILY MEDICINE

## 2024-11-08 RX ORDER — ACETAMINOPHEN 160 MG
TABLET,DISINTEGRATING ORAL
COMMUNITY
Start: 2014-01-01

## 2024-11-08 RX ORDER — LORATADINE 10 MG/1
TABLET ORAL
COMMUNITY
Start: 2023-01-01

## 2024-11-08 NOTE — PROGRESS NOTES
Subjective   The ABCs of the Annual Wellness Visit  Medicare Wellness Visit      Perez Bolaños is a 74 y.o. patient who presents for a Medicare Wellness Visit.    The following portions of the patient's history were reviewed and   updated as appropriate: allergies, current medications, past family history, past medical history, past social history, past surgical history, and problem list.    Compared to one year ago, the patient's physical   health is the same.  Compared to one year ago, the patient's mental   health is the same.    Recent Hospitalizations:  He was not admitted to the hospital during the last year.     Current Medical Providers:  Patient Care Team:  Nav Carmona MD as PCP - General (Family Medicine)  [facility], Harvinder Awan MD as Consulting Physician (Gastroenterology)  Tyler Jean MD as Consulting Physician (Orthopedic Surgery)    Outpatient Medications Prior to Visit   Medication Sig Dispense Refill    acetaminophen (TYLENOL) 500 MG tablet Take 1 tablet by mouth Every 6 (Six) Hours As Needed for Mild Pain.      CHLORPHENIRAMINE MALEATE PO Take 4 mg by mouth As Needed.      Cholecalciferol (Vitamin D3) 50 MCG (2000 UT) capsule       clomiPHENE (CLOMID) 50 MG tablet Take 1 tablet by mouth Daily. 90 tablet 0    loratadine (CLARITIN) 10 MG tablet       magnesium oxide (MAG-OX) 400 MG tablet Take 120 mg by mouth Daily.      pantoprazole (PROTONIX) 40 MG EC tablet TAKE 1 TABLET BY MOUTH DAILY 90 tablet 3    Probiotic Product (PROBIOTIC ADVANCED PO) Take 1 tablet by mouth Every Morning.      sildenafil (REVATIO) 20 MG tablet Take 1 tablet by mouth Daily As Needed (ED). 30 tablet 5    ZINC CITRATE PO       glucosamine-chondroitin 500-400 MG capsule capsule Take  by mouth Daily.      Multiple Vitamins-Minerals (MULTIVITAMIN WITH MINERALS) tablet tablet Take 1 tablet by mouth Daily.      NON FORMULARY 500 mg Every Night. L- tryptophan      pravastatin (PRAVACHOL) 20 MG tablet Take 1  "tablet by mouth Daily. Indications: High Amount of Fats in the Blood 90 tablet 3     No facility-administered medications prior to visit.     No opioid medication identified on active medication list. I have reviewed chart for other potential  high risk medication/s and harmful drug interactions in the elderly.      Aspirin is not on active medication list.  Aspirin use is not indicated based on review of current medical condition/s. Risk of harm outweighs potential benefits.  .    Patient Active Problem List   Diagnosis    Sleep apnea    MI (mitral incompetence)    Seasonal allergies    Moderate mixed hyperlipidemia not requiring statin therapy    Arthritis    Screening for prostate cancer    Encounter for monitoring statin therapy    Vitamin D deficiency    Medicare annual wellness visit, subsequent    Umbilical hernia without obstruction and without gangrene    History of gastric ulcer    Acute deep vein thrombosis (DVT) of calf muscle vein of both lower extremities    Screening for colon cancer    Colon polyps    Chronic left hip pain     Advance Care Planning Advance Directive is on file.  ACP discussion was held with the patient during this visit. Patient has an advance directive in EMR which is still valid.             Objective   Vitals:    11/08/24 1018   BP: 130/80   BP Location: Left arm   Patient Position: Sitting   Cuff Size: Large Adult   Pulse: 52   Temp: 97.1 °F (36.2 °C)   TempSrc: Infrared   SpO2: 95%   Weight: 80.3 kg (177 lb 1.6 oz)   Height: 175.3 cm (69.02\")   PainSc:   6   PainLoc: Hip       Estimated body mass index is 26.14 kg/m² as calculated from the following:    Height as of this encounter: 175.3 cm (69.02\").    Weight as of this encounter: 80.3 kg (177 lb 1.6 oz).            Does the patient have evidence of cognitive impairment? No  Lab Results   Component Value Date    CHLPL 191 11/01/2024    TRIG 92 11/01/2024    HDL 54 11/01/2024     (H) 11/01/2024    VLDL 17 11/01/2024       "                                                                                         Health  Risk Assessment    Smoking Status:  Social History     Tobacco Use   Smoking Status Never   Smokeless Tobacco Never     Alcohol Consumption:  Social History     Substance and Sexual Activity   Alcohol Use Yes    Alcohol/week: 2.0 - 3.0 standard drinks of alcohol    Types: 2 - 3 Cans of beer per week    Comment: weekend       Fall Risk Screen  STEADI Fall Risk Assessment was completed, and patient is at LOW risk for falls.Assessment completed on:2024    Depression Screenin/8/2024    10:24 AM   PHQ-2/PHQ-9 Depression Screening   Little interest or pleasure in doing things Not at all   Feeling down, depressed, or hopeless Not at all   How difficult have these problems made it for you to do your work, take care of things at home, or get along with other people? Not difficult at all     Health Habits and Functional and Cognitive Screenin/6/2024    10:05 AM   Functional & Cognitive Status   Do you have difficulty preparing food and eating? No    Do you have difficulty bathing yourself, getting dressed or grooming yourself? No    Do you have difficulty using the toilet? No    Do you have difficulty moving around from place to place? Yes    Do you have trouble with steps or getting out of a bed or a chair? Yes    Current Diet Well Balanced Diet    Dental Exam Not up to date    Eye Exam Not up to date    Exercise (times per week) 1 times per week    Current Exercises Include Treadmill    Do you need help using the phone?  No    Are you deaf or do you have serious difficulty hearing?  Yes    Do you need help to go to places out of walking distance? No    Do you need help shopping? No    Do you need help preparing meals?  No    Do you need help with housework?  No    Do you need help with laundry? No    Do you need help taking your medications? No    Do you need help managing money? No    Do you ever drive  or ride in a car without wearing a seat belt? No    Have you felt unusual stress, anger or loneliness in the last month? No    Who do you live with? Spouse    If you need help, do you have trouble finding someone available to you? No    Have you been bothered in the last four weeks by sexual problems? No    Do you have difficulty concentrating, remembering or making decisions? No        Patient-reported           Age-appropriate Screening Schedule:  Refer to the list below for future screening recommendations based on patient's age, sex and/or medical conditions. Orders for these recommended tests are listed in the plan section. The patient has been provided with a written plan.    Health Maintenance List  Health Maintenance   Topic Date Due    RSV Vaccine - Adults (1 - 1-dose 75+ series) Never done    ANNUAL WELLNESS VISIT  11/01/2024    COLORECTAL CANCER SCREENING  12/31/2024 (Originally 1950)    ZOSTER VACCINE (1 of 2) 10/05/2032 (Originally 4/13/1998)    LIPID PANEL  11/01/2025    BMI FOLLOWUP  11/08/2025    TDAP/TD VACCINES (2 - Tdap) 08/19/2028    HEPATITIS C SCREENING  Completed    Pneumococcal Vaccine 65+  Completed    COVID-19 Vaccine  Discontinued    INFLUENZA VACCINE  Discontinued                                                                                                                                                CMS Preventative Services Quick Reference  Risk Factors Identified During Encounter  None Identified    The above risks/problems have been discussed with the patient.  Pertinent information has been shared with the patient in the After Visit Summary.  An After Visit Summary and PPPS were made available to the patient.    Follow Up:   Next Medicare Wellness visit to be scheduled in 1 year.     Assessment & Plan  Medicare annual wellness visit, subsequent         Vitamin D deficiency    Orders:    Vitamin D,25-Hydroxy; Future    Moderate mixed hyperlipidemia not requiring statin  therapy   Lipid abnormalities are stable    Plan:  Patient to stop statin.  Will try red yeast rice.  Recheck 6 months.      Counseled patient on lifestyle modifications to help control hyperlipidemia.     Patient Treatment Goals:   LDL goal is under 130    Followup in 6 months.    Orders:    Lipid Panel; Future    History of gastric ulcer    Orders:    Hemoglobin & Hematocrit, Blood; Future    Low testosterone in male    Orders:    Estrogens, Total; Future    Testosterone, Free, Total; Future         Follow Up:   Return in about 1 year (around 11/8/2025) for Medicare Wellness visit.    Although several of his medications supplements  Stopped his statin  Was check in 6 months  Reports the Clomid is now too expensive  Will recheck testosterone and estrogen in 6 months  Bilateral hip pain is worsening.  May need hip replacement in future.  Talked about seeing Dr. Carl Quintero

## 2024-11-08 NOTE — ASSESSMENT & PLAN NOTE
Lipid abnormalities are stable    Plan:  Patient to stop statin.  Will try red yeast rice.  Recheck 6 months.      Counseled patient on lifestyle modifications to help control hyperlipidemia.     Patient Treatment Goals:   LDL goal is under 130    Followup in 6 months.    Orders:    Lipid Panel; Future

## 2025-03-03 ENCOUNTER — TELEPHONE (OUTPATIENT)
Dept: FAMILY MEDICINE CLINIC | Facility: CLINIC | Age: 75
End: 2025-03-03

## 2025-03-03 NOTE — TELEPHONE ENCOUNTER
Caller: Wanda Bolaños    Relationship: Emergency Contact    Best call back number: [phone]       What was the call regarding:       PATIENT HAS A UPCOMING LAB APPOINTMENT ON 5/6/2025 AND WANTS TO KNOW WHY THESE LABS ARE NEEDED.    ALSO WANTS TO KNOW DOES A APPOINTMENT NEED TO BE SCHEDULED TO FOLLOW UP ON THE LABS?    PLEASE ADVISE.

## 2025-03-03 NOTE — TELEPHONE ENCOUNTER
Spoke to Wanda and advised her that due to stopping some of the patients medications, Dr. Carmona wanted to recheck labs in 6 months. Patient voiced understanding and was ok with it.

## 2025-07-31 ENCOUNTER — LAB (OUTPATIENT)
Dept: LAB | Facility: HOSPITAL | Age: 75
End: 2025-07-31
Payer: MEDICARE

## 2025-07-31 ENCOUNTER — OFFICE VISIT (OUTPATIENT)
Dept: FAMILY MEDICINE CLINIC | Facility: CLINIC | Age: 75
End: 2025-07-31
Payer: MEDICARE

## 2025-07-31 VITALS
SYSTOLIC BLOOD PRESSURE: 154 MMHG | WEIGHT: 178 LBS | HEART RATE: 97 BPM | OXYGEN SATURATION: 94 % | BODY MASS INDEX: 26.36 KG/M2 | DIASTOLIC BLOOD PRESSURE: 78 MMHG | TEMPERATURE: 99.7 F | HEIGHT: 69 IN

## 2025-07-31 DIAGNOSIS — R41.89 BRAIN FOG: ICD-10-CM

## 2025-07-31 DIAGNOSIS — S70.361A INFECTED INSECT BITE OF RIGHT THIGH, INITIAL ENCOUNTER: Primary | ICD-10-CM

## 2025-07-31 DIAGNOSIS — S70.361A TICK BITE OF RIGHT THIGH, INITIAL ENCOUNTER: Primary | ICD-10-CM

## 2025-07-31 DIAGNOSIS — R53.83 FATIGUE, UNSPECIFIED TYPE: ICD-10-CM
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
## 2025-07-31 DIAGNOSIS — L08.9 INFECTED INSECT BITE OF RIGHT THIGH, INITIAL ENCOUNTER: Primary | ICD-10-CM

## 2025-07-31 LAB
ALBUMIN SERPL-MCNC: 4.3 G/DL (ref 3.5–5.2)
ALBUMIN/GLOB SERPL: 1.3 G/DL
ALP SERPL-CCNC: 71 U/L (ref 39–117)
ALT SERPL W P-5'-P-CCNC: 22 U/L (ref 1–41)
ANION GAP SERPL CALCULATED.3IONS-SCNC: 11.3 MMOL/L (ref 5–15)
AST SERPL-CCNC: 22 U/L (ref 1–40)
BASOPHILS # BLD AUTO: 0.03 10*3/MM3 (ref 0–0.2)
BASOPHILS NFR BLD AUTO: 0.2 % (ref 0–1.5)
BILIRUB SERPL-MCNC: 0.8 MG/DL (ref 0–1.2)
BUN SERPL-MCNC: 14 MG/DL (ref 8–23)
BUN/CREAT SERPL: 9.9 (ref 7–25)
CALCIUM SPEC-SCNC: 9.3 MG/DL (ref 8.6–10.5)
CHLORIDE SERPL-SCNC: 101 MMOL/L (ref 98–107)
CO2 SERPL-SCNC: 24.7 MMOL/L (ref 22–29)
CREAT SERPL-MCNC: 1.41 MG/DL (ref 0.76–1.27)
DEPRECATED RDW RBC AUTO: 42.9 FL (ref 37–54)
EGFRCR SERPLBLD CKD-EPI 2021: 51.3 ML/MIN/1.73
EOSINOPHIL # BLD AUTO: 0.01 10*3/MM3 (ref 0–0.4)
EOSINOPHIL NFR BLD AUTO: 0.1 % (ref 0.3–6.2)
ERYTHROCYTE [DISTWIDTH] IN BLOOD BY AUTOMATED COUNT: 12.7 % (ref 12.3–15.4)
GLOBULIN UR ELPH-MCNC: 3.3 GM/DL
GLUCOSE SERPL-MCNC: 118 MG/DL (ref 65–99)
HCT VFR BLD AUTO: 46.5 % (ref 37.5–51)
HGB BLD-MCNC: 15.6 G/DL (ref 13–17.7)
IMM GRANULOCYTES # BLD AUTO: 0.05 10*3/MM3 (ref 0–0.05)
IMM GRANULOCYTES NFR BLD AUTO: 0.4 % (ref 0–0.5)
LYMPHOCYTES # BLD AUTO: 0.88 10*3/MM3 (ref 0.7–3.1)
LYMPHOCYTES NFR BLD AUTO: 7.1 % (ref 19.6–45.3)
MCH RBC QN AUTO: 31.2 PG (ref 26.6–33)
MCHC RBC AUTO-ENTMCNC: 33.5 G/DL (ref 31.5–35.7)
MCV RBC AUTO: 93 FL (ref 79–97)
MONOCYTES # BLD AUTO: 1.34 10*3/MM3 (ref 0.1–0.9)
MONOCYTES NFR BLD AUTO: 10.7 % (ref 5–12)
NEUTROPHILS NFR BLD AUTO: 10.17 10*3/MM3 (ref 1.7–7)
NEUTROPHILS NFR BLD AUTO: 81.5 % (ref 42.7–76)
NRBC BLD AUTO-RTO: 0 /100 WBC (ref 0–0.2)
PLATELET # BLD AUTO: 241 10*3/MM3 (ref 140–450)
PMV BLD AUTO: 10.1 FL (ref 6–12)
POTASSIUM SERPL-SCNC: 4.4 MMOL/L (ref 3.5–5.2)
PROT SERPL-MCNC: 7.6 G/DL (ref 6–8.5)
RBC # BLD AUTO: 5 10*6/MM3 (ref 4.14–5.8)
SODIUM SERPL-SCNC: 137 MMOL/L (ref 136–145)
TSH SERPL DL<=0.05 MIU/L-ACNC: 0.44 UIU/ML (ref 0.27–4.2)
WBC NRBC COR # BLD AUTO: 12.48 10*3/MM3 (ref 3.4–10.8)

## 2025-07-31 PROCEDURE — 36415 COLL VENOUS BLD VENIPUNCTURE: CPT

## 2025-07-31 PROCEDURE — 80053 COMPREHEN METABOLIC PANEL: CPT

## 2025-07-31 PROCEDURE — 86753 PROTOZOA ANTIBODY NOS: CPT

## 2025-07-31 PROCEDURE — 86618 LYME DISEASE ANTIBODY: CPT

## 2025-07-31 PROCEDURE — 86757 RICKETTSIA ANTIBODY: CPT

## 2025-07-31 PROCEDURE — 85025 COMPLETE CBC W/AUTO DIFF WBC: CPT

## 2025-07-31 PROCEDURE — 87484 EHRLICHA CHAFFEENSIS AMP PRB: CPT

## 2025-07-31 PROCEDURE — 87468 ANAPLSMA PHGCYTOPHLM AMP PRB: CPT

## 2025-07-31 PROCEDURE — 84443 ASSAY THYROID STIM HORMONE: CPT

## 2025-08-01 NOTE — PROGRESS NOTES
"  Subjective   Perez Bolaños is a 75 y.o. male who is here for   Chief Complaint   Patient presents with    Fatigue    brain fog    Fever    bitten by Tic   .     Fatigue  Symptoms: fatigue and fever    Symptoms: no chest pain, no cough and no rash    Fever   Pertinent negatives include no chest pain, coughing or rash.      History of Present Illness  Perez Bolaños dents to the office for evaluation of fatigue, brain fog, and recent bite by a tick.    Patient states this has been going on for the past week.  Patient states he has not changed any of his medications.  Patient states he did take a Tylenol PM recently to help with him to get rest.  He has been working outside over the past several days working on a fence.  Patient is unsure if this is related to possible tick bite or related to dehydration    Review of Systems   Constitutional:  Positive for fatigue and fever.   Respiratory:  Negative for cough and shortness of breath.    Cardiovascular:  Negative for chest pain and leg swelling.   Skin:  Negative for color change and rash.   Psychiatric/Behavioral:  Positive for decreased concentration.        Objective   Vitals:    07/31/25 1604   BP: 154/78   BP Location: Left arm   Patient Position: Sitting   Cuff Size: Adult   Pulse: 97   Temp: 99.7 °F (37.6 °C)   SpO2: 94%   Weight: 80.7 kg (178 lb)   Height: 175.3 cm (69.02\")      Physical Exam  Vitals and nursing note reviewed.   Constitutional:       Appearance: Normal appearance. He is normal weight.   HENT:      Head: Normocephalic and atraumatic.   Cardiovascular:      Rate and Rhythm: Normal rate and regular rhythm.      Pulses: Normal pulses.      Heart sounds: No murmur heard.  Pulmonary:      Effort: Pulmonary effort is normal. No respiratory distress.      Breath sounds: Normal breath sounds. No wheezing.   Skin:     General: Skin is warm and dry.      Findings: No rash.   Neurological:      General: No focal deficit present.      Mental Status: He " is alert.   Psychiatric:         Mood and Affect: Mood normal.         Thought Content: Thought content normal.       Physical Exam        Assessment & Plan   Assessment & Plan    Diagnoses and all orders for this visit:    1. Tick bite of right thigh, initial encounter (Primary)  Unclear etiology.  Will obtain baseline labs.  We will evaluate for possible tickborne illness.  -     TSH Rfx On Abnormal To Free T4  -     CBC & Differential  -     Comprehensive Metabolic Panel  -     Ehrlichia sp., PCR  -     Babesia duncani IgG, WA1 Ab  -     Lyme Disease Total Antibody With Reflex to Immunoassay  -     Spotted Fever Group AB, IgG/IgM    2. Fatigue, unspecified type  Unclear etiology.  It is likely related to dehydration however.  Will obtain baseline labs.  Encouraged increased hydration.  Will monitor for improvement.  -     TSH Rfx On Abnormal To Free T4  -     CBC & Differential  -     Comprehensive Metabolic Panel  -     Ehrlichia sp., PCR  -     Babesia duncani IgG, WA1 Ab  -     Lyme Disease Total Antibody With Reflex to Immunoassay  -     Spotted Fever Group AB, IgG/IgM    3. Brain fog  -     TSH Rfx On Abnormal To Free T4  -     CBC & Differential  -     Comprehensive Metabolic Panel  -     Ehrlichia sp., PCR  -     Babesia duncani IgG, WA1 Ab  -     Lyme Disease Total Antibody With Reflex to Immunoassay  -     Spotted Fever Group AB, IgG/IgM      Results      There are no Patient Instructions on file for this visit.    Medications Discontinued During This Encounter   Medication Reason    sildenafil (REVATIO) 20 MG tablet Patient Reported Not Taking    ZINC CITRATE PO     clomiPHENE (CLOMID) 50 MG tablet     Cholecalciferol (Vitamin D3) 50 MCG (2000 UT) capsule         Return in about 12 days (around 8/12/2025), or if symptoms worsen or fail to improve, for Recheck.           Laureano Palafox MD  [place], [place]

## 2025-08-02 LAB — B BURGDOR IGG+IGM SER QL IA: NEGATIVE

## 2025-08-03 ENCOUNTER — HOSPITAL ENCOUNTER (INPATIENT)
Facility: HOSPITAL | Age: 75
LOS: 2 days | Discharge: HOME | End: 2025-08-06
Attending: EMERGENCY MEDICINE | Admitting: HOSPITALIST
Payer: MEDICARE

## 2025-08-03 DIAGNOSIS — E87.1 HYPONATREMIA: Primary | ICD-10-CM

## 2025-08-03 DIAGNOSIS — R31.9 HEMATURIA, UNSPECIFIED TYPE: ICD-10-CM

## 2025-08-03 DIAGNOSIS — R50.9 FEVER, UNSPECIFIED FEVER CAUSE: ICD-10-CM

## 2025-08-03 DIAGNOSIS — R53.1 WEAKNESS: ICD-10-CM

## 2025-08-03 LAB
ALBUMIN SERPL-MCNC: 3.5 G/DL (ref 3.5–5.2)
ALBUMIN/GLOB SERPL: 1.1 G/DL
ALP SERPL-CCNC: 58 U/L (ref 39–117)
ALT SERPL W P-5'-P-CCNC: 86 U/L (ref 1–41)
ANION GAP SERPL CALCULATED.3IONS-SCNC: 13.6 MMOL/L (ref 5–15)
ANION GAP SERPL CALCULATED.3IONS-SCNC: 14.1 MMOL/L (ref 5–15)
AST SERPL-CCNC: 93 U/L (ref 1–40)
B PARAPERT DNA SPEC QL NAA+PROBE: NOT DETECTED
B PERT DNA SPEC QL NAA+PROBE: NOT DETECTED
BACTERIA UR QL AUTO: ABNORMAL /HPF
BASOPHILS # BLD AUTO: 0 10*3/MM3 (ref 0–0.2)
BASOPHILS # BLD AUTO: 0.02 10*3/MM3 (ref 0–0.2)
BASOPHILS NFR BLD AUTO: 0 % (ref 0–1.5)
BASOPHILS NFR BLD AUTO: 0.2 % (ref 0–1.5)
BILIRUB SERPL-MCNC: 0.6 MG/DL (ref 0–1.2)
BILIRUB UR QL STRIP: NEGATIVE
BUN SERPL-MCNC: 17.6 MG/DL (ref 8–23)
BUN SERPL-MCNC: 17.7 MG/DL (ref 8–23)
BUN/CREAT SERPL: 12.1 (ref 7–25)
BUN/CREAT SERPL: 14.9 (ref 7–25)
C PNEUM DNA NPH QL NAA+NON-PROBE: NOT DETECTED
CALCIUM SPEC-SCNC: 8.2 MG/DL (ref 8.6–10.5)
CALCIUM SPEC-SCNC: 8.9 MG/DL (ref 8.6–10.5)
CHLORIDE SERPL-SCNC: 92 MMOL/L (ref 98–107)
CHLORIDE SERPL-SCNC: 95 MMOL/L (ref 98–107)
CK SERPL-CCNC: 71 U/L (ref 20–200)
CLARITY UR: CLEAR
CO2 SERPL-SCNC: 20.4 MMOL/L (ref 22–29)
CO2 SERPL-SCNC: 20.9 MMOL/L (ref 22–29)
COLOR UR: ABNORMAL
CREAT SERPL-MCNC: 1.19 MG/DL (ref 0.76–1.27)
CREAT SERPL-MCNC: 1.46 MG/DL (ref 0.76–1.27)
DEPRECATED RDW RBC AUTO: 40.4 FL (ref 37–54)
DEPRECATED RDW RBC AUTO: 41.9 FL (ref 37–54)
EGFRCR SERPLBLD CKD-EPI 2021: 49.2 ML/MIN/1.73
EGFRCR SERPLBLD CKD-EPI 2021: 62.9 ML/MIN/1.73
EOSINOPHIL # BLD AUTO: 0 10*3/MM3 (ref 0–0.4)
EOSINOPHIL # BLD AUTO: 0 10*3/MM3 (ref 0–0.4)
EOSINOPHIL NFR BLD AUTO: 0 % (ref 0.3–6.2)
EOSINOPHIL NFR BLD AUTO: 0 % (ref 0.3–6.2)
ERYTHROCYTE [DISTWIDTH] IN BLOOD BY AUTOMATED COUNT: 12.4 % (ref 12.3–15.4)
ERYTHROCYTE [DISTWIDTH] IN BLOOD BY AUTOMATED COUNT: 12.7 % (ref 12.3–15.4)
ETHANOL BLD-MCNC: <10 MG/DL (ref 0–10)
ETHANOL UR QL: <0.01 %
FLUAV RNA RESP QL NAA+PROBE: NOT DETECTED
FLUAV SUBTYP SPEC NAA+PROBE: NOT DETECTED
FLUBV RNA NPH QL NAA+NON-PROBE: NOT DETECTED
FLUBV RNA RESP QL NAA+PROBE: NOT DETECTED
GLOBULIN UR ELPH-MCNC: 3.2 GM/DL
GLUCOSE SERPL-MCNC: 115 MG/DL (ref 65–99)
GLUCOSE SERPL-MCNC: 190 MG/DL (ref 65–99)
GLUCOSE UR STRIP-MCNC: NEGATIVE MG/DL
HADV DNA SPEC NAA+PROBE: NOT DETECTED
HCOV 229E RNA SPEC QL NAA+PROBE: NOT DETECTED
HCOV HKU1 RNA SPEC QL NAA+PROBE: NOT DETECTED
HCOV NL63 RNA SPEC QL NAA+PROBE: NOT DETECTED
HCOV OC43 RNA SPEC QL NAA+PROBE: NOT DETECTED
HCT VFR BLD AUTO: 37.1 % (ref 37.5–51)
HCT VFR BLD AUTO: 41.1 % (ref 37.5–51)
HGB BLD-MCNC: 13.2 G/DL (ref 13–17.7)
HGB BLD-MCNC: 14 G/DL (ref 13–17.7)
HGB UR QL STRIP.AUTO: ABNORMAL
HMPV RNA NPH QL NAA+NON-PROBE: NOT DETECTED
HPIV1 RNA ISLT QL NAA+PROBE: NOT DETECTED
HPIV2 RNA SPEC QL NAA+PROBE: NOT DETECTED
HPIV3 RNA NPH QL NAA+PROBE: NOT DETECTED
HPIV4 P GENE NPH QL NAA+PROBE: NOT DETECTED
HYALINE CASTS UR QL AUTO: ABNORMAL /LPF
IMM GRANULOCYTES # BLD AUTO: 0.02 10*3/MM3 (ref 0–0.05)
IMM GRANULOCYTES # BLD AUTO: 0.04 10*3/MM3 (ref 0–0.05)
IMM GRANULOCYTES NFR BLD AUTO: 0.2 % (ref 0–0.5)
IMM GRANULOCYTES NFR BLD AUTO: 0.4 % (ref 0–0.5)
KETONES UR QL STRIP: NEGATIVE
LEUKOCYTE ESTERASE UR QL STRIP.AUTO: NEGATIVE
LYMPHOCYTES # BLD AUTO: 0.32 10*3/MM3 (ref 0.7–3.1)
LYMPHOCYTES # BLD AUTO: 0.48 10*3/MM3 (ref 0.7–3.1)
LYMPHOCYTES NFR BLD AUTO: 3.3 % (ref 19.6–45.3)
LYMPHOCYTES NFR BLD AUTO: 5 % (ref 19.6–45.3)
M PNEUMO IGG SER IA-ACNC: NOT DETECTED
MAGNESIUM SERPL-MCNC: 2 MG/DL (ref 1.6–2.4)
MCH RBC QN AUTO: 31 PG (ref 26.6–33)
MCH RBC QN AUTO: 31.1 PG (ref 26.6–33)
MCHC RBC AUTO-ENTMCNC: 34.1 G/DL (ref 31.5–35.7)
MCHC RBC AUTO-ENTMCNC: 35.6 G/DL (ref 31.5–35.7)
MCV RBC AUTO: 87.3 FL (ref 79–97)
MCV RBC AUTO: 90.9 FL (ref 79–97)
MONOCYTES # BLD AUTO: 0.85 10*3/MM3 (ref 0.1–0.9)
MONOCYTES # BLD AUTO: 0.89 10*3/MM3 (ref 0.1–0.9)
MONOCYTES NFR BLD AUTO: 8.7 % (ref 5–12)
MONOCYTES NFR BLD AUTO: 9.3 % (ref 5–12)
NEUTROPHILS NFR BLD AUTO: 8.17 10*3/MM3 (ref 1.7–7)
NEUTROPHILS NFR BLD AUTO: 8.56 10*3/MM3 (ref 1.7–7)
NEUTROPHILS NFR BLD AUTO: 85.1 % (ref 42.7–76)
NEUTROPHILS NFR BLD AUTO: 87.8 % (ref 42.7–76)
NITRITE UR QL STRIP: NEGATIVE
NRBC BLD AUTO-RTO: 0 /100 WBC (ref 0–0.2)
PH UR STRIP.AUTO: 5.5 [PH] (ref 4.5–8)
PHOSPHATE SERPL-MCNC: 2.1 MG/DL (ref 2.5–4.5)
PLATELET # BLD AUTO: 142 10*3/MM3 (ref 140–450)
PLATELET # BLD AUTO: 169 10*3/MM3 (ref 140–450)
PMV BLD AUTO: 9.4 FL (ref 6–12)
PMV BLD AUTO: 9.5 FL (ref 6–12)
POTASSIUM SERPL-SCNC: 3.8 MMOL/L (ref 3.5–5.2)
POTASSIUM SERPL-SCNC: 4.4 MMOL/L (ref 3.5–5.2)
PROCALCITONIN SERPL-MCNC: 0.85 NG/ML (ref 0–0.25)
PROCALCITONIN SERPL-MCNC: 0.86 NG/ML (ref 0–0.25)
PROT SERPL-MCNC: 6.7 G/DL (ref 6–8.5)
PROT UR QL STRIP: ABNORMAL
RBC # BLD AUTO: 4.25 10*6/MM3 (ref 4.14–5.8)
RBC # BLD AUTO: 4.52 10*6/MM3 (ref 4.14–5.8)
RBC # UR STRIP: ABNORMAL /HPF
REF LAB TEST METHOD: ABNORMAL
RHINOVIRUS RNA SPEC NAA+PROBE: NOT DETECTED
RSV RNA NPH QL NAA+NON-PROBE: NOT DETECTED
SARS-COV-2 RNA NPH QL NAA+NON-PROBE: NOT DETECTED
SARS-COV-2 RNA RESP QL NAA+PROBE: NOT DETECTED
SODIUM SERPL-SCNC: 127 MMOL/L (ref 136–145)
SODIUM SERPL-SCNC: 129 MMOL/L (ref 136–145)
SP GR UR STRIP: 1.03 (ref 1–1.03)
SQUAMOUS #/AREA URNS HPF: ABNORMAL /HPF
UROBILINOGEN UR QL STRIP: ABNORMAL
WBC # UR STRIP: ABNORMAL /HPF
WBC NRBC COR # BLD AUTO: 9.6 10*3/MM3 (ref 3.4–10.8)
WBC NRBC COR # BLD AUTO: 9.75 10*3/MM3 (ref 3.4–10.8)

## 2025-08-03 PROCEDURE — 85025 COMPLETE CBC W/AUTO DIFF WBC: CPT | Performed by: FAMILY MEDICINE

## 2025-08-03 PROCEDURE — 80053 COMPREHEN METABOLIC PANEL: CPT | Performed by: EMERGENCY MEDICINE

## 2025-08-03 PROCEDURE — 85025 COMPLETE CBC W/AUTO DIFF WBC: CPT | Performed by: EMERGENCY MEDICINE

## 2025-08-03 PROCEDURE — 0202U NFCT DS 22 TRGT SARS-COV-2: CPT | Performed by: HOSPITALIST

## 2025-08-03 PROCEDURE — 99285 EMERGENCY DEPT VISIT HI MDM: CPT | Performed by: EMERGENCY MEDICINE

## 2025-08-03 PROCEDURE — 99222 1ST HOSP IP/OBS MODERATE 55: CPT | Performed by: HOSPITALIST

## 2025-08-03 PROCEDURE — 87636 SARSCOV2 & INF A&B AMP PRB: CPT | Performed by: EMERGENCY MEDICINE

## 2025-08-03 PROCEDURE — 84145 PROCALCITONIN (PCT): CPT | Performed by: HOSPITALIST

## 2025-08-03 PROCEDURE — 36415 COLL VENOUS BLD VENIPUNCTURE: CPT | Performed by: FAMILY MEDICINE

## 2025-08-03 PROCEDURE — 83735 ASSAY OF MAGNESIUM: CPT | Performed by: EMERGENCY MEDICINE

## 2025-08-03 PROCEDURE — G0378 HOSPITAL OBSERVATION PER HR: HCPCS

## 2025-08-03 PROCEDURE — 84100 ASSAY OF PHOSPHORUS: CPT | Performed by: EMERGENCY MEDICINE

## 2025-08-03 PROCEDURE — 25010000002 CEFTRIAXONE PER 250 MG: Performed by: HOSPITALIST

## 2025-08-03 PROCEDURE — 25810000003 LACTATED RINGERS SOLUTION: Performed by: EMERGENCY MEDICINE

## 2025-08-03 PROCEDURE — 87449 NOS EACH ORGANISM AG IA: CPT | Performed by: HOSPITALIST

## 2025-08-03 PROCEDURE — 36415 COLL VENOUS BLD VENIPUNCTURE: CPT

## 2025-08-03 PROCEDURE — 82550 ASSAY OF CK (CPK): CPT | Performed by: HOSPITALIST

## 2025-08-03 PROCEDURE — 87040 BLOOD CULTURE FOR BACTERIA: CPT | Performed by: HOSPITALIST

## 2025-08-03 PROCEDURE — 82077 ASSAY SPEC XCP UR&BREATH IA: CPT | Performed by: HOSPITALIST

## 2025-08-03 PROCEDURE — 25010000002 DOXYCYCLINE 100 MG RECONSTITUTED SOLUTION 1 EACH VIAL: Performed by: HOSPITALIST

## 2025-08-03 PROCEDURE — 25810000003 SODIUM CHLORIDE 0.9 % SOLUTION: Performed by: HOSPITALIST

## 2025-08-03 PROCEDURE — 81001 URINALYSIS AUTO W/SCOPE: CPT | Performed by: EMERGENCY MEDICINE

## 2025-08-03 RX ORDER — L.ACID,PARA/B.BIFIDUM/S.THERM 8B CELL
1 CAPSULE ORAL EVERY MORNING
Status: DISCONTINUED | OUTPATIENT
Start: 2025-08-04 | End: 2025-08-06 | Stop reason: HOSPADM

## 2025-08-03 RX ORDER — ACETAMINOPHEN 325 MG/1
650 TABLET ORAL EVERY 6 HOURS PRN
Status: DISCONTINUED | OUTPATIENT
Start: 2025-08-03 | End: 2025-08-06 | Stop reason: HOSPADM

## 2025-08-03 RX ORDER — SODIUM CHLORIDE 9 MG/ML
100 INJECTION, SOLUTION INTRAVENOUS CONTINUOUS
Status: DISCONTINUED | OUTPATIENT
Start: 2025-08-03 | End: 2025-08-05

## 2025-08-03 RX ORDER — SODIUM CHLORIDE 0.9 % (FLUSH) 0.9 %
10 SYRINGE (ML) INJECTION AS NEEDED
Status: DISCONTINUED | OUTPATIENT
Start: 2025-08-03 | End: 2025-08-06 | Stop reason: HOSPADM

## 2025-08-03 RX ORDER — PANTOPRAZOLE SODIUM 40 MG/1
40 TABLET, DELAYED RELEASE ORAL DAILY
Status: DISCONTINUED | OUTPATIENT
Start: 2025-08-03 | End: 2025-08-06 | Stop reason: HOSPADM

## 2025-08-03 RX ORDER — CETIRIZINE HYDROCHLORIDE 10 MG/1
10 TABLET ORAL DAILY
Status: DISCONTINUED | OUTPATIENT
Start: 2025-08-03 | End: 2025-08-06 | Stop reason: HOSPADM

## 2025-08-03 RX ADMIN — DOXYCYCLINE 100 MG: 100 INJECTION, POWDER, LYOPHILIZED, FOR SOLUTION INTRAVENOUS at 17:29

## 2025-08-03 RX ADMIN — SODIUM CHLORIDE 2000 MG: 9 INJECTION, SOLUTION INTRAVENOUS at 15:49

## 2025-08-03 RX ADMIN — PANTOPRAZOLE SODIUM 40 MG: 40 TABLET, DELAYED RELEASE ORAL at 17:28

## 2025-08-03 RX ADMIN — ACETAMINOPHEN 650 MG: 325 TABLET, FILM COATED ORAL at 15:12

## 2025-08-03 RX ADMIN — SODIUM CHLORIDE, POTASSIUM CHLORIDE, SODIUM LACTATE AND CALCIUM CHLORIDE 1000 ML: 600; 310; 30; 20 INJECTION, SOLUTION INTRAVENOUS at 11:58

## 2025-08-03 RX ADMIN — CETIRIZINE HYDROCHLORIDE 10 MG: 10 TABLET, FILM COATED ORAL at 17:29

## 2025-08-03 RX ADMIN — SODIUM CHLORIDE 100 ML/HR: 9 INJECTION, SOLUTION INTRAVENOUS at 15:46

## 2025-08-04 ENCOUNTER — APPOINTMENT (OUTPATIENT)
Dept: ULTRASOUND IMAGING | Facility: HOSPITAL | Age: 75
End: 2025-08-04
Payer: MEDICARE

## 2025-08-04 ENCOUNTER — APPOINTMENT (OUTPATIENT)
Dept: GENERAL RADIOLOGY | Facility: HOSPITAL | Age: 75
End: 2025-08-04
Payer: MEDICARE

## 2025-08-04 PROBLEM — J18.9 COMMUNITY ACQUIRED PNEUMONIA: Status: ACTIVE | Noted: 2025-08-04

## 2025-08-04 LAB
A PHAGOCYTOPH DNA BLD QL NAA+PROBE: NEGATIVE
ALBUMIN SERPL-MCNC: 3.1 G/DL (ref 3.5–5.2)
ALBUMIN/GLOB SERPL: 1 G/DL
ALP SERPL-CCNC: 69 U/L (ref 39–117)
ALT SERPL W P-5'-P-CCNC: 161 U/L (ref 1–41)
ANION GAP SERPL CALCULATED.3IONS-SCNC: 12.8 MMOL/L (ref 5–15)
AST SERPL-CCNC: 194 U/L (ref 1–40)
BASOPHILS # BLD AUTO: 0.01 10*3/MM3 (ref 0–0.2)
BASOPHILS NFR BLD AUTO: 0.1 % (ref 0–1.5)
BILIRUB SERPL-MCNC: 0.5 MG/DL (ref 0–1.2)
BUN SERPL-MCNC: 18.3 MG/DL (ref 8–23)
BUN/CREAT SERPL: 15 (ref 7–25)
CALCIUM SPEC-SCNC: 8.3 MG/DL (ref 8.6–10.5)
CHLORIDE SERPL-SCNC: 96 MMOL/L (ref 98–107)
CO2 SERPL-SCNC: 21.2 MMOL/L (ref 22–29)
CREAT SERPL-MCNC: 1.22 MG/DL (ref 0.76–1.27)
DEPRECATED RDW RBC AUTO: 42.4 FL (ref 37–54)
EGFRCR SERPLBLD CKD-EPI 2021: 61.1 ML/MIN/1.73
EHRLICHIA DNA SPEC QL NAA+PROBE: NEGATIVE
EOSINOPHIL # BLD AUTO: 0 10*3/MM3 (ref 0–0.4)
EOSINOPHIL NFR BLD AUTO: 0 % (ref 0.3–6.2)
ERYTHROCYTE [DISTWIDTH] IN BLOOD BY AUTOMATED COUNT: 13 % (ref 12.3–15.4)
GLOBULIN UR ELPH-MCNC: 3.2 GM/DL
GLUCOSE SERPL-MCNC: 143 MG/DL (ref 65–99)
HCT VFR BLD AUTO: 38.1 % (ref 37.5–51)
HGB BLD-MCNC: 13 G/DL (ref 13–17.7)
IMM GRANULOCYTES # BLD AUTO: 0.05 10*3/MM3 (ref 0–0.05)
IMM GRANULOCYTES NFR BLD AUTO: 0.6 % (ref 0–0.5)
INR PPP: 1.16 (ref 0.9–1.1)
L PNEUMO1 AG UR QL IA: NEGATIVE
LYMPHOCYTES # BLD AUTO: 0.58 10*3/MM3 (ref 0.7–3.1)
LYMPHOCYTES NFR BLD AUTO: 6.9 % (ref 19.6–45.3)
MCH RBC QN AUTO: 30.7 PG (ref 26.6–33)
MCHC RBC AUTO-ENTMCNC: 34.1 G/DL (ref 31.5–35.7)
MCV RBC AUTO: 90.1 FL (ref 79–97)
MONOCYTES # BLD AUTO: 0.65 10*3/MM3 (ref 0.1–0.9)
MONOCYTES NFR BLD AUTO: 7.7 % (ref 5–12)
NEUTROPHILS NFR BLD AUTO: 7.13 10*3/MM3 (ref 1.7–7)
NEUTROPHILS NFR BLD AUTO: 84.7 % (ref 42.7–76)
NRBC BLD AUTO-RTO: 0 /100 WBC (ref 0–0.2)
PLATELET # BLD AUTO: 155 10*3/MM3 (ref 140–450)
PMV BLD AUTO: 10.3 FL (ref 6–12)
POTASSIUM SERPL-SCNC: 3.9 MMOL/L (ref 3.5–5.2)
PROCALCITONIN SERPL-MCNC: 1.28 NG/ML (ref 0–0.25)
PROT SERPL-MCNC: 6.3 G/DL (ref 6–8.5)
PROTHROMBIN TIME: 15.1 SECONDS (ref 12.1–15)
RBC # BLD AUTO: 4.23 10*6/MM3 (ref 4.14–5.8)
RESULT COMMENT:: NORMAL
RICK SF IGG TITR SER: NORMAL {TITER}
RICK SF IGM TITR SER: NORMAL {TITER}
S PNEUM AG SPEC QL LA: NEGATIVE
SODIUM SERPL-SCNC: 130 MMOL/L (ref 136–145)
WBC NRBC COR # BLD AUTO: 8.42 10*3/MM3 (ref 3.4–10.8)

## 2025-08-04 PROCEDURE — 80053 COMPREHEN METABOLIC PANEL: CPT | Performed by: HOSPITALIST

## 2025-08-04 PROCEDURE — 25810000003 SODIUM CHLORIDE 0.9 % SOLUTION: Performed by: HOSPITALIST

## 2025-08-04 PROCEDURE — 25010000002 DOXYCYCLINE 100 MG RECONSTITUTED SOLUTION 1 EACH VIAL: Performed by: HOSPITALIST

## 2025-08-04 PROCEDURE — 71046 X-RAY EXAM CHEST 2 VIEWS: CPT

## 2025-08-04 PROCEDURE — 99232 SBSQ HOSP IP/OBS MODERATE 35: CPT | Performed by: HOSPITALIST

## 2025-08-04 PROCEDURE — 25810000003 SODIUM CHLORIDE 0.9 % SOLUTION 250 ML FLEX CONT: Performed by: HOSPITALIST

## 2025-08-04 PROCEDURE — 76705 ECHO EXAM OF ABDOMEN: CPT

## 2025-08-04 PROCEDURE — 85025 COMPLETE CBC W/AUTO DIFF WBC: CPT | Performed by: HOSPITALIST

## 2025-08-04 PROCEDURE — 25010000002 CEFTRIAXONE PER 250 MG: Performed by: HOSPITALIST

## 2025-08-04 PROCEDURE — 25010000002 ENOXAPARIN PER 10 MG: Performed by: HOSPITALIST

## 2025-08-04 PROCEDURE — 94799 UNLISTED PULMONARY SVC/PX: CPT

## 2025-08-04 PROCEDURE — 80074 ACUTE HEPATITIS PANEL: CPT | Performed by: HOSPITALIST

## 2025-08-04 PROCEDURE — 84145 PROCALCITONIN (PCT): CPT | Performed by: HOSPITALIST

## 2025-08-04 PROCEDURE — 85610 PROTHROMBIN TIME: CPT | Performed by: HOSPITALIST

## 2025-08-04 PROCEDURE — 25010000002 AZITHROMYCIN PER 500 MG: Performed by: HOSPITALIST

## 2025-08-04 RX ORDER — ASCORBIC ACID 500 MG
1000 TABLET ORAL 2 TIMES DAILY
Status: DISCONTINUED | OUTPATIENT
Start: 2025-08-04 | End: 2025-08-06 | Stop reason: HOSPADM

## 2025-08-04 RX ORDER — ENOXAPARIN SODIUM 100 MG/ML
40 INJECTION SUBCUTANEOUS NIGHTLY
Status: DISCONTINUED | OUTPATIENT
Start: 2025-08-04 | End: 2025-08-06 | Stop reason: HOSPADM

## 2025-08-04 RX ADMIN — DOXYCYCLINE 100 MG: 100 INJECTION, POWDER, LYOPHILIZED, FOR SOLUTION INTRAVENOUS at 06:00

## 2025-08-04 RX ADMIN — PANTOPRAZOLE SODIUM 40 MG: 40 TABLET, DELAYED RELEASE ORAL at 08:17

## 2025-08-04 RX ADMIN — ACETAMINOPHEN 650 MG: 325 TABLET, FILM COATED ORAL at 00:47

## 2025-08-04 RX ADMIN — ACETAMINOPHEN 650 MG: 325 TABLET, FILM COATED ORAL at 09:08

## 2025-08-04 RX ADMIN — OXYCODONE HYDROCHLORIDE AND ACETAMINOPHEN 1000 MG: 500 TABLET ORAL at 21:10

## 2025-08-04 RX ADMIN — ACETAMINOPHEN 650 MG: 325 TABLET, FILM COATED ORAL at 22:52

## 2025-08-04 RX ADMIN — CETIRIZINE HYDROCHLORIDE 10 MG: 10 TABLET, FILM COATED ORAL at 08:17

## 2025-08-04 RX ADMIN — SODIUM CHLORIDE 2000 MG: 9 INJECTION, SOLUTION INTRAVENOUS at 15:32

## 2025-08-04 RX ADMIN — SODIUM CHLORIDE 100 ML/HR: 9 INJECTION, SOLUTION INTRAVENOUS at 01:42

## 2025-08-04 RX ADMIN — Medication 1 CAPSULE: at 06:05

## 2025-08-04 RX ADMIN — AZITHROMYCIN DIHYDRATE 500 MG: 500 INJECTION, POWDER, LYOPHILIZED, FOR SOLUTION INTRAVENOUS at 10:33

## 2025-08-04 RX ADMIN — ENOXAPARIN SODIUM 40 MG: 100 INJECTION SUBCUTANEOUS at 21:10

## 2025-08-04 RX ADMIN — ACETAMINOPHEN 650 MG: 325 TABLET, FILM COATED ORAL at 15:41

## 2025-08-05 LAB
ALBUMIN SERPL-MCNC: 2.9 G/DL (ref 3.5–5.2)
ALBUMIN/GLOB SERPL: 1.1 G/DL
ALP SERPL-CCNC: 77 U/L (ref 39–117)
ALT SERPL W P-5'-P-CCNC: 250 U/L (ref 1–41)
ANION GAP SERPL CALCULATED.3IONS-SCNC: 10.3 MMOL/L (ref 5–15)
AST SERPL-CCNC: 330 U/L (ref 1–40)
BACTERIA UR QL AUTO: NORMAL /HPF
BASOPHILS # BLD AUTO: 0.01 10*3/MM3 (ref 0–0.2)
BASOPHILS NFR BLD AUTO: 0.2 % (ref 0–1.5)
BILIRUB SERPL-MCNC: 0.5 MG/DL (ref 0–1.2)
BILIRUB UR QL STRIP: NEGATIVE
BUN SERPL-MCNC: 16.8 MG/DL (ref 8–23)
BUN/CREAT SERPL: 15.3 (ref 7–25)
CALCIUM SPEC-SCNC: 8.1 MG/DL (ref 8.6–10.5)
CHLORIDE SERPL-SCNC: 100 MMOL/L (ref 98–107)
CLARITY UR: CLEAR
CO2 SERPL-SCNC: 20.7 MMOL/L (ref 22–29)
COLOR UR: YELLOW
CREAT SERPL-MCNC: 1.1 MG/DL (ref 0.76–1.27)
CREAT UR-MCNC: 111.3 MG/DL
DEPRECATED RDW RBC AUTO: 44 FL (ref 37–54)
EGFRCR SERPLBLD CKD-EPI 2021: 69.1 ML/MIN/1.73
EOSINOPHIL # BLD AUTO: 0 10*3/MM3 (ref 0–0.4)
EOSINOPHIL NFR BLD AUTO: 0 % (ref 0.3–6.2)
ERYTHROCYTE [DISTWIDTH] IN BLOOD BY AUTOMATED COUNT: 13.2 % (ref 12.3–15.4)
GLOBULIN UR ELPH-MCNC: 2.7 GM/DL
GLUCOSE SERPL-MCNC: 128 MG/DL (ref 65–99)
GLUCOSE UR STRIP-MCNC: NEGATIVE MG/DL
HAV IGM SERPL QL IA: NORMAL
HBV CORE IGM SERPL QL IA: NORMAL
HBV SURFACE AG SERPL QL IA: NORMAL
HCT VFR BLD AUTO: 36.8 % (ref 37.5–51)
HCV AB SER QL: NORMAL
HGB BLD-MCNC: 12.5 G/DL (ref 13–17.7)
HGB UR QL STRIP.AUTO: ABNORMAL
HYALINE CASTS UR QL AUTO: NORMAL /LPF
IMM GRANULOCYTES # BLD AUTO: 0.04 10*3/MM3 (ref 0–0.05)
IMM GRANULOCYTES NFR BLD AUTO: 0.7 % (ref 0–0.5)
INR PPP: 1.23 (ref 0.9–1.1)
KETONES UR QL STRIP: NEGATIVE
LEUKOCYTE ESTERASE UR QL STRIP.AUTO: NEGATIVE
LYMPHOCYTES # BLD AUTO: 0.42 10*3/MM3 (ref 0.7–3.1)
LYMPHOCYTES NFR BLD AUTO: 7.7 % (ref 19.6–45.3)
MCH RBC QN AUTO: 30.9 PG (ref 26.6–33)
MCHC RBC AUTO-ENTMCNC: 34 G/DL (ref 31.5–35.7)
MCV RBC AUTO: 91.1 FL (ref 79–97)
MONOCYTES # BLD AUTO: 0.59 10*3/MM3 (ref 0.1–0.9)
MONOCYTES NFR BLD AUTO: 10.7 % (ref 5–12)
NEUTROPHILS NFR BLD AUTO: 4.43 10*3/MM3 (ref 1.7–7)
NEUTROPHILS NFR BLD AUTO: 80.7 % (ref 42.7–76)
NITRITE UR QL STRIP: NEGATIVE
NRBC BLD AUTO-RTO: 0 /100 WBC (ref 0–0.2)
PH UR STRIP.AUTO: 6 [PH] (ref 4.5–8)
PLATELET # BLD AUTO: 149 10*3/MM3 (ref 140–450)
PMV BLD AUTO: 10.6 FL (ref 6–12)
POTASSIUM SERPL-SCNC: 3.8 MMOL/L (ref 3.5–5.2)
PROCALCITONIN SERPL-MCNC: 1.08 NG/ML (ref 0–0.25)
PROT ?TM UR-MCNC: 67.8 MG/DL
PROT SERPL-MCNC: 5.6 G/DL (ref 6–8.5)
PROT UR QL STRIP: ABNORMAL
PROT/CREAT UR: 609.2 MG/G CREA (ref 0–200)
PROTHROMBIN TIME: 15.9 SECONDS (ref 12.1–15)
RBC # BLD AUTO: 4.04 10*6/MM3 (ref 4.14–5.8)
RBC # UR STRIP: NORMAL /HPF
REF LAB TEST METHOD: NORMAL
SODIUM SERPL-SCNC: 131 MMOL/L (ref 136–145)
SP GR UR STRIP: 1.02 (ref 1–1.03)
SQUAMOUS #/AREA URNS HPF: NORMAL /HPF
UROBILINOGEN UR QL STRIP: ABNORMAL
WBC # UR STRIP: NORMAL /HPF
WBC NRBC COR # BLD AUTO: 5.49 10*3/MM3 (ref 3.4–10.8)

## 2025-08-05 PROCEDURE — 25810000003 SODIUM CHLORIDE 0.9 % SOLUTION: Performed by: HOSPITALIST

## 2025-08-05 PROCEDURE — 25010000002 AZITHROMYCIN PER 500 MG: Performed by: HOSPITALIST

## 2025-08-05 PROCEDURE — 25810000003 SODIUM CHLORIDE 0.9 % SOLUTION 250 ML FLEX CONT: Performed by: HOSPITALIST

## 2025-08-05 PROCEDURE — 85610 PROTHROMBIN TIME: CPT | Performed by: HOSPITALIST

## 2025-08-05 PROCEDURE — 25010000002 ENOXAPARIN PER 10 MG: Performed by: HOSPITALIST

## 2025-08-05 PROCEDURE — 87205 SMEAR GRAM STAIN: CPT | Performed by: HOSPITALIST

## 2025-08-05 PROCEDURE — 85025 COMPLETE CBC W/AUTO DIFF WBC: CPT | Performed by: HOSPITALIST

## 2025-08-05 PROCEDURE — 80053 COMPREHEN METABOLIC PANEL: CPT | Performed by: HOSPITALIST

## 2025-08-05 PROCEDURE — 99232 SBSQ HOSP IP/OBS MODERATE 35: CPT | Performed by: HOSPITALIST

## 2025-08-05 PROCEDURE — 87070 CULTURE OTHR SPECIMN AEROBIC: CPT | Performed by: HOSPITALIST

## 2025-08-05 PROCEDURE — 84145 PROCALCITONIN (PCT): CPT | Performed by: HOSPITALIST

## 2025-08-05 PROCEDURE — 84156 ASSAY OF PROTEIN URINE: CPT | Performed by: HOSPITALIST

## 2025-08-05 PROCEDURE — 82570 ASSAY OF URINE CREATININE: CPT | Performed by: HOSPITALIST

## 2025-08-05 PROCEDURE — 81001 URINALYSIS AUTO W/SCOPE: CPT | Performed by: HOSPITALIST

## 2025-08-05 RX ORDER — AZITHROMYCIN 250 MG/1
500 TABLET, FILM COATED ORAL ONCE
Status: COMPLETED | OUTPATIENT
Start: 2025-08-06 | End: 2025-08-06

## 2025-08-05 RX ORDER — CEFUROXIME AXETIL 250 MG/1
1000 TABLET ORAL 3 TIMES DAILY
Status: DISCONTINUED | OUTPATIENT
Start: 2025-08-05 | End: 2025-08-06 | Stop reason: HOSPADM

## 2025-08-05 RX ADMIN — CEFUROXIME AXETIL 1000 MG: 250 TABLET ORAL at 20:24

## 2025-08-05 RX ADMIN — SODIUM CHLORIDE 100 ML/HR: 9 INJECTION, SOLUTION INTRAVENOUS at 01:00

## 2025-08-05 RX ADMIN — OXYCODONE HYDROCHLORIDE AND ACETAMINOPHEN 1000 MG: 500 TABLET ORAL at 08:03

## 2025-08-05 RX ADMIN — ENOXAPARIN SODIUM 40 MG: 100 INJECTION SUBCUTANEOUS at 20:25

## 2025-08-05 RX ADMIN — OXYCODONE HYDROCHLORIDE AND ACETAMINOPHEN 1000 MG: 500 TABLET ORAL at 20:24

## 2025-08-05 RX ADMIN — Medication 1 CAPSULE: at 08:03

## 2025-08-05 RX ADMIN — CETIRIZINE HYDROCHLORIDE 10 MG: 10 TABLET, FILM COATED ORAL at 08:03

## 2025-08-05 RX ADMIN — CEFUROXIME AXETIL 1000 MG: 250 TABLET ORAL at 16:38

## 2025-08-05 RX ADMIN — PANTOPRAZOLE SODIUM 40 MG: 40 TABLET, DELAYED RELEASE ORAL at 08:02

## 2025-08-05 RX ADMIN — AZITHROMYCIN DIHYDRATE 500 MG: 500 INJECTION, POWDER, LYOPHILIZED, FOR SOLUTION INTRAVENOUS at 08:02

## 2025-08-06 ENCOUNTER — READMISSION MANAGEMENT (OUTPATIENT)
Dept: CALL CENTER | Facility: HOSPITAL | Age: 75
End: 2025-08-06
Payer: MEDICARE

## 2025-08-06 VITALS
TEMPERATURE: 98.2 F | WEIGHT: 178.57 LBS | SYSTOLIC BLOOD PRESSURE: 118 MMHG | HEART RATE: 56 BPM | BODY MASS INDEX: 25 KG/M2 | HEIGHT: 71 IN | OXYGEN SATURATION: 94 % | DIASTOLIC BLOOD PRESSURE: 67 MMHG | RESPIRATION RATE: 18 BRPM

## 2025-08-06 PROBLEM — J18.9 PNEUMONIA, UNSPECIFIED ORGANISM: Status: ACTIVE | Noted: 2025-08-06

## 2025-08-06 LAB
ALBUMIN SERPL-MCNC: 2.7 G/DL (ref 3.5–5.2)
ALBUMIN/GLOB SERPL: 1 G/DL
ALP SERPL-CCNC: 86 U/L (ref 39–117)
ALT SERPL W P-5'-P-CCNC: 273 U/L (ref 1–41)
ANION GAP SERPL CALCULATED.3IONS-SCNC: 10.7 MMOL/L (ref 5–15)
AST SERPL-CCNC: 313 U/L (ref 1–40)
BASOPHILS # BLD AUTO: 0.01 10*3/MM3 (ref 0–0.2)
BASOPHILS NFR BLD AUTO: 0.2 % (ref 0–1.5)
BILIRUB SERPL-MCNC: 0.4 MG/DL (ref 0–1.2)
BUN SERPL-MCNC: 14.1 MG/DL (ref 8–23)
BUN/CREAT SERPL: 13.2 (ref 7–25)
CALCIUM SPEC-SCNC: 8 MG/DL (ref 8.6–10.5)
CHLORIDE SERPL-SCNC: 100 MMOL/L (ref 98–107)
CO2 SERPL-SCNC: 22.3 MMOL/L (ref 22–29)
CREAT SERPL-MCNC: 1.07 MG/DL (ref 0.76–1.27)
DEPRECATED RDW RBC AUTO: 43 FL (ref 37–54)
EGFRCR SERPLBLD CKD-EPI 2021: 71.5 ML/MIN/1.73
EOSINOPHIL # BLD AUTO: 0.13 10*3/MM3 (ref 0–0.4)
EOSINOPHIL NFR BLD AUTO: 2.5 % (ref 0.3–6.2)
ERYTHROCYTE [DISTWIDTH] IN BLOOD BY AUTOMATED COUNT: 13.2 % (ref 12.3–15.4)
GLOBULIN UR ELPH-MCNC: 2.7 GM/DL
GLUCOSE SERPL-MCNC: 110 MG/DL (ref 65–99)
HCT VFR BLD AUTO: 35.5 % (ref 37.5–51)
HGB BLD-MCNC: 12.4 G/DL (ref 13–17.7)
IMM GRANULOCYTES # BLD AUTO: 0.04 10*3/MM3 (ref 0–0.05)
IMM GRANULOCYTES NFR BLD AUTO: 0.8 % (ref 0–0.5)
INR PPP: 1.18 (ref 0.9–1.1)
LYMPHOCYTES # BLD AUTO: 0.74 10*3/MM3 (ref 0.7–3.1)
LYMPHOCYTES NFR BLD AUTO: 14.3 % (ref 19.6–45.3)
MCH RBC QN AUTO: 31.2 PG (ref 26.6–33)
MCHC RBC AUTO-ENTMCNC: 34.9 G/DL (ref 31.5–35.7)
MCV RBC AUTO: 89.2 FL (ref 79–97)
MONOCYTES # BLD AUTO: 0.66 10*3/MM3 (ref 0.1–0.9)
MONOCYTES NFR BLD AUTO: 12.7 % (ref 5–12)
NEUTROPHILS NFR BLD AUTO: 3.61 10*3/MM3 (ref 1.7–7)
NEUTROPHILS NFR BLD AUTO: 69.5 % (ref 42.7–76)
NRBC BLD AUTO-RTO: 0 /100 WBC (ref 0–0.2)
PLATELET # BLD AUTO: 193 10*3/MM3 (ref 140–450)
PMV BLD AUTO: 10.1 FL (ref 6–12)
POTASSIUM SERPL-SCNC: 3.5 MMOL/L (ref 3.5–5.2)
PROCALCITONIN SERPL-MCNC: 0.7 NG/ML (ref 0–0.25)
PROT SERPL-MCNC: 5.4 G/DL (ref 6–8.5)
PROTHROMBIN TIME: 15.3 SECONDS (ref 12.1–15)
RBC # BLD AUTO: 3.98 10*6/MM3 (ref 4.14–5.8)
SODIUM SERPL-SCNC: 133 MMOL/L (ref 136–145)
WBC NRBC COR # BLD AUTO: 5.19 10*3/MM3 (ref 3.4–10.8)

## 2025-08-06 PROCEDURE — 87468 ANAPLSMA PHGCYTOPHLM AMP PRB: CPT | Performed by: FAMILY MEDICINE

## 2025-08-06 PROCEDURE — 85610 PROTHROMBIN TIME: CPT | Performed by: HOSPITALIST

## 2025-08-06 PROCEDURE — 86618 LYME DISEASE ANTIBODY: CPT | Performed by: FAMILY MEDICINE

## 2025-08-06 PROCEDURE — 85025 COMPLETE CBC W/AUTO DIFF WBC: CPT | Performed by: HOSPITALIST

## 2025-08-06 PROCEDURE — 80053 COMPREHEN METABOLIC PANEL: CPT | Performed by: HOSPITALIST

## 2025-08-06 PROCEDURE — 87798 DETECT AGENT NOS DNA AMP: CPT | Performed by: FAMILY MEDICINE

## 2025-08-06 PROCEDURE — 87484 EHRLICHA CHAFFEENSIS AMP PRB: CPT | Performed by: FAMILY MEDICINE

## 2025-08-06 PROCEDURE — 84145 PROCALCITONIN (PCT): CPT | Performed by: HOSPITALIST

## 2025-08-06 PROCEDURE — 99239 HOSP IP/OBS DSCHRG MGMT >30: CPT | Performed by: FAMILY MEDICINE

## 2025-08-06 RX ORDER — ASCORBIC ACID 500 MG
1000 TABLET ORAL DAILY
Qty: 10 TABLET | Refills: 0 | Status: SHIPPED | OUTPATIENT
Start: 2025-08-06 | End: 2025-08-13

## 2025-08-06 RX ORDER — DOXYCYCLINE 100 MG/1
100 CAPSULE ORAL 2 TIMES DAILY
Qty: 20 CAPSULE | Refills: 0 | Status: SHIPPED | OUTPATIENT
Start: 2025-08-06 | End: 2025-08-16

## 2025-08-06 RX ORDER — CEFUROXIME AXETIL 500 MG/1
1000 TABLET ORAL 3 TIMES DAILY
Qty: 24 TABLET | Refills: 0 | Status: SHIPPED | OUTPATIENT
Start: 2025-08-06 | End: 2025-08-06 | Stop reason: HOSPADM

## 2025-08-06 RX ADMIN — Medication 1 CAPSULE: at 08:31

## 2025-08-06 RX ADMIN — PANTOPRAZOLE SODIUM 40 MG: 40 TABLET, DELAYED RELEASE ORAL at 08:31

## 2025-08-06 RX ADMIN — CETIRIZINE HYDROCHLORIDE 10 MG: 10 TABLET, FILM COATED ORAL at 08:32

## 2025-08-06 RX ADMIN — OXYCODONE HYDROCHLORIDE AND ACETAMINOPHEN 1000 MG: 500 TABLET ORAL at 08:32

## 2025-08-06 RX ADMIN — AZITHROMYCIN DIHYDRATE 500 MG: 250 TABLET ORAL at 08:31

## 2025-08-06 RX ADMIN — CEFUROXIME AXETIL 1000 MG: 250 TABLET ORAL at 08:31

## 2025-08-07 ENCOUNTER — TRANSITIONAL CARE MANAGEMENT TELEPHONE ENCOUNTER (OUTPATIENT)
Dept: CALL CENTER | Facility: HOSPITAL | Age: 75
End: 2025-08-07
Payer: MEDICARE

## 2025-08-07 LAB
B BURGDOR IGG+IGM SER QL IA: NEGATIVE
BACTERIA SPEC RESP CULT: ABNORMAL
BACTERIA SPEC RESP CULT: ABNORMAL
GRAM STN SPEC: ABNORMAL

## 2025-08-08 LAB
B DUNCANI IGG TITR SER IF: NORMAL {TITER}
BACTERIA SPEC AEROBE CULT: NORMAL
BACTERIA SPEC AEROBE CULT: NORMAL
Lab: NORMAL
PATHOLOGIST NAME: NORMAL
SUBSTRATE: NORMAL
TITER: <256

## 2025-08-10 LAB
A PHAGOCYTOPH DNA BLD QL NAA+PROBE: NEGATIVE
EHRLICHIA DNA SPEC QL NAA+PROBE: NEGATIVE
RICKETTSIA RICKETTSII DNA, RT: NOT DETECTED

## 2025-08-12 ENCOUNTER — OFFICE VISIT (OUTPATIENT)
Dept: FAMILY MEDICINE CLINIC | Facility: CLINIC | Age: 75
End: 2025-08-12
Payer: MEDICARE

## 2025-08-12 VITALS
HEIGHT: 71 IN | WEIGHT: 172 LBS | HEART RATE: 76 BPM | DIASTOLIC BLOOD PRESSURE: 70 MMHG | BODY MASS INDEX: 24.08 KG/M2 | TEMPERATURE: 97.8 F | OXYGEN SATURATION: 96 % | SYSTOLIC BLOOD PRESSURE: 116 MMHG

## 2025-08-12 DIAGNOSIS — R74.8 ELEVATED LIVER ENZYMES: ICD-10-CM

## 2025-08-12 DIAGNOSIS — J18.9 COMMUNITY ACQUIRED PNEUMONIA OF LEFT LOWER LOBE OF LUNG: ICD-10-CM

## 2025-08-12 DIAGNOSIS — R53.83 FATIGUE, UNSPECIFIED TYPE: Primary | ICD-10-CM

## 2025-08-12 RX ORDER — CEFDINIR 300 MG/1
300 CAPSULE ORAL
COMMUNITY
Start: 2025-08-08

## 2025-08-13 DIAGNOSIS — R53.83 FATIGUE, UNSPECIFIED TYPE: ICD-10-CM

## 2025-08-13 DIAGNOSIS — R74.8 ELEVATED LIVER ENZYMES: Primary | ICD-10-CM

## 2025-08-13 LAB
ALBUMIN SERPL-MCNC: 3.8 G/DL (ref 3.8–4.8)
ALP SERPL-CCNC: 112 IU/L (ref 44–121)
ALT SERPL-CCNC: 183 IU/L (ref 0–44)
AST SERPL-CCNC: 68 IU/L (ref 0–40)
BASOPHILS # BLD AUTO: 0.1 X10E3/UL (ref 0–0.2)
BASOPHILS NFR BLD AUTO: 1 %
BILIRUB SERPL-MCNC: 0.5 MG/DL (ref 0–1.2)
BUN SERPL-MCNC: 20 MG/DL (ref 8–27)
BUN/CREAT SERPL: 19 (ref 10–24)
CALCIUM SERPL-MCNC: 9.4 MG/DL (ref 8.6–10.2)
CHLORIDE SERPL-SCNC: 103 MMOL/L (ref 96–106)
CO2 SERPL-SCNC: 20 MMOL/L (ref 20–29)
CREAT SERPL-MCNC: 1.06 MG/DL (ref 0.76–1.27)
EGFRCR SERPLBLD CKD-EPI 2021: 72 ML/MIN/1.73
EOSINOPHIL # BLD AUTO: 0.1 X10E3/UL (ref 0–0.4)
EOSINOPHIL NFR BLD AUTO: 2 %
ERYTHROCYTE [DISTWIDTH] IN BLOOD BY AUTOMATED COUNT: 13.1 % (ref 11.6–15.4)
GLOBULIN SER CALC-MCNC: 2.9 G/DL (ref 1.5–4.5)
GLUCOSE SERPL-MCNC: 95 MG/DL (ref 70–99)
HCT VFR BLD AUTO: 46 % (ref 37.5–51)
HGB BLD-MCNC: 14.8 G/DL (ref 13–17.7)
IMM GRANULOCYTES # BLD AUTO: 0.2 X10E3/UL (ref 0–0.1)
IMM GRANULOCYTES NFR BLD AUTO: 2 %
LYMPHOCYTES # BLD AUTO: 1.8 X10E3/UL (ref 0.7–3.1)
LYMPHOCYTES NFR BLD AUTO: 20 %
MCH RBC QN AUTO: 30.8 PG (ref 26.6–33)
MCHC RBC AUTO-ENTMCNC: 32.2 G/DL (ref 31.5–35.7)
MCV RBC AUTO: 96 FL (ref 79–97)
MONOCYTES # BLD AUTO: 0.8 X10E3/UL (ref 0.1–0.9)
MONOCYTES NFR BLD AUTO: 8 %
NEUTROPHILS # BLD AUTO: 6.1 X10E3/UL (ref 1.4–7)
NEUTROPHILS NFR BLD AUTO: 67 %
PLATELET # BLD AUTO: 531 X10E3/UL (ref 150–450)
POTASSIUM SERPL-SCNC: 5 MMOL/L (ref 3.5–5.2)
PROT SERPL-MCNC: 6.7 G/DL (ref 6–8.5)
RBC # BLD AUTO: 4.81 X10E6/UL (ref 4.14–5.8)
SODIUM SERPL-SCNC: 139 MMOL/L (ref 134–144)
WBC # BLD AUTO: 9 X10E3/UL (ref 3.4–10.8)

## 2025-08-14 DIAGNOSIS — R74.8 ELEVATED LIVER ENZYMES: Primary | ICD-10-CM

## 2025-08-22 DIAGNOSIS — R53.83 FATIGUE, UNSPECIFIED TYPE: ICD-10-CM

## 2025-08-22 DIAGNOSIS — R74.8 ELEVATED LIVER ENZYMES: ICD-10-CM

## 2025-08-26 LAB
ALBUMIN SERPL-MCNC: 4.1 G/DL (ref 3.5–5.2)
ALBUMIN/GLOB SERPL: 1.6 G/DL
ALP SERPL-CCNC: 78 U/L (ref 39–117)
ALT SERPL-CCNC: 34 U/L (ref 1–41)
AST SERPL-CCNC: 26 U/L (ref 1–40)
BASOPHILS # BLD AUTO: 0.03 10*3/MM3 (ref 0–0.2)
BASOPHILS NFR BLD AUTO: 0.5 % (ref 0–1.5)
BILIRUB SERPL-MCNC: 0.5 MG/DL (ref 0–1.2)
BUN SERPL-MCNC: 16 MG/DL (ref 8–23)
BUN/CREAT SERPL: 14.2 (ref 7–25)
CALCIUM SERPL-MCNC: 9.4 MG/DL (ref 8.6–10.5)
CHLORIDE SERPL-SCNC: 103 MMOL/L (ref 98–107)
CO2 SERPL-SCNC: 24.7 MMOL/L (ref 22–29)
CREAT SERPL-MCNC: 1.13 MG/DL (ref 0.76–1.27)
EGFRCR SERPLBLD CKD-EPI 2021: 66.9 ML/MIN/1.73
EOSINOPHIL # BLD AUTO: 0.27 10*3/MM3 (ref 0–0.4)
EOSINOPHIL NFR BLD AUTO: 4.7 % (ref 0.3–6.2)
ERYTHROCYTE [DISTWIDTH] IN BLOOD BY AUTOMATED COUNT: 12.8 % (ref 12.3–15.4)
GLOBULIN SER CALC-MCNC: 2.6 GM/DL
GLUCOSE SERPL-MCNC: 92 MG/DL (ref 65–99)
HCT VFR BLD AUTO: 43.4 % (ref 37.5–51)
HGB BLD-MCNC: 14.4 G/DL (ref 13–17.7)
IMM GRANULOCYTES # BLD AUTO: 0.05 10*3/MM3 (ref 0–0.05)
IMM GRANULOCYTES NFR BLD AUTO: 0.9 % (ref 0–0.5)
LYMPHOCYTES # BLD AUTO: 1.96 10*3/MM3 (ref 0.7–3.1)
LYMPHOCYTES NFR BLD AUTO: 34 % (ref 19.6–45.3)
MCH RBC QN AUTO: 30.4 PG (ref 26.6–33)
MCHC RBC AUTO-ENTMCNC: 33.2 G/DL (ref 31.5–35.7)
MCV RBC AUTO: 91.6 FL (ref 79–97)
MONOCYTES # BLD AUTO: 0.66 10*3/MM3 (ref 0.1–0.9)
MONOCYTES NFR BLD AUTO: 11.4 % (ref 5–12)
NEUTROPHILS # BLD AUTO: 2.8 10*3/MM3 (ref 1.7–7)
NEUTROPHILS NFR BLD AUTO: 48.5 % (ref 42.7–76)
NRBC BLD AUTO-RTO: 0 /100 WBC (ref 0–0.2)
PLATELET # BLD AUTO: 190 10*3/MM3 (ref 140–450)
POTASSIUM SERPL-SCNC: 4.4 MMOL/L (ref 3.5–5.2)
PROT SERPL-MCNC: 6.7 G/DL (ref 6–8.5)
RBC # BLD AUTO: 4.74 10*6/MM3 (ref 4.14–5.8)
SODIUM SERPL-SCNC: 139 MMOL/L (ref 136–145)
WBC # BLD AUTO: 5.77 10*3/MM3 (ref 3.4–10.8)

## (undated) DEVICE — LAG MINOR PROCEDURE: Brand: MEDLINE INDUSTRIES, INC.

## (undated) DEVICE — DRAPE,REIN 53X77,STERILE: Brand: MEDLINE

## (undated) DEVICE — TP SILK DURAPORE 2 IN

## (undated) DEVICE — 3M™ IOBAN™ 2 ANTIMICROBIAL INCISE DRAPE 6650EZ: Brand: IOBAN™ 2

## (undated) DEVICE — SPNG GZ STRL 2S 4X4 12PLY

## (undated) DEVICE — 3.5MM X 12MM NON-LOCKING HEXALOBE SCREW
Type: IMPLANTABLE DEVICE | Site: CLAVICLE | Status: NON-FUNCTIONAL
Brand: ACUMED
Removed: 2018-08-28

## (undated) DEVICE — INTENT TO BE USED WITH SUTURE MATERIAL FOR TISSUE CLOSURE: Brand: RICHARD-ALLAN® NEEDLE 1/2 CIRCLE TAPER

## (undated) DEVICE — BIPOLAR ELECTROHEMOSTASIS CATHETER: Brand: INJECTION GOLD PROBE

## (undated) DEVICE — MSK PROC CURAPLEX O2 2/ADAPT 7FT

## (undated) DEVICE — SUT VIC 2/0 CT1 CR8 18IN J839D

## (undated) DEVICE — ADAPT CLN BIOGUARD AIR/H2O DISP

## (undated) DEVICE — BITEBLOCK OMNI BLOC

## (undated) DEVICE — SKIN PREP TRAY W/CHG: Brand: MEDLINE INDUSTRIES, INC.

## (undated) DEVICE — SUT VIC 3/0 CTI 36IN J944H

## (undated) DEVICE — CANN O2 ETCO2 FITS ALL CONN CO2 SMPL A/ 7IN DISP LF

## (undated) DEVICE — 2.8MM QUICK RELEASE DRILL: Brand: ACUMED

## (undated) DEVICE — PK ORTHO MINOR TOWER 40

## (undated) DEVICE — SUT ETHIB 0/0 MO6 I8IN CX45D

## (undated) DEVICE — SUCTION CANISTER, 1000CC,SAFELINER: Brand: DEROYAL

## (undated) DEVICE — ARM SLING: Brand: DEROYAL

## (undated) DEVICE — 3M™ STERI-DRAPE™ U-DRAPE 1015: Brand: STERI-DRAPE™

## (undated) DEVICE — LN SMPL CO2 SHTRM SD STREAM W/M LUER

## (undated) DEVICE — GLV SURG BIOGEL LTX PF 8

## (undated) DEVICE — 3M™ STERI-STRIP™ REINFORCED ADHESIVE SKIN CLOSURES, R1547, 1/2 IN X 4 IN (12 MM X 100 MM), 6 STRIPS/ENVELOPE: Brand: 3M™ STERI-STRIP™

## (undated) DEVICE — DRAPE,U/ SHT,SPLIT,PLAS,STERIL: Brand: MEDLINE

## (undated) DEVICE — TUBING, SUCTION, 1/4" X 10', STRAIGHT: Brand: MEDLINE

## (undated) DEVICE — KT ORCA ORCAPOD DISP STRL

## (undated) DEVICE — SUT MNCRYL 3/0 PS2 18IN Y497G

## (undated) DEVICE — SENSR O2 OXIMAX FNGR A/ 18IN NONSTR

## (undated) DEVICE — DRP C/ARM 41X74IN

## (undated) DEVICE — UNDYED BRAIDED (POLYGLACTIN 910), SYNTHETIC ABSORBABLE SUTURE: Brand: COATED VICRYL

## (undated) DEVICE — ENCORE® LATEX ORTHO SIZE 7, STERILE LATEX POWDER-FREE SURGICAL GLOVE: Brand: ENCORE

## (undated) DEVICE — STCKNT IMPERV 9X36IN STRL

## (undated) DEVICE — GLV SURG BIOGEL LTX PF 7

## (undated) DEVICE — DRSNG GZ PETROLTM XEROFORM CURAD 1X8IN STRL

## (undated) DEVICE — SUT MNCRYL 4/0 PS2 18 IN

## (undated) DEVICE — BNDG ELAS CO-FLEX SLF ADHR 6IN 5YD LF STRL

## (undated) DEVICE — GLV SURG TRIUMPH CLASSIC PF LTX 7.5 STRL